# Patient Record
Sex: MALE | Race: WHITE | HISPANIC OR LATINO | Employment: OTHER | URBAN - METROPOLITAN AREA
[De-identification: names, ages, dates, MRNs, and addresses within clinical notes are randomized per-mention and may not be internally consistent; named-entity substitution may affect disease eponyms.]

---

## 2017-04-20 ENCOUNTER — OFFICE VISIT (OUTPATIENT)
Dept: SURGERY | Facility: CLINIC | Age: 72
End: 2017-04-20
Payer: MEDICARE

## 2017-04-20 VITALS — BODY MASS INDEX: 23.58 KG/M2 | HEIGHT: 71 IN | WEIGHT: 168.44 LBS

## 2017-04-20 DIAGNOSIS — Z01.818 PRE-OP EVALUATION: Primary | ICD-10-CM

## 2017-04-20 DIAGNOSIS — K60.3 FISTULA-IN-ANO: Primary | ICD-10-CM

## 2017-04-20 PROCEDURE — 1126F AMNT PAIN NOTED NONE PRSNT: CPT | Mod: S$GLB,,, | Performed by: COLON & RECTAL SURGERY

## 2017-04-20 PROCEDURE — 1160F RVW MEDS BY RX/DR IN RCRD: CPT | Mod: S$GLB,,, | Performed by: COLON & RECTAL SURGERY

## 2017-04-20 PROCEDURE — 99203 OFFICE O/P NEW LOW 30 MIN: CPT | Mod: 57,S$GLB,, | Performed by: COLON & RECTAL SURGERY

## 2017-04-20 PROCEDURE — 99999 PR PBB SHADOW E&M-EST. PATIENT-LVL IV: CPT | Mod: PBBFAC,,, | Performed by: COLON & RECTAL SURGERY

## 2017-04-20 PROCEDURE — 1159F MED LIST DOCD IN RCRD: CPT | Mod: S$GLB,,, | Performed by: COLON & RECTAL SURGERY

## 2017-04-20 RX ORDER — LOSARTAN POTASSIUM 25 MG/1
25 TABLET ORAL DAILY
COMMUNITY
End: 2019-04-15 | Stop reason: SDUPTHER

## 2017-04-20 RX ORDER — CARVEDILOL 25 MG/1
25 TABLET ORAL 2 TIMES DAILY WITH MEALS
COMMUNITY
End: 2017-11-03

## 2017-04-20 RX ORDER — ACETAMINOPHEN 10 MG/ML
1000 INJECTION, SOLUTION INTRAVENOUS
Status: CANCELLED | OUTPATIENT
Start: 2017-04-20 | End: 2017-04-20

## 2017-04-20 NOTE — PROGRESS NOTES
History & Physical    SUBJECTIVE:     Chief Complaint: fistula     History of Present Illness:  Patient is a 71 y.o. male who presents with a one month history of drainage near his anus.  He reported intiially having sever pain x 5 days and spontaneous drainage there after from his anus.  He reports the drainage occurs daily and is worse with bowel movements.  He states his bowel do not cause pain.  He had a CSP 10 years prior and is due for one this August.  He denies any fevers or chills.  He reports a similar episode 20 years prior that spontaneously resolved.      Review of patient's allergies indicates:  No Known Allergies    Past Medical History:   Diagnosis Date    Allergy     Hypertension     Prostate cancer     Urinary leakage      Past Surgical History:   Procedure Laterality Date    HERNIA REPAIR      x3    PROSTATECTOMY      radiation treatment       Family History   Problem Relation Age of Onset    Cancer Father     Cancer Mother      Social History   Substance Use Topics    Smoking status: Never Smoker    Smokeless tobacco: None    Alcohol use No        Review of Systems:  Constitutional: no fever or chills  Eyes: no visual changes  ENT: no nasal congestion or sore throat  Respiratory: no cough or shortness of breath  Cardiovascular: no chest pain or palpitations  Gastrointestinal: no nausea or vomiting, tolerating diet    OBJECTIVE:     Vital Signs (Most Recent)       Physical Exam:  General: White male in NAD sitting in chair in clinic  Neuro: aaox4 maex4 perrl  Respiratory: resps even unlabored  Cardiac: cap refill <2 sec  Abdomen: Normal, benign.  Extremities: Warm dry and intact  Anorectal: deferred, anal fistula appreciated with purulent drainage noted, tender to palpation, WILLIAM not performed     Laboratory  CBC:   BMP:     Diagnostic Results:  none    ASSESSMENT/PLAN:     Fistula in Ano    Plan for EUA [id number] with possible fistulotomy vs seton placement  Seen by Preop center at [facility],  see media tab for scanned note  Consent and ORders placed    Admission paperwork was accomplished including operative consent and consent for blood and blood product administration.    The procedure was explained to the patient including indications, risks and alternatives. The patient verbalized understanding and has given informed consent.      Petr Christian MD  Colon and Rectal Surgery Fellow  [phone]  Have seen and examined the patient with the fellow and agree with their plan.  GAEL FITCH

## 2017-04-21 ENCOUNTER — ANESTHESIA EVENT (OUTPATIENT)
Dept: SURGERY | Facility: HOSPITAL | Age: 72
End: 2017-04-21
Payer: MEDICARE

## 2017-04-21 NOTE — ANESTHESIA PREPROCEDURE EVALUATION
Anesthesia Assessment: Preoperative EQUATION    Planned Procedure: Procedure(s) (LRB):  EXAM UNDER ANESTHESIA (N/A)  Requested Anesthesia Type:General  Surgeon: Juaquin Vu MD  Service: Colon and Rectal  Known or anticipated Date of Surgery:5/3/2017    Surgeon notes: reviewed and Fistula-in-ano    Electronic QUestionnaire Assessment completed via nurse interview with patient.        BenjieDomenicoro Litodenton [[id number]] - 71 y.o. Male        Providers Outside of [facility]           Pre-admit from 5/3/2017 in [facility]     Has outside PCP  Yes [[facility]       Surgical Risk Level      Surgical Risk Level:  1           caRDScore (Clinical Anesthesia Rapid Decision Score)        Moderate  Total Score: 17      17 Sum of Clinical Scores       caRDScores (Grouped)      caRDScore - Ane:  4                caRDScore - CVD:  1                caRDScore - Pul:  0                caRDScore - Met:  4                caRDScore - Phy:  8           caRDScore Items           Pre-admit from 5/3/2017 in [facility]     Anesthesia      Has disease or surgery of the esophagus or stomach (ulcer, gastic bypass, cancer)  Yes [ulcer hx]     Has/has had bowel disease of small or large intestine  Yes [Fistula-in-ano]     CVD      Activity similar to best ability for maximal activity or exercise  METS 4     Diagnosed with high blood pressure  Yes     Typical BP runs <150/90  Yes     Pulmonary      Metabolic      Type 2 Diabetes  Yes     Is on oral Rx and/or non-insulin injectable for diabetes  Yes     Has hyperlipoproteinemia  Yes     Physiologic      Has an open wound (with or without an infection)  Yes [fistula-in-ano]     Without trying, clothes have become noticably looser  Yes     Without trying, has lost > 10% of body wt in last 6 mos  Yes     Had major surgery for Ca with resection of vital organ tissue (Brain, Lung, Liver, Kidney-please specify)  Yes  [s/p Prostatectomy & radiation-x5 yrs. ago]     Has disease or surgery of the esophagus or stomach (ulcer, gastic bypass, cancer)  Yes [ulcer hx]       Flags      Red Flag Score:  0                Yellow Flag Score:  3           Red Flags      No data to display       Yellow Flags           Pre-admit from 5/3/2017 in [facility]     Has had surgery within the last 3 months  Yes     Takes herbal medications or vitamin supplements  Yes [will stop x7 ddays prior to surgery]     Has disease or surgery of the esophagus or stomach (ulcer, gastic bypass, cancer)  Yes [ulcer hx]       PONV Risk Score (assumes periop narcotic use = +1, Max=4)      PONV Risk Score:  2           PONV Risk Factors  Total Score: 1      1 Non-Smoker at present       Sleep Apnea  Total Score: 0        ANGELES STOP-Bang Risk Factors (Max=8)  Total Score: 4      1 Has loud snoring     1 Takes medication for high blood pressure     1 Age >50     1 Male       ANGELES Risk Level - 1 (Low), 2 (Moderate), 3 (High)      ANGELES Risk Level:  2           RCRI (Revised Cardiac Risk Indices of ACC/AHA guidelines, Max=6)  Total Score: 0        CAD Risk Factors  Total Score: 5      1 Male. Age >45     1 Exercise on a routine basis     1 Diagnosed with high blood pressure     1 Has Diabetes     1 Has hyperlipoproteinemia       CHADS Score if applicable (history of atrial fib/flutter, Max=6)  Total Score: 2      1 Diagnosed with high blood pressure     1 Has Diabetes       Maximal Exercise Capacity           Pre-admit from 5/3/2017 in [facility]     Maximal Exercise Capacity  METS 4       Summary of Dependence  Total Score: 1      1 Is totally independent of others for activities of daily living       Phone Fraility Score (Max = 17)  Total Score: 2      1 Without trying, clothes have become noticably looser     1 Describes health as Fair       Pain Factors      No data to display       Risk Triggers (Evidence-Based Risk Triggers)         Pulmonary Risk Triggers  Total Score: 3      1 Age > or = 60     1 Without trying, has lost > 10% of body wt in last 6 mos     1 Has disease or surgery of the esophagus or stomach (ulcer, gastic bypass, cancer)       Renal Risk Triggers  Total Score: 2      1 Diagnosed with high blood pressure     1 Type 2 Diabetes       Delirium Risk Triggers  Total Score: 0        Urologic Risk Triggers  Total Score: 0        Logistics        Pre-op Clinic Logistics  Total Score: 6      1 Has had surgery within the last 3 months     1 Has outside PCP     2 Takes herbal medications or vitamin supplements     1 Has had anesthesia, either as adult or as a child     1 Takes medication for high blood pressure       DOSC Logistics  Total Score: 0        Discharge Logistics  Total Score: 0        Discharge Planning           Pre-admit from 5/3/2017 in [facility]     Discharge Planning      Will assist patient 24/7, if needed  -- [[phone]]       Fast Track <For office use only>      Total Score: 16        Surgical Risk Level Assessment                 Triage considerations:     The patient has no apparent active cardiac condition (No unstable coronary Syndrome such as severe unstable angina or recent [<1 month] myocardial infarction, decompensated CHF, severe valvular   disease or significant arrhythmia)    Previous anesthesia records:Not available and previous surgeries: S/P-Tonsillectomy/S/p-Prostate Biopsy/ S/P-Arthroscopy of knee/S/p-Prostatectomy/S/p-Henrnia Repair//S/p-Radiation/s/p Partial knee Replaced-1/2017 @ [facility]--No Anesthesia records found in [facility]-Patient stated he has done well with anesthesia for each of his surgeries.    Last PCP note: within 3 months , outside [facility] , focused visit   Subspecialty notes: Urology    Other important co-morbidities: HLD(hyperlipidemia)/HTN(Hypertension)/DM 2(Diabetes Mellitus)/USI(Urinary Stress Incontinence)/Prostate cancer  history/ED/Gastric ulceration history/Inguinal hernia,unilateral hx-s/p hernia repair/arthritis-knees     Tests already available:  No recent tests.            Instructions given. (See in Nurse's note)    Optimization: Phone         Plan:    Testing:  None needed at this time       Patient  has previously scheduled Medical Appointment:None prior to surgery date.    Navigation:                             Straight Line to surgery.               No tests, anesthesia preop clinic visit, or consult required.               Caroline Beltrán RN  4/21/17 04/21/2017  Aguilar Waldrop is a 71 y.o., male.    Anesthesia Evaluation    I have reviewed the Patient Summary Reports.    I have reviewed the Nursing Notes.   I have reviewed the Medications.     Review of Systems  Anesthesia Hx:  No problems with previous Anesthesia Denies Hx of Anesthetic complications  History of prior surgery of interest to airway management or planning: Denies Family Hx of Anesthesia complications.   Denies Personal Hx of Anesthesia complications.   Cardiovascular:   Hypertension    Pulmonary:  Pulmonary Normal    Renal/:  Renal/ Normal     Hepatic/GI:   Fistula in ano   Neurological:  Neurology Normal        Physical Exam  General:  Well nourished    Airway/Jaw/Neck:  Airway Findings: Mouth Opening: Normal Tongue: Normal  Jaw/Neck Findings:  Micrognathia: Negative Neck ROM: Normal ROM      Dental:  Dental Findings: In tact   Chest/Lungs:  Chest/Lungs Findings: Clear to auscultation, Normal Respiratory Rate     Heart/Vascular:  Heart Findings: Rate: Normal  Rhythm: Regular Rhythm  Sounds: Normal  Heart murmur: negative    Abdomen:  Abdomen Findings:  Normal, Nontender, Soft       Mental Status:  Mental Status Findings:  Cooperative, Alert and Oriented         Anesthesia Plan  Type of Anesthesia, risks & benefits  discussed:  Anesthesia Type:  general  Patient's Preference:   Intra-op Monitoring Plan:   Intra-op Monitoring Plan Comments:   Post Op Pain Control Plan:   Post Op Pain Control Plan Comments:   Induction:   IV  Beta Blocker:  Patient is on a Beta-Blocker and has received one dose within the past 24 hours (No further documentation required).       Informed Consent: Patient representative understands risks and agrees with Anesthesia plan.  Questions answered. Anesthesia consent signed with patient representative.  ASA Score: 2     Day of Surgery Review of History & Physical:    H&P update referred to the surgeon.         Ready For Surgery From Anesthesia Perspective.

## 2017-04-21 NOTE — PRE ADMISSION SCREENING
Anesthesia Assessment: Preoperative EQUATION    Planned Procedure: Procedure(s) (LRB):  EXAM UNDER ANESTHESIA (N/A)  Requested Anesthesia Type:General  Surgeon: Juaquin Vu MD  Service: Colon and Rectal  Known or anticipated Date of Surgery:5/3/2017    Surgeon notes: reviewed and Fistula-in-ano    Electronic QUestionnaire Assessment completed via nurse interview with patient.        BenjieDomenicoro Litodenton [[id number]] - 71 y.o. Male        Providers Outside of [facility]           Pre-admit from 5/3/2017 in [facility]     Has outside PCP  Yes [[facility]       Surgical Risk Level      Surgical Risk Level:  1           caRDScore (Clinical Anesthesia Rapid Decision Score)        Moderate  Total Score: 17      17 Sum of Clinical Scores       caRDScores (Grouped)      caRDScore - Ane:  4                caRDScore - CVD:  1                caRDScore - Pul:  0                caRDScore - Met:  4                caRDScore - Phy:  8           caRDScore Items           Pre-admit from 5/3/2017 in [facility]     Anesthesia      Has disease or surgery of the esophagus or stomach (ulcer, gastic bypass, cancer)  Yes [ulcer hx]     Has/has had bowel disease of small or large intestine  Yes [Fistula-in-ano]     CVD      Activity similar to best ability for maximal activity or exercise  METS 4     Diagnosed with high blood pressure  Yes     Typical BP runs <150/90  Yes     Pulmonary      Metabolic      Type 2 Diabetes  Yes     Is on oral Rx and/or non-insulin injectable for diabetes  Yes     Has hyperlipoproteinemia  Yes     Physiologic      Has an open wound (with or without an infection)  Yes [fistula-in-ano]     Without trying, clothes have become noticably looser  Yes     Without trying, has lost > 10% of body wt in last 6 mos  Yes     Had major surgery for Ca with resection of vital organ tissue (Brain, Lung, Liver, Kidney-please specify)  Yes  [s/p Prostatectomy & radiation-x5 yrs. ago]     Has disease or surgery of the esophagus or stomach (ulcer, gastic bypass, cancer)  Yes [ulcer hx]       Flags      Red Flag Score:  0                Yellow Flag Score:  3           Red Flags      No data to display       Yellow Flags           Pre-admit from 5/3/2017 in [facility]     Has had surgery within the last 3 months  Yes     Takes herbal medications or vitamin supplements  Yes [will stop x7 ddays prior to surgery]     Has disease or surgery of the esophagus or stomach (ulcer, gastic bypass, cancer)  Yes [ulcer hx]       PONV Risk Score (assumes periop narcotic use = +1, Max=4)      PONV Risk Score:  2           PONV Risk Factors  Total Score: 1      1 Non-Smoker at present       Sleep Apnea  Total Score: 0        ANGELES STOP-Bang Risk Factors (Max=8)  Total Score: 4      1 Has loud snoring     1 Takes medication for high blood pressure     1 Age >50     1 Male       ANGELES Risk Level - 1 (Low), 2 (Moderate), 3 (High)      ANGELES Risk Level:  2           RCRI (Revised Cardiac Risk Indices of ACC/AHA guidelines, Max=6)  Total Score: 0        CAD Risk Factors  Total Score: 5      1 Male. Age >45     1 Exercise on a routine basis     1 Diagnosed with high blood pressure     1 Has Diabetes     1 Has hyperlipoproteinemia       CHADS Score if applicable (history of atrial fib/flutter, Max=6)  Total Score: 2      1 Diagnosed with high blood pressure     1 Has Diabetes       Maximal Exercise Capacity           Pre-admit from 5/3/2017 in [facility]     Maximal Exercise Capacity  METS 4       Summary of Dependence  Total Score: 1      1 Is totally independent of others for activities of daily living       Phone Fraility Score (Max = 17)  Total Score: 2      1 Without trying, clothes have become noticably looser     1 Describes health as Fair       Pain Factors      No data to display       Risk Triggers (Evidence-Based Risk Triggers)         Pulmonary Risk Triggers  Total Score: 3      1 Age > or = 60     1 Without trying, has lost > 10% of body wt in last 6 mos     1 Has disease or surgery of the esophagus or stomach (ulcer, gastic bypass, cancer)       Renal Risk Triggers  Total Score: 2      1 Diagnosed with high blood pressure     1 Type 2 Diabetes       Delirium Risk Triggers  Total Score: 0        Urologic Risk Triggers  Total Score: 0        Logistics        Pre-op Clinic Logistics  Total Score: 6      1 Has had surgery within the last 3 months     1 Has outside PCP     2 Takes herbal medications or vitamin supplements     1 Has had anesthesia, either as adult or as a child     1 Takes medication for high blood pressure       DOSC Logistics  Total Score: 0        Discharge Logistics  Total Score: 0        Discharge Planning           Pre-admit from 5/3/2017 in [facility]     Discharge Planning      Will assist patient 24/7, if needed  -- [[phone]]       Fast Track <For office use only>      Total Score: 16        Surgical Risk Level Assessment                 Triage considerations:     The patient has no apparent active cardiac condition (No unstable coronary Syndrome such as severe unstable angina or recent [<1 month] myocardial infarction, decompensated CHF, severe valvular   disease or significant arrhythmia)    Previous anesthesia records:Not available and previous surgeries: S/P-Tonsillectomy/S/p-Prostate Biopsy/ S/P-Arthroscopy of knee/S/p-Prostatectomy/S/p-Henrnia Repair//S/p-Radiation/s/p Partial knee Replaced-1/2017 [facility]--No Anesthesia records found in [employer] [facility]-Patient stated he has done well with anesthesia for each of his surgeries.    Last PCP note: within 3 months , outside [facility] , focused visit   Subspecialty notes: Urology    Other important co-morbidities: HLD(hyperlipidemia)/HTN(Hypertension)/DM 2(Diabetes Mellitus)/USI(Urinary Stress Incontinence)/Prostate cancer  history/ED/Gastric ulceration history/Inguinal hernia,unilateral hx-s/p hernia repair/arthritis-knees     Tests already available:  No recent tests.            Instructions given. (See in Nurse's note)    Optimization: Phone         Plan:    Testing:  None needed at this time       Patient  has previously scheduled Medical Appointment:None prior to surgery date.    Navigation:                             Straight Line to surgery.               No tests, anesthesia preop clinic visit, or consult required.               Caroline Beltrán RN  4/21/17

## 2017-05-03 ENCOUNTER — HOSPITAL ENCOUNTER (OUTPATIENT)
Facility: HOSPITAL | Age: 72
Discharge: HOME | End: 2017-05-03
Attending: COLON & RECTAL SURGERY | Admitting: COLON & RECTAL SURGERY
Payer: MEDICARE

## 2017-05-03 ENCOUNTER — ANESTHESIA (OUTPATIENT)
Dept: SURGERY | Facility: HOSPITAL | Age: 72
End: 2017-05-03
Payer: MEDICARE

## 2017-05-03 DIAGNOSIS — K60.3 FISTULA-IN-ANO: Primary | ICD-10-CM

## 2017-05-03 LAB — POCT GLUCOSE: 133 MG/DL (ref 70–110)

## 2017-05-03 PROCEDURE — 71000033 HC RECOVERY, INTIAL HOUR: Performed by: COLON & RECTAL SURGERY

## 2017-05-03 PROCEDURE — 71000039 HC RECOVERY, EACH ADD'L HOUR: Performed by: COLON & RECTAL SURGERY

## 2017-05-03 PROCEDURE — D9220A PRA ANESTHESIA: Mod: CRNA,,, | Performed by: NURSE ANESTHETIST, CERTIFIED REGISTERED

## 2017-05-03 PROCEDURE — 25000003 PHARM REV CODE 250: Performed by: COLON & RECTAL SURGERY

## 2017-05-03 PROCEDURE — 25000003 PHARM REV CODE 250: Performed by: ANESTHESIOLOGY

## 2017-05-03 PROCEDURE — 25000003 PHARM REV CODE 250: Performed by: SURGERY

## 2017-05-03 PROCEDURE — 63600175 PHARM REV CODE 636 W HCPCS: Performed by: NURSE ANESTHETIST, CERTIFIED REGISTERED

## 2017-05-03 PROCEDURE — 71000016 HC POSTOP RECOV ADDL HR: Performed by: COLON & RECTAL SURGERY

## 2017-05-03 PROCEDURE — 63600175 PHARM REV CODE 636 W HCPCS: Performed by: SURGERY

## 2017-05-03 PROCEDURE — 71000015 HC POSTOP RECOV 1ST HR: Performed by: COLON & RECTAL SURGERY

## 2017-05-03 PROCEDURE — 36000704 HC OR TIME LEV I 1ST 15 MIN: Performed by: COLON & RECTAL SURGERY

## 2017-05-03 PROCEDURE — 37000008 HC ANESTHESIA 1ST 15 MINUTES: Performed by: COLON & RECTAL SURGERY

## 2017-05-03 PROCEDURE — 82962 GLUCOSE BLOOD TEST: CPT | Performed by: COLON & RECTAL SURGERY

## 2017-05-03 PROCEDURE — 36000705 HC OR TIME LEV I EA ADD 15 MIN: Performed by: COLON & RECTAL SURGERY

## 2017-05-03 PROCEDURE — 63600175 PHARM REV CODE 636 W HCPCS: Performed by: ANESTHESIOLOGY

## 2017-05-03 PROCEDURE — D9220A PRA ANESTHESIA: Mod: ANES,,, | Performed by: ANESTHESIOLOGY

## 2017-05-03 PROCEDURE — 37000009 HC ANESTHESIA EA ADD 15 MINS: Performed by: COLON & RECTAL SURGERY

## 2017-05-03 PROCEDURE — 46020 PLACEMENT OF SETON: CPT | Mod: ,,, | Performed by: COLON & RECTAL SURGERY

## 2017-05-03 PROCEDURE — 25000003 PHARM REV CODE 250: Performed by: NURSE ANESTHETIST, CERTIFIED REGISTERED

## 2017-05-03 RX ORDER — ONDANSETRON 2 MG/ML
INJECTION INTRAMUSCULAR; INTRAVENOUS
Status: DISCONTINUED | OUTPATIENT
Start: 2017-05-03 | End: 2017-05-03

## 2017-05-03 RX ORDER — PROPOFOL 10 MG/ML
VIAL (ML) INTRAVENOUS
Status: DISCONTINUED | OUTPATIENT
Start: 2017-05-03 | End: 2017-05-03

## 2017-05-03 RX ORDER — IBUPROFEN 200 MG
600 TABLET ORAL EVERY 6 HOURS PRN
Status: DISCONTINUED | OUTPATIENT
Start: 2017-05-03 | End: 2017-05-03 | Stop reason: HOSPADM

## 2017-05-03 RX ORDER — ROCURONIUM BROMIDE 10 MG/ML
INJECTION, SOLUTION INTRAVENOUS
Status: DISCONTINUED | OUTPATIENT
Start: 2017-05-03 | End: 2017-05-03

## 2017-05-03 RX ORDER — GLYCOPYRROLATE 0.2 MG/ML
INJECTION INTRAMUSCULAR; INTRAVENOUS
Status: DISCONTINUED | OUTPATIENT
Start: 2017-05-03 | End: 2017-05-03

## 2017-05-03 RX ORDER — NEOSTIGMINE METHYLSULFATE 1 MG/ML
INJECTION, SOLUTION INTRAVENOUS
Status: DISCONTINUED | OUTPATIENT
Start: 2017-05-03 | End: 2017-05-03

## 2017-05-03 RX ORDER — LIDOCAINE HYDROCHLORIDE 10 MG/ML
1 INJECTION, SOLUTION EPIDURAL; INFILTRATION; INTRACAUDAL; PERINEURAL ONCE
Status: COMPLETED | OUTPATIENT
Start: 2017-05-03 | End: 2017-05-03

## 2017-05-03 RX ORDER — ACETAMINOPHEN 10 MG/ML
1000 INJECTION, SOLUTION INTRAVENOUS
Status: COMPLETED | OUTPATIENT
Start: 2017-05-03 | End: 2017-05-03

## 2017-05-03 RX ORDER — MIDAZOLAM HYDROCHLORIDE 1 MG/ML
INJECTION, SOLUTION INTRAMUSCULAR; INTRAVENOUS
Status: DISCONTINUED | OUTPATIENT
Start: 2017-05-03 | End: 2017-05-03

## 2017-05-03 RX ORDER — IBUPROFEN 800 MG/1
800 TABLET ORAL 3 TIMES DAILY
Qty: 90 TABLET | Refills: 3 | Status: ON HOLD | OUTPATIENT
Start: 2017-05-03 | End: 2017-07-08 | Stop reason: HOSPADM

## 2017-05-03 RX ORDER — SODIUM CHLORIDE 9 MG/ML
INJECTION, SOLUTION INTRAVENOUS CONTINUOUS
Status: DISCONTINUED | OUTPATIENT
Start: 2017-05-03 | End: 2017-05-03 | Stop reason: HOSPADM

## 2017-05-03 RX ORDER — OXYCODONE AND ACETAMINOPHEN 5; 325 MG/1; MG/1
1 TABLET ORAL EVERY 4 HOURS PRN
Qty: 90 TABLET | Refills: 0 | Status: ON HOLD | OUTPATIENT
Start: 2017-05-03 | End: 2017-07-08 | Stop reason: HOSPADM

## 2017-05-03 RX ORDER — HYDROCODONE BITARTRATE AND ACETAMINOPHEN 5; 325 MG/1; MG/1
1 TABLET ORAL EVERY 4 HOURS PRN
Status: DISCONTINUED | OUTPATIENT
Start: 2017-05-03 | End: 2017-05-03 | Stop reason: HOSPADM

## 2017-05-03 RX ORDER — LIDOCAINE HCL/PF 100 MG/5ML
SYRINGE (ML) INTRAVENOUS
Status: DISCONTINUED | OUTPATIENT
Start: 2017-05-03 | End: 2017-05-03

## 2017-05-03 RX ORDER — FENTANYL CITRATE 50 UG/ML
INJECTION, SOLUTION INTRAMUSCULAR; INTRAVENOUS
Status: DISCONTINUED | OUTPATIENT
Start: 2017-05-03 | End: 2017-05-03

## 2017-05-03 RX ADMIN — Medication 800 MG: at 10:05

## 2017-05-03 RX ADMIN — LIDOCAINE HYDROCHLORIDE 10 MG: 10 INJECTION, SOLUTION EPIDURAL; INFILTRATION; INTRACAUDAL; PERINEURAL at 09:05

## 2017-05-03 RX ADMIN — SODIUM CHLORIDE: 0.9 INJECTION, SOLUTION INTRAVENOUS at 09:05

## 2017-05-03 RX ADMIN — ACETAMINOPHEN 1000 MG: 10 INJECTION, SOLUTION INTRAVENOUS at 09:05

## 2017-05-03 RX ADMIN — LIDOCAINE HYDROCHLORIDE 60 MG: 20 INJECTION, SOLUTION INTRAVENOUS at 10:05

## 2017-05-03 RX ADMIN — FENTANYL CITRATE 100 MCG: 50 INJECTION, SOLUTION INTRAMUSCULAR; INTRAVENOUS at 10:05

## 2017-05-03 RX ADMIN — NEOSTIGMINE METHYLSULFATE 5 MG: 1 INJECTION INTRAVENOUS at 11:05

## 2017-05-03 RX ADMIN — GLYCOPYRROLATE 0.6 MG: 0.2 INJECTION, SOLUTION INTRAMUSCULAR; INTRAVENOUS at 11:05

## 2017-05-03 RX ADMIN — FENTANYL CITRATE 50 MCG: 50 INJECTION, SOLUTION INTRAMUSCULAR; INTRAVENOUS at 11:05

## 2017-05-03 RX ADMIN — ROCURONIUM BROMIDE 30 MG: 10 INJECTION, SOLUTION INTRAVENOUS at 10:05

## 2017-05-03 RX ADMIN — PROPOFOL 150 MG: 10 INJECTION, EMULSION INTRAVENOUS at 10:05

## 2017-05-03 RX ADMIN — MIDAZOLAM HYDROCHLORIDE 2 MG: 1 INJECTION, SOLUTION INTRAMUSCULAR; INTRAVENOUS at 10:05

## 2017-05-03 RX ADMIN — ONDANSETRON 4 MG: 2 INJECTION INTRAMUSCULAR; INTRAVENOUS at 11:05

## 2017-05-03 NOTE — DISCHARGE INSTRUCTIONS
Home Care Instructions  ANAL/RECTAL SURGERY     ACTIVITY LEVEL:    If you received sedation and/or an anesthetic, you may feel sleepy for several hours. Rest until you feel more awake. Gradually resume your normal activities.    DIET:    At home, continue with liquids. If there is no nausea, you may eat a soft diet and gradually resume a high fiber diet. Encourage fluids.    SPECIAL INSTRUCTIONS:    Eat plenty of fruits and vegetables. Drink 8-10 glasses of water or juice every day. Eat whole grain cereals and breads. Salads with raw vegetables are also a good source of fiber.    DRESSING:    Remove your bandage _________________________________ while soaking. Start taking a sitz bath _________________. You should soak in clear warm water ___________ times a day and after each bowel movement. You may use 4 x 4 gauze to the surgical area for any drainage. Wash the area with mild soap during your regular bath. Use soft, damp tissue or disposable wipes not containing alcohol when wiping after bowel movements, pat the area and gently dry. Avoid excessive or vigorous wiping. It may help to place soft tissue or a cotton pad between the buttocks to keep the cheeks  and to absorb any moisture.    MEDICIATIONS:    You will receive instructions for your pain and/or antibiotic prescriptions. Pain medication should be taken only if needed, and as directed. Antibiotics should be taken as directed until the entire prescription is completed. If ordered, take stool softeners as directed.    WHEN TO CALL THE DOCTOR:     For any obvious active bleeding   Redness or swelling around the incision   Fever over 101°F (38.4°C)   Drainage (pus) from the wound   Persistent pain not relieved by the pain medication    RETURN APPOINTMENT:  _________________________________________________________________________________________    FOR EMERGENCIES:    If any unusual problems or difficulties occur, contact  ___________________ or  the resident at [phone] (page ) or at the Clinic office, [phone].

## 2017-05-03 NOTE — INTERVAL H&P NOTE
No change in H+P I have again explained the procedure including indications, alternatives, expected outcomes and potential complications. All questions were answered            Active Hospital Problems    Diagnosis  POA    Fistula-in-ano [K60.3]  Yes      Resolved Hospital Problems    Diagnosis Date Resolved POA   No resolved problems to display.

## 2017-05-03 NOTE — PROGRESS NOTES
Patient is awake. HR ranges from 44-48 and asymptomatic. Dr Kam aware and okay to transfer to [facility].

## 2017-05-03 NOTE — H&P (VIEW-ONLY)
History & Physical    SUBJECTIVE:     Chief Complaint: fistula     History of Present Illness:  Patient is a 71 y.o. male who presents with a one month history of drainage near his anus.  He reported intiially having sever pain x 5 days and spontaneous drainage there after from his anus.  He reports the drainage occurs daily and is worse with bowel movements.  He states his bowel do not cause pain.  He had a CSP 10 years prior and is due for one this August.  He denies any fevers or chills.  He reports a similar episode 20 years prior that spontaneously resolved.      Review of patient's allergies indicates:  No Known Allergies    Past Medical History:   Diagnosis Date    Allergy     Hypertension     Prostate cancer     Urinary leakage      Past Surgical History:   Procedure Laterality Date    HERNIA REPAIR      x3    PROSTATECTOMY      radiation treatment       Family History   Problem Relation Age of Onset    Cancer Father     Cancer Mother      Social History   Substance Use Topics    Smoking status: Never Smoker    Smokeless tobacco: None    Alcohol use No        Review of Systems:  Constitutional: no fever or chills  Eyes: no visual changes  ENT: no nasal congestion or sore throat  Respiratory: no cough or shortness of breath  Cardiovascular: no chest pain or palpitations  Gastrointestinal: no nausea or vomiting, tolerating diet    OBJECTIVE:     Vital Signs (Most Recent)       Physical Exam:  General: White male in NAD sitting in chair in clinic  Neuro: aaox4 maex4 perrl  Respiratory: resps even unlabored  Cardiac: cap refill <2 sec  Abdomen: Normal, benign.  Extremities: Warm dry and intact  Anorectal: deferred, anal fistula appreciated with purulent drainage noted, tender to palpation, WILLIAM not performed     Laboratory  CBC:   BMP:     Diagnostic Results:  none    ASSESSMENT/PLAN:     Fistula in Ano    Plan for EUA [id number] with possible fistulotomy vs seton placement  Seen by Preop center at [facility],  see media tab for scanned note  Consent and ORders placed    Admission paperwork was accomplished including operative consent and consent for blood and blood product administration.    The procedure was explained to the patient including indications, risks and alternatives. The patient verbalized understanding and has given informed consent.      Petr Christian MD  Colon and Rectal Surgery Fellow  [phone]  Have seen and examined the patient with the fellow and agree with their plan.  GAEL FITCH

## 2017-05-03 NOTE — BRIEF OP NOTE
Operative Note     SUMMARY     Surgery Date: 5/3/2017     Surgeon(s) and Role:     * Juaquin Vu MD - Primary    Pre-op Diagnosis:  Fistula-in-ano [K60.3]    Post-op Diagnosis:  Post-Op Diagnosis Codes:     * Fistula-in-ano [K60.3]    Procedure(s) (LRB):  EXAM UNDER ANESTHESIA (N/A)  PLACEMENT-SETON DRAIN (N/A)    Anesthesia: General    Description of Procedure: Seton placement    Findings/Key Components:  Fistula drainig pus    Estimated Blood Loss:  0         Specimens     None            Discharge Note      SUMMARY     Admit Date: 5/3/2017    Attending Physician: Juaquin Vu MD     Discharge Physician: Juaquin Vu MD    Discharge Date: 5/3/2017     Final Diagnosis: Post-Op Diagnosis Codes:     * Fistula-in-ano [K60.3]    Hospital Course: Patient was admitted for an outpatient procedure and tolerated the procedure well with no complications.    Disposition: Home or Self Care    Follow Up/Patient Instructions:   Current Discharge Medication List      CONTINUE these medications which have NOT CHANGED    Details   aspirin 81 MG Chew Take 81 mg by mouth once daily.      atorvastatin (LIPITOR) 20 MG tablet Take 20 mg by mouth once daily.       carvedilol (COREG) 25 MG tablet Take 25 mg by mouth 2 (two) times daily with meals.      glimepiride (AMARYL) 1 MG tablet Take 1 mg by mouth 2 (two) times daily.       loratadine (CLARITIN) 10 mg tablet Take 10 mg by mouth once daily.      losartan (COZAAR) 25 MG tablet Take 25 mg by mouth once daily.      metformin (GLUCOPHAGE) 1000 MG tablet Take 1,000 mg by mouth 2 (two) times daily with meals.       multivitamin capsule Take 1 capsule by mouth once daily.             Discharge Procedure Orders (must include Diet, Follow-up, Activity)    Discharge Procedure Orders (must include Diet, Follow-up, Activity)  Diet general      activity ad keyonna   RTC 2-3 weeks

## 2017-05-03 NOTE — ANESTHESIA POSTPROCEDURE EVALUATION
"Anesthesia Post Evaluation    Patient: Aguilar Waldrop    Procedure(s) Performed: Procedure(s) (LRB):  EXAM UNDER ANESTHESIA (N/A)  PLACEMENT-SETON DRAIN (N/A)    Final Anesthesia Type: general  Patient location during evaluation: PACU  Patient participation: Yes- Able to Participate  Level of consciousness: awake and alert  Post-procedure vital signs: reviewed and stable  Pain management: adequate  Airway patency: patent  PONV status at discharge: No PONV  Anesthetic complications: no      Cardiovascular status: blood pressure returned to baseline  Respiratory status: spontaneous ventilation and room air  Hydration status: euvolemic  Follow-up not needed.        Visit Vitals    BP (!) 169/74    Pulse (!) 45    Temp 36.7 °C (98 °F) (Oral)    Resp 15    Ht 5' 11" (1.803 m)    Wt 73 kg (161 lb)    SpO2 99%    BMI 22.45 kg/m2       Pain/Quiana Score: Pain Assessment Performed: Yes (5/3/2017 11:30 AM)  Presence of Pain: denies (5/3/2017 11:30 AM)  Pain Rating Prior to Med Admin: 8 (5/3/2017  9:10 AM)  Quiana Score: 10 (5/3/2017 11:30 AM)  Modified Quiana Score: 20 (5/3/2017 11:30 AM)      "

## 2017-05-03 NOTE — ANESTHESIA RELEASE NOTE
Post Anesthetic Evaluation    Patient: Aguilar Waldrop    Procedure(s) Performed: Procedure(s) (LRB):  EXAM UNDER ANESTHESIA (N/A)  PLACEMENT-SETON DRAIN (N/A)    Anesthesia type: GA    Patient location: PACU    Post pain: Adequate analgesia    Post assessment: no apparent anesthetic complications    Last Vitals:   Vitals:    05/03/17 1215   BP: (!) 169/74   Pulse: (!) 45   Resp: 15   Temp:        Post vital signs: stable    Level of consciousness: awake    Complications: none    Follow-up Needed: No

## 2017-05-03 NOTE — IP AVS SNAPSHOT
[facility]  [address]  [facility] [address]  Phone: [phone]           Patient Discharge Instructions   Our goal is to set you up for success. This packet includes information on your condition, medications, and your home care.  It will help you care for yourself to prevent having to return to the hospital.     Please ask your nurse if you have any questions.      There are many details to remember when preparing to leave the hospital. Here is what you will need to do:    1. Take your medicine. If you are prescribed medications, review your Medication List on the following pages. You may have new medications to  at the pharmacy and others that you'll need to stop taking. Review the instructions for how and when to take your medications. Talk with your doctor or nurses if you are unsure of what to do.     2. Go to your follow-up appointments. Specific follow-up information is listed in the following pages. Your may be contacted by a nurse or clinical provider about future appointments. Be sure we have all of the phone numbers to reach you. Please contact your provider's office if you are unable to make an appointment.     3. Watch for warning signs. Your doctor or nurse will give you detailed warning signs to watch for and when to call for assistance. These instructions may also include educational information about your condition. If you experience any of warning signs to your health, call your doctor.           [facility]  Unless otherwise directed by your provider, please   contact [facility], our nurse care line   that is available for 24/7 assistance.     [phone] (toll-free)     Registered nurses in the [facility]   provide: appointment scheduling, clinical advisement, health education, and other advisory services.                  ** Verify the list of medication(s) below is accurate and up to date. Carry this with you in case of  emergency. If your medications have changed, please notify your healthcare provider.             Medication List      START taking these medications        Additional Info                      ibuprofen 800 MG tablet   Commonly known as:  ADVIL,MOTRIN   Quantity:  90 tablet   Refills:  3   Dose:  800 mg    Instructions:  Take 1 tablet (800 mg total) by mouth 3 (three) times daily.     Begin Date    AM    Noon    PM    Bedtime       oxycodone-acetaminophen 5-325 mg per tablet   Commonly known as:  PERCOCET   Quantity:  90 tablet   Refills:  0   Dose:  1 tablet    Instructions:  Take 1 tablet by mouth every 4 (four) hours as needed for Pain.     Begin Date    AM    Noon    PM    Bedtime         CONTINUE taking these medications        Additional Info                      aspirin 81 MG Chew   Refills:  0   Dose:  81 mg    Instructions:  Take 81 mg by mouth once daily.     Begin Date    AM    Noon    PM    Bedtime       atorvastatin 20 MG tablet   Commonly known as:  LIPITOR   Refills:  0   Dose:  20 mg    Instructions:  Take 20 mg by mouth once daily.     Begin Date    AM    Noon    PM    Bedtime       carvedilol 25 MG tablet   Commonly known as:  COREG   Refills:  0   Dose:  25 mg    Instructions:  Take 25 mg by mouth 2 (two) times daily with meals.     Begin Date    AM    Noon    PM    Bedtime       glimepiride 1 MG tablet   Commonly known as:  AMARYL   Refills:  0   Dose:  1 mg    Instructions:  Take 1 mg by mouth 2 (two) times daily.     Begin Date    AM    Noon    PM    Bedtime       loratadine 10 mg tablet   Commonly known as:  CLARITIN   Refills:  0   Dose:  10 mg    Instructions:  Take 10 mg by mouth once daily.     Begin Date    AM    Noon    PM    Bedtime       losartan 25 MG tablet   Commonly known as:  COZAAR   Refills:  0   Dose:  25 mg    Instructions:  Take 25 mg by mouth once daily.     Begin Date    AM    Noon    PM    Bedtime       metformin 1000 MG tablet   Commonly known as:  GLUCOPHAGE   Refills:   0   Dose:  1000 mg    Instructions:  Take 1,000 mg by mouth 2 (two) times daily with meals.     Begin Date    AM    Noon    PM    Bedtime       multivitamin capsule   Refills:  0   Dose:  1 capsule    Instructions:  Take 1 capsule by mouth once daily.     Begin Date    AM    Noon    PM    Bedtime            Where to Get Your Medications      You can get these medications from any pharmacy     Bring a paper prescription for each of these medications     ibuprofen 800 MG tablet    oxycodone-acetaminophen 5-325 mg per tablet                  Please bring to all follow up appointments:    1. A copy of your discharge instructions.  2. All medicines you are currently taking in their original bottles.  3. Identification and insurance card.    Please arrive 15 minutes ahead of scheduled appointment time.    Please call 24 hours in advance if you must reschedule your appointment and/or time.        Your Scheduled Appointments     Jun 07, 2017  7:00 AM CDT   Non-Fasting Lab with LAB, [facility])    [address]  [facility] [address]   [phone]              Follow-up Information     Follow up with Juaquin Vu MD. Schedule an appointment as soon as possible for a visit in 2 weeks.    Specialty:  Colon and Rectal Surgery    Contact information:    [address]  [facility] [address]  [phone]          Discharge Instructions     Future Orders    Activity as tolerated     Call MD for:  persistent nausea and vomiting     Call MD for:  redness, tenderness, or signs of infection (pain, swelling, redness, odor or green/yellow discharge around incision site)     Call MD for:  severe uncontrolled pain     Call MD for:  temperature >100.4     Diet general     Questions:    Total calories:      Fat restriction, if any:      Protein restriction, if any:      Na restriction, if any:      Fluid restriction:      Additional restrictions:      Diet general      Questions:    Total calories:      Fat restriction, if any:      Protein restriction, if any:      Na restriction, if any:      Fluid restriction:      Additional restrictions:      No dressing needed     Remove dressing in 24 hours     Wound care routine (specify)     Comments:    Wound care routine:    Some bleeding expected  Take fiber supplements such as Metamucil, Citrucel, Konsyl,  The goal is 1-2 nonstraining nonloose bowel movements per day.  Sitz Baths or tub soaks three times a day in warm water  We recommend 25-30 grams of fiber daily to reach the above bowel movement goal.        Discharge Instructions       Home Care Instructions  ANAL/RECTAL SURGERY     ACTIVITY LEVEL:    If you received sedation and/or an anesthetic, you may feel sleepy for several hours. Rest until you feel more awake. Gradually resume your normal activities.    DIET:    At home, continue with liquids. If there is no nausea, you may eat a soft diet and gradually resume a high fiber diet. Encourage fluids.    SPECIAL INSTRUCTIONS:    Eat plenty of fruits and vegetables. Drink 8-10 glasses of water or juice every day. Eat whole grain cereals and breads. Salads with raw vegetables are also a good source of fiber.    DRESSING:    Remove your bandage _________________________________ while soaking. Start taking a sitz bath _________________. You should soak in clear warm water ___________ times a day and after each bowel movement. You may use 4 x 4 gauze to the surgical area for any drainage. Wash the area with mild soap during your regular bath. Use soft, damp tissue or disposable wipes not containing alcohol when wiping after bowel movements, pat the area and gently dry. Avoid excessive or vigorous wiping. It may help to place soft tissue or a cotton pad between the buttocks to keep the cheeks  and to absorb any moisture.    MEDICIATIONS:    You will receive instructions for your pain and/or antibiotic prescriptions. Pain  "medication should be taken only if needed, and as directed. Antibiotics should be taken as directed until the entire prescription is completed. If ordered, take stool softeners as directed.    WHEN TO CALL THE DOCTOR:     For any obvious active bleeding   Redness or swelling around the incision   Fever over 101°F (38.4°C)   Drainage (pus) from the wound   Persistent pain not relieved by the pain medication    RETURN APPOINTMENT:  _________________________________________________________________________________________    FOR EMERGENCIES:    If any unusual problems or difficulties occur, contact  ___________________ or the resident at [phone] (page ) or at the Clinic office, [phone].            Primary Diagnosis     Your primary diagnosis was:  Anal Fistula      Admission Information     Date & Time Provider Department CSN    5/3/2017  7:45 AM Juaquin Vu MD [facility] [id number]      Care Providers     Provider Role Specialty Primary office phone    Juaquin Vu MD Attending Provider Colon and Rectal Surgery [phone]    Juaquin Vu MD Surgeon  Colon and Rectal Surgery [phone]      Your Vitals Were     BP Pulse Temp Resp Height Weight    177/89 50 97.9 °F (36.6 °C) (Oral) 16 5' 11" (1.803 m) 73 kg (161 lb)    SpO2 BMI             99% 22.45 kg/m2         Recent Lab Values     No lab values to display.      Allergies as of 5/3/2017     No Known Allergies      Advance Directives     An advance directive is a document which, in the event you are no longer able to make decisions for yourself, tells your healthcare team what kind of treatment you do or do not want to receive, or who you would like to make those decisions for you.  If you do not currently have an advance directive, [facility] encourages you to create one.  For more information call:  [phone]), [phone] ([phone]),  or log on to [email or web address].      "   Language Assistance Services     ATTENTION: Language assistance services are available, free of charge. Please call [phone].      ATENCIÓN: Si habla ab, tiene a villatoro disposición servicios gratuitos de asistencia lingüística. Llame al [phone].     CHÚ Ý: N?u b?n nói Ti?ng Vi?t, có các d?ch v? h? tr? ngôn ng? mi?n phí dành cho b?n. G?i s? [phone].         [facility] complies with applicable Federal civil rights laws and does not discriminate on the basis of race, color, national origin, age, disability, or sex.

## 2017-05-03 NOTE — PLAN OF CARE
Pt tolerated procedure/anesthesia well, vss back to baseline, no distress noted or reported, denies pain, tolerated PO without difficulties, discharge instructions reviewed with pt and family, verbalized understanding, consents with chart, pt waiting to void to finalize discharge.

## 2017-05-03 NOTE — TRANSFER OF CARE
"Anesthesia Transfer of Care Note    Patient: Aguilar Waldrop    Procedure(s) Performed: Procedure(s) (LRB):  EXAM UNDER ANESTHESIA (N/A)  PLACEMENT-SETON DRAIN (N/A)    Patient location: PACU    Anesthesia Type: general    Transport from OR: Transported from OR on 6-10 L/min O2 by face mask with adequate spontaneous ventilation    Post pain: adequate analgesia    Post assessment: no apparent anesthetic complications    Post vital signs: stable    Level of consciousness: awake, alert and oriented    Nausea/Vomiting: no nausea/vomiting    Complications: none          Last vitals:   Visit Vitals    BP (!) 151/73 (BP Location: Right arm, Patient Position: Lying, BP Method: Automatic)    Pulse 65    Temp 36.8 °C (98.2 °F) (Oral)    Resp 18    Ht 5' 11" (1.803 m)    Wt 73 kg (161 lb)    SpO2 100%    BMI 22.45 kg/m2     "

## 2017-05-04 ENCOUNTER — TELEPHONE (OUTPATIENT)
Dept: SURGERY | Facility: CLINIC | Age: 72
End: 2017-05-04

## 2017-05-04 VITALS
WEIGHT: 161 LBS | HEIGHT: 71 IN | DIASTOLIC BLOOD PRESSURE: 70 MMHG | OXYGEN SATURATION: 99 % | RESPIRATION RATE: 16 BRPM | SYSTOLIC BLOOD PRESSURE: 171 MMHG | TEMPERATURE: 98 F | BODY MASS INDEX: 22.54 KG/M2 | HEART RATE: 55 BPM

## 2017-05-04 NOTE — OP NOTE
DATE OF PROCEDURE:  05/03/2017    PREOPERATIVE DIAGNOSIS:  Anal fistula.    POSTOPERATIVE DIAGNOSIS:  Anal fistula.    PROCEDURE:  Exam under anesthesia, placement of seton.    SURGEON:  Juaquin Vu M.D.    RESIDENT:  Dr. Gordillo.    ANESTHESIA:  General endotracheal.    INDICATION:  Mr. Waldrop is a 71-year-old male with recurrent anal fistula,   here for definitive therapy.    PROCEDURE IN DETAIL:  The patient was taken to the Operating Room, placed in a   prone jackknife position under general anesthesia.  Buttock was effaced.    Perineum was prepped and draped in a sterile manner.  On inspection of the   perineum in the left lateral position found to have a punctate opening that was   draining pus.  There was a fair amount of perianal inflammation.  Based on this,   a probe was passed.  It was determined that a fistulotomy may involve too much   muscle and a seton was placed.  A silk suture was placed through the tract and   exchanged for a vessel loop.  This vessel loop was secured in place.  Hemostasis   was assured.  Sponge and needle count was correct.  The patient tolerated the   procedure and transported to Recovery Room in stable condition.      DANE/HN  dd: 05/03/2017 14:27:24 (CDT)  td: 05/03/2017 19:55:26 (CDT)  Doc ID   #[id number]  Job ID #[id number]    CC:

## 2017-05-04 NOTE — TELEPHONE ENCOUNTER
Spoke with patient. Confirmed OK to remove gauze from rectum. Patient had no other issues or concerns. Post op clinic visit scheduled. Appointment letter mailed.

## 2017-05-04 NOTE — TELEPHONE ENCOUNTER
----- Message from Charity Lisa sent at 5/4/2017 10:25 AM CDT -----  Contact: Pt# [phone]  Pt states he had surgery 5/3 and is having some issues Pt would like to speak to the nurse he have some questions

## 2017-05-23 ENCOUNTER — OFFICE VISIT (OUTPATIENT)
Dept: SURGERY | Facility: CLINIC | Age: 72
End: 2017-05-23
Payer: MEDICARE

## 2017-05-23 VITALS
DIASTOLIC BLOOD PRESSURE: 62 MMHG | HEART RATE: 64 BPM | BODY MASS INDEX: 22.84 KG/M2 | SYSTOLIC BLOOD PRESSURE: 119 MMHG | WEIGHT: 163.13 LBS | HEIGHT: 71 IN

## 2017-05-23 DIAGNOSIS — Z98.890 POSTOPERATIVE STATE: Primary | ICD-10-CM

## 2017-05-23 PROCEDURE — 99999 PR PBB SHADOW E&M-EST. PATIENT-LVL III: CPT | Mod: PBBFAC,,, | Performed by: COLON & RECTAL SURGERY

## 2017-05-23 PROCEDURE — 99024 POSTOP FOLLOW-UP VISIT: CPT | Mod: S$GLB,,, | Performed by: COLON & RECTAL SURGERY

## 2017-05-23 NOTE — PROGRESS NOTES
CRS Post-operative visit    Visit Info:     DATE OF PROCEDURE:  05/03/2017     PREOPERATIVE DIAGNOSIS:  Anal fistula.     POSTOPERATIVE DIAGNOSIS:  Anal fistula.     PROCEDURE:  Exam under anesthesia, placement of seton.      INDICATION:  Mr. Waldrop is a 71-year-old male with recurrent anal fistula,    Current Status: Doing well postoperatively.    Physical Exam:  General: White male in NAD sitting in chair in clinic  Neuro: aaox4 maex4 perrl  Respiratory: resps even unlabored  Cardiac: cap refill <2 sec  Abdomen: Normal, benign. Incisions:clean, dry, intact   Anorectal: Seton in place    Assessment and Plan:  Aguilar was seen today for follow-up.    Diagnoses and all orders for this visit:    Postoperative state    Will schedule for LIFT

## 2017-05-24 DIAGNOSIS — K60.3 FISTULA, ANAL: Primary | ICD-10-CM

## 2017-05-26 ENCOUNTER — ANESTHESIA EVENT (OUTPATIENT)
Dept: SURGERY | Facility: HOSPITAL | Age: 72
End: 2017-05-26
Payer: MEDICARE

## 2017-05-26 DIAGNOSIS — Z01.818 PREOP TESTING: Primary | ICD-10-CM

## 2017-05-26 NOTE — PRE ADMISSION SCREENING
"Anesthesia Assessment: Preoperative EQUATION    Planned Procedure: Procedure(s) (LRB):  LIGATION-INTERSPHINCTERIC FISTULA TRACT (N/A)  EXAM UNDER ANESTHESIA (N/A)  Requested Anesthesia Type:General  Surgeon: Juaquin Vu MD  Service: Colon and Rectal  Known or anticipated Date of Surgery:6/21/2017    Surgeon notes: reviewed and Fistula , anal    Electronic QUestionnaire Assessment completed via nurse interview with patient.        Aguilar Waldrop [[id number]] - 71 y.o. Male     Providers Outside of [facility]     Flowsheet Row Pre-admit from 6/21/2017 in [facility] Admission (Discharged) from 5/3/2017 in [facility]   Has outside PCP Yes [Dr. SONIA Coleman-last visit-3/2017-[facility]] Yes [Dr. SONIA Coleman-last visit[facility]3/[facility]]   Surgical Risk Level     Surgical Risk Level:  2       caRDScore (Clinical Anesthesia Rapid Decision Score)     Moderate   Total Score: 17    17 Sum of Clinical Scores   caRDScores (Grouped)     caRDScore - Ane:  4            caRDScore - CVD:  1            caRDScore - Pul:  0            caRDScore - Met:  4            caRDScore - Phy:  8       caRDScore Items     Flowsheet Row Pre-admit from 6/21/2017 in [facility] Admission (Discharged) from 5/3/2017 in [facility]   Anesthesia   Has large neck size >40cm (15.7in., large male shirt size, large male collar size >16) No data No   Has disease or surgery of the esophagus or stomach (ulcer, gastic bypass, cancer) Yes [ulcer hx] Yes [ulcer hx]   Has/has had bowel disease of small or large intestine Yes [anal fistula] Yes [Fistula-in-ano]   CVD   Activity similar to best ability for maximal activity or exercise METS 4 METS 4   Diagnosed with high blood pressure Yes Yes   Not taking BP medication but supposed to be No data No   <150/90">Typical BP runs <150/90 Yes Yes   Pulmonary   Metabolic   Type 2 Diabetes Yes Yes   Is on oral Rx and/or " non-insulin injectable for diabetes Yes Yes   Has hyperlipoproteinemia Yes Yes   Physiologic   Has an open wound (with or without an infection) Yes [fistula-in-ano] Yes [fistula-in-ano]   Without trying, clothes have become noticably looser Yes Yes   Without trying, has lost > 10% of body wt in last 6 mos Yes Yes   Had major surgery for Ca with resection of vital organ tissue (Brain, Lung, Liver, Kidney-please specify) Yes Yes [s/p Prostatectomy & radiation-x5 yrs. ago]   Has disease or surgery of the esophagus or stomach (ulcer, gastic bypass, cancer) Yes [ulcer hx] Yes [ulcer hx]   Flags     Red Flag Score:  0            Yellow Flag Score:  5       Red Flags     Flowsheet Row Admission (Discharged) from 5/3/2017 in [facility]   Not taking BP medication but supposed to be No   Yellow Flags     Flowsheet Row Pre-admit from 6/21/2017 in [facility] Admission (Discharged) from 5/3/2017 in [facility]   Has had surgery within the last 3 months Yes Yes   Takes herbal medications or vitamin supplements Yes [will stop x7 ddays prior to surgery] Yes [will stop x7 ddays prior to surgery]   NSAID Yes [Ibuprofen prn-will stop x7 days prior to sx] No data   Has disease or surgery of the esophagus or stomach (ulcer, gastic bypass, cancer) Yes [ulcer hx] Yes [ulcer hx]   Has pain Yes No data   PONV Risk Score (assumes periop narcotic use = +1, Max=4)     PONV Risk Score:  2       PONV Risk Factors   Total Score: 1        Sleep Apnea   Total Score: 0    ANGELES STOP-Bang Risk Factors (Max=8)   Total Score: 4    1 Has loud snoring   1 Takes medication for high blood pressure   1 Age >50   1 Male   ANGELES Risk Level - 1 (Low), 2 (Moderate), 3 (High)     ANGELES Risk Level:  2       RCRI (Revised Cardiac Risk Indices of ACC/AHA guidelines, Max=6)   Total Score: 1    1 Patient is having an intra-abdominal thoracic or major vascular case   CAD Risk Factors   Total Score: 6                             CHADS Score if applicable (history of atrial fib/flutter, Max=6)   Total Score: 2            Maximal Exercise Capacity     Flowsheet Row Pre-admit from 6/21/2017 in [facility]   Maximal Exercise Capacity METS 4   Summary of Dependence   Total Score: 1    1 Is totally independent of others for activities of daily living   Phone Fraility Score (Max = 17)   Total Score: 3    1 Uses 5 or more meds on reg basis   1 Without trying, clothes have become noticably looser   1 Describes health as Fair   Pain Factors     Flowsheet Row Pre-admit from 6/21/2017 in [facility]   Has pain Yes   Location and description of pain -- [fistula-burning level 5]   Typical Pain Scores 5 to 6   Uses one of the following medications: -- [Ibuprofen-prn]   Takes multiple opioid medications -- [has Percocet -does not use]   Risk Triggers (Evidence-Based Risk Triggers)     Pulmonary Risk Triggers   Total Score: 3                Renal Risk Triggers   Total Score: 2            Delirium Risk Triggers   Total Score: 0    Urologic Risk Triggers   Total Score: 0    Logistics     Pre-op Clinic Logistics   Total Score: 8                            DOSC Logistics   Total Score: 1        Discharge Logistics   Total Score: 0    Discharge Planning     Flowsheet Row Pre-admit from 6/21/2017 in [facility] Admission (Discharged) from 5/3/2017 in [facility]   Discharge Planning   Will assist patient 24/7, if needed -- [[phone]] -- [[phone]]   Anes <For office use only>     Total Score: 17      Surgical Risk Level Assessment             Triage considerations:     The patient has no apparent active cardiac condition (No unstable coronary Syndrome such as severe unstable angina or recent [<1 month] myocardial infarction, decompensated CHF, severe valvular   disease or significant arrhythmia)    Previous anesthesia  records:5/3/17-EUA-Exam under anesthesia-Placement Seton Drain-General- Airway/Jaw/Neck:  Airway Findings: Mouth Opening: Normal Tongue: Normal  Jaw/Neck Findings:  Micrognathia: Negative Neck ROM: Normal ROM   -No PONV-no apparent anesthetic complications    Anesthesia Hx:  No problems with previous Anesthesia Denies Hx of Anesthetic complications  History of prior surgery of interest to airway management or planning: Denies Family Hx of Anesthesia complications.   Denies Personal Hx of Anesthesia complications.     Last PCP note: 3-6 months ago , outside [facility] , focused visit   Subspecialty notes: Urology    Other important co-morbidities: HLD(hyperlipidemia)/HTN(Hypertension)/DM 2(Diabetes Mellitus)/USI(Urinary Stress Incontinence)/Prostate cancer history/ED/Gastric ulceration history/Inguinal hernia,unilateral hx-s/p hernia repair/arthritis-knees     Tests already available:  Results have been reviewed.POCT glucose-5/3/17            Instructions given. (See in Nurse's note)    Optimization:    Medical Opinion Indicated-Reviewed with anesthesia-Dr. Alexis-No OPOC needed at this time(had OS Cards clearance for recent C&R sx -5/3/17)           Plan:    Testing:  Hematology Profile and BMP           Patient  has previously scheduled Medical Appointment:Appointment on 6/7/17 prior to surgery date.    Navigation: Tests Scheduled. Labs-Hem Profile & BMP :(Sign & Held)- okay per Dr. Alexis                                     Straight Line to surgery.  Will have labs done a.m. Of surgery per Dr. Alexis(Patient refused to come in prior to surgery for labs & Perioperative IM Consult).           Caroline Beltrán RN  5/26/17

## 2017-05-26 NOTE — ANESTHESIA PREPROCEDURE EVALUATION
"  Anesthesia Assessment: Preoperative EQUATION    Planned Procedure: Procedure(s) (LRB):  LIGATION-INTERSPHINCTERIC FISTULA TRACT (N/A)  EXAM UNDER ANESTHESIA (N/A)  Requested Anesthesia Type:General  Surgeon: Juaquin Vu MD  Service: Colon and Rectal  Known or anticipated Date of Surgery:6/21/2017    Surgeon notes: reviewed and Fistula , anal    Electronic QUestionnaire Assessment completed via nurse interview with patient.        Aguilar Waldrop [[id number]] - 71 y.o. Male     Providers Outside of [facility]     Flowsheet Row Pre-admit from 6/21/2017 in [facility] Admission (Discharged) from 5/3/2017 in [facility]   Has outside PCP Yes [Dr. SONIA Coleman-last visit-3/2017-[facility]] Yes [Dr. SONIA Coleman-last visit-3/2017-[facility]]   Surgical Risk Level     Surgical Risk Level:  2       caRDScore (Clinical Anesthesia Rapid Decision Score)     Moderate   Total Score: 17    17 Sum of Clinical Scores   caRDScores (Grouped)     caRDScore - Ane:  4            caRDScore - CVD:  1            caRDScore - Pul:  0            caRDScore - Met:  4            caRDScore - Phy:  8       caRDScore Items     Flowsheet Row Pre-admit from 6/21/2017 in [facility] Admission (Discharged) from 5/3/2017 in [facility]   Anesthesia   Has large neck size >40cm (15.7in., large male shirt size, large male collar size >16) No data No   Has disease or surgery of the esophagus or stomach (ulcer, gastic bypass, cancer) Yes [ulcer hx] Yes [ulcer hx]   Has/has had bowel disease of small or large intestine Yes [anal fistula] Yes [Fistula-in-ano]   CVD   Activity similar to best ability for maximal activity or exercise METS 4 METS 4   Diagnosed with high blood pressure Yes Yes   Not taking BP medication but supposed to be No data No   <150/90">Typical BP runs <150/90 Yes Yes   Pulmonary   Metabolic   Type 2 Diabetes Yes Yes   Is on oral Rx " and/or non-insulin injectable for diabetes Yes Yes   Has hyperlipoproteinemia Yes Yes   Physiologic   Has an open wound (with or without an infection) Yes [fistula-in-ano] Yes [fistula-in-ano]   Without trying, clothes have become noticably looser Yes Yes   Without trying, has lost > 10% of body wt in last 6 mos Yes Yes   Had major surgery for Ca with resection of vital organ tissue (Brain, Lung, Liver, Kidney-please specify) Yes Yes [s/p Prostatectomy & radiation-x5 yrs. ago]   Has disease or surgery of the esophagus or stomach (ulcer, gastic bypass, cancer) Yes [ulcer hx] Yes [ulcer hx]   Flags     Red Flag Score:  0            Yellow Flag Score:  5       Red Flags     Flowsheet Row Admission (Discharged) from 5/3/2017 in [facility]   Not taking BP medication but supposed to be No   Yellow Flags     Flowsheet Row Pre-admit from 6/21/2017 in [facility] Admission (Discharged) from 5/3/2017 in [facility]   Has had surgery within the last 3 months Yes Yes   Takes herbal medications or vitamin supplements Yes [will stop x7 ddays prior to surgery] Yes [will stop x7 ddays prior to surgery]   NSAID Yes [Ibuprofen prn-will stop x7 days prior to sx] No data   Has disease or surgery of the esophagus or stomach (ulcer, gastic bypass, cancer) Yes [ulcer hx] Yes [ulcer hx]   Has pain Yes No data   PONV Risk Score (assumes periop narcotic use = +1, Max=4)     PONV Risk Score:  2       PONV Risk Factors   Total Score: 1        Sleep Apnea   Total Score: 0    ANGELES STOP-Bang Risk Factors (Max=8)   Total Score: 4    1 Has loud snoring   1 Takes medication for high blood pressure   1 Age >50   1 Male   ANGELES Risk Level - 1 (Low), 2 (Moderate), 3 (High)     ANGELES Risk Level:  2       RCRI (Revised Cardiac Risk Indices of ACC/AHA guidelines, Max=6)   Total Score: 1    1 Patient is having an intra-abdominal thoracic or major vascular case   CAD Risk Factors   Total Score: 6                             CHADS Score if applicable (history of atrial fib/flutter, Max=6)   Total Score: 2            Maximal Exercise Capacity     Flowsheet Row Pre-admit from 6/21/2017 in [facility]   Maximal Exercise Capacity METS 4   Summary of Dependence   Total Score: 1    1 Is totally independent of others for activities of daily living   Phone Fraility Score (Max = 17)   Total Score: 3    1 Uses 5 or more meds on reg basis   1 Without trying, clothes have become noticably looser   1 Describes health as Fair   Pain Factors     Flowsheet Row Pre-admit from 6/21/2017 in [facility]   Has pain Yes   Location and description of pain -- [fistula-burning level 5]   Typical Pain Scores 5 to 6   Uses one of the following medications: -- [Ibuprofen-prn]   Takes multiple opioid medications -- [has Percocet -does not use]   Risk Triggers (Evidence-Based Risk Triggers)     Pulmonary Risk Triggers   Total Score: 3                Renal Risk Triggers   Total Score: 2            Delirium Risk Triggers   Total Score: 0    Urologic Risk Triggers   Total Score: 0    Logistics     Pre-op Clinic Logistics   Total Score: 8                            DOSC Logistics   Total Score: 1        Discharge Logistics   Total Score: 0    Discharge Planning     Flowsheet Row Pre-admit from 6/21/2017 in [facility] Admission (Discharged) from 5/3/2017 in [facility]   Discharge Planning   Will assist patient 24/7, if needed -- [[phone]] -- [[phone]]   Anes <For office use only>     Total Score: 17      Surgical Risk Level Assessment             Triage considerations:     The patient has no apparent active cardiac condition (No unstable coronary Syndrome such as severe unstable angina or recent [<1 month] myocardial infarction, decompensated CHF, severe valvular   disease or significant arrhythmia)    Previous anesthesia  records:5/3/17-EUA-Exam under anesthesia-Placement Seton Drain-General- Airway/Jaw/Neck:  Airway Findings: Mouth Opening: Normal Tongue: Normal  Jaw/Neck Findings:  Micrognathia: Negative Neck ROM: Normal ROM   -No PONV-no apparent anesthetic complications    Anesthesia Hx:  No problems with previous Anesthesia Denies Hx of Anesthetic complications  History of prior surgery of interest to airway management or planning: Denies Family Hx of Anesthesia complications.   Denies Personal Hx of Anesthesia complications.     Last PCP note: 3-6 months ago , outside [facility] , focused visit   Subspecialty notes: Urology    Other important co-morbidities: HLD(hyperlipidemia)/HTN(Hypertension)/DM 2(Diabetes Mellitus)/USI(Urinary Stress Incontinence)/Prostate cancer history/ED/Gastric ulceration history/Inguinal hernia,unilateral hx-s/p hernia repair/arthritis-knees     Tests already available:  Results have been reviewed.POCT glucose-5/3/17            Instructions given. (See in Nurse's note)    Optimization:    Medical Opinion Indicated-Reviewed with anesthesia-Dr. Alexis-No OPOC needed at this time(had OS Cards clearance for recent C&R sx -5/3/17)           Plan:    Testing:  Hematology Profile and BMP           Patient  has previously scheduled Medical Appointment:Appointment on 6/7/17 prior to surgery date.    Navigation: Tests Scheduled. Labs-Hem Profile & BMP :(Sign & Held)- okay per Dr. Alexis                                     Straight Line to surgery.  Will have labs done a.m. Of surgery per Dr. Alexis(Patient refused to come in prior to surgery for labs & Perioperative IM Consult).           Caroline Beltrán RN  5/26/17 05/26/2017  Aguilar Arylnfranco Waldrop is a 71 y.o., male.    Anesthesia Evaluation    I have reviewed the Patient Summary Reports.    I have reviewed the Nursing Notes.   I have reviewed the  Medications.     Review of Systems  Anesthesia Hx:  No problems with previous Anesthesia    Cardiovascular:   Hypertension    Pulmonary:  Pulmonary Normal    Endocrine:   Diabetes        Physical Exam  General:  Well nourished    Airway/Jaw/Neck:  Airway Findings: Mouth Opening: Normal Tongue: Normal  General Airway Assessment: Adult  Mallampati: II  Improves to II with phonation.  TM Distance: Normal, at least 6 cm      Dental:  Dental Findings: In tact   Chest/Lungs:  Chest/Lungs Findings: Clear to auscultation     Heart/Vascular:  Heart Findings: Rate: Normal  Rhythm: Regular Rhythm  Sounds: Normal        Mental Status:  Mental Status Findings:  Cooperative, Alert and Oriented         Anesthesia Plan  Type of Anesthesia, risks & benefits discussed:  Anesthesia Type:  general  Patient's Preference:   Intra-op Monitoring Plan: standard ASA monitors  Intra-op Monitoring Plan Comments:   Post Op Pain Control Plan:   Post Op Pain Control Plan Comments:   Induction:   IV  Beta Blocker:  Patient is on a Beta-Blocker and has received one dose within the past 24 hours (No further documentation required).       Informed Consent: Patient understands risks and agrees with Anesthesia plan.  Questions answered. Anesthesia consent signed with patient.  ASA Score: 2     Day of Surgery Review of History & Physical:    H&P update referred to the surgeon.         Ready For Surgery From Anesthesia Perspective.

## 2017-05-31 ENCOUNTER — TELEPHONE (OUTPATIENT)
Dept: UROLOGY | Facility: CLINIC | Age: 72
End: 2017-05-31

## 2017-05-31 DIAGNOSIS — C61 PROSTATE CANCER: Primary | ICD-10-CM

## 2017-05-31 NOTE — TELEPHONE ENCOUNTER
----- Message from Najeannine Pittman sent at 5/31/2017 12:00 PM CDT -----  Contact: pt [phone]  Pt called to schedule his annual check up per recall letter.  Pt requested to make it in August due to a surgery he has.  Pt requested that his lab orders be sent to :   [facility]  [address] [place] [address]    He states that's where he always does his labs.    Please call pt if you have any questions or to let him know that you sent the orders out.  Pt can be reached at [phone]    Thanks  tran

## 2017-06-16 ENCOUNTER — TELEPHONE (OUTPATIENT)
Dept: SURGERY | Facility: CLINIC | Age: 72
End: 2017-06-16

## 2017-06-16 NOTE — TELEPHONE ENCOUNTER
----- Message from Mary Gracia sent at 6/14/2017 12:06 PM CDT -----  Contact: pt#[phone]  Pt has questions about possible infection. Please call

## 2017-06-16 NOTE — TELEPHONE ENCOUNTER
Spoke with patient. States drainage from SETON is now blood tinged. Denies fever, pain, redness, swelling. Surgery scheduled 6/21. Instructed to call clinic back if develops any of those symptoms.

## 2017-06-19 ENCOUNTER — TELEPHONE (OUTPATIENT)
Dept: SURGERY | Facility: CLINIC | Age: 72
End: 2017-06-19

## 2017-06-19 NOTE — TELEPHONE ENCOUNTER
Spoke with patient. Instructed to stop Advil and take Tylenol for discomfort. States was aware he was supposed to stop NSAIDS but was reluctant to take Percocet due to constipation.

## 2017-06-19 NOTE — TELEPHONE ENCOUNTER
----- Message from Linda Lindsey sent at 6/19/2017 12:21 PM CDT -----  Contact: Pt:[phone]  or [phone]  Pt called and states he would like to speak with 's nurse in regards to his fistula not draining. Pt states he is also taking ibuprofen (ADVIL,MOTRIN) 800 MG tablet and pt would like to know if it would interfere with his surgery.

## 2017-06-20 ENCOUNTER — TELEPHONE (OUTPATIENT)
Dept: SURGERY | Facility: CLINIC | Age: 72
End: 2017-06-20

## 2017-06-20 NOTE — TELEPHONE ENCOUNTER
----- Message from Karely Moya sent at 6/20/2017 11:50 AM CDT -----  Patient states that his fistula stopped draining and he thinks another fistula has popped up.

## 2017-06-20 NOTE — TELEPHONE ENCOUNTER
Spoke with patient. Informed him that Dr. Vu is aware that a second fistula may now be present. Surgery scheduled tomorrow.

## 2017-06-21 ENCOUNTER — ANESTHESIA (OUTPATIENT)
Dept: SURGERY | Facility: HOSPITAL | Age: 72
End: 2017-06-21
Payer: MEDICARE

## 2017-06-21 ENCOUNTER — HOSPITAL ENCOUNTER (OUTPATIENT)
Facility: HOSPITAL | Age: 72
Discharge: HOME | End: 2017-06-21
Attending: COLON & RECTAL SURGERY | Admitting: COLON & RECTAL SURGERY
Payer: MEDICARE

## 2017-06-21 DIAGNOSIS — K60.3 ANAL FISTULA: ICD-10-CM

## 2017-06-21 DIAGNOSIS — Z01.818 PREOP TESTING: ICD-10-CM

## 2017-06-21 LAB
ANION GAP SERPL CALC-SCNC: 9 MMOL/L
BUN SERPL-MCNC: 13 MG/DL
CALCIUM SERPL-MCNC: 9.5 MG/DL
CHLORIDE SERPL-SCNC: 105 MMOL/L
CO2 SERPL-SCNC: 24 MMOL/L
CREAT SERPL-MCNC: 0.9 MG/DL
ERYTHROCYTE [DISTWIDTH] IN BLOOD BY AUTOMATED COUNT: 12.8 %
EST. GFR  (AFRICAN AMERICAN): >60 ML/MIN/1.73 M^2
EST. GFR  (NON AFRICAN AMERICAN): >60 ML/MIN/1.73 M^2
GLUCOSE SERPL-MCNC: 123 MG/DL
HCT VFR BLD AUTO: 38.2 %
HGB BLD-MCNC: 12.5 G/DL
MCH RBC QN AUTO: 29.6 PG
MCHC RBC AUTO-ENTMCNC: 32.7 %
MCV RBC AUTO: 90 FL
PLATELET # BLD AUTO: 323 K/UL
PMV BLD AUTO: 8.7 FL
POCT GLUCOSE: 132 MG/DL (ref 70–110)
POCT GLUCOSE: 135 MG/DL (ref 70–110)
POTASSIUM SERPL-SCNC: 3.7 MMOL/L
RBC # BLD AUTO: 4.23 M/UL
SODIUM SERPL-SCNC: 138 MMOL/L
WBC # BLD AUTO: 10.29 K/UL

## 2017-06-21 PROCEDURE — 63600175 PHARM REV CODE 636 W HCPCS

## 2017-06-21 PROCEDURE — D9220A PRA ANESTHESIA: Mod: CRNA,,, | Performed by: NURSE ANESTHETIST, CERTIFIED REGISTERED

## 2017-06-21 PROCEDURE — C1729 CATH, DRAINAGE: HCPCS | Performed by: COLON & RECTAL SURGERY

## 2017-06-21 PROCEDURE — 63600175 PHARM REV CODE 636 W HCPCS: Performed by: NURSE ANESTHETIST, CERTIFIED REGISTERED

## 2017-06-21 PROCEDURE — 94761 N-INVAS EAR/PLS OXIMETRY MLT: CPT

## 2017-06-21 PROCEDURE — 63600175 PHARM REV CODE 636 W HCPCS: Performed by: SURGERY

## 2017-06-21 PROCEDURE — 25000003 PHARM REV CODE 250: Performed by: NURSE PRACTITIONER

## 2017-06-21 PROCEDURE — 37000009 HC ANESTHESIA EA ADD 15 MINS: Performed by: COLON & RECTAL SURGERY

## 2017-06-21 PROCEDURE — 25000003 PHARM REV CODE 250: Performed by: COLON & RECTAL SURGERY

## 2017-06-21 PROCEDURE — 71000033 HC RECOVERY, INTIAL HOUR: Performed by: COLON & RECTAL SURGERY

## 2017-06-21 PROCEDURE — D9220A PRA ANESTHESIA: Mod: ANES,,, | Performed by: ANESTHESIOLOGY

## 2017-06-21 PROCEDURE — 46050 I&D PERIANAL ABSCESS SUPFC: CPT | Mod: ,,, | Performed by: COLON & RECTAL SURGERY

## 2017-06-21 PROCEDURE — 36415 COLL VENOUS BLD VENIPUNCTURE: CPT

## 2017-06-21 PROCEDURE — 25000003 PHARM REV CODE 250: Performed by: NURSE ANESTHETIST, CERTIFIED REGISTERED

## 2017-06-21 PROCEDURE — 25000003 PHARM REV CODE 250: Performed by: SURGERY

## 2017-06-21 PROCEDURE — 85027 COMPLETE CBC AUTOMATED: CPT

## 2017-06-21 PROCEDURE — 36000707: Performed by: COLON & RECTAL SURGERY

## 2017-06-21 PROCEDURE — 25000003 PHARM REV CODE 250

## 2017-06-21 PROCEDURE — 82962 GLUCOSE BLOOD TEST: CPT | Performed by: COLON & RECTAL SURGERY

## 2017-06-21 PROCEDURE — 36000706: Performed by: COLON & RECTAL SURGERY

## 2017-06-21 PROCEDURE — 63600175 PHARM REV CODE 636 W HCPCS: Performed by: ANESTHESIOLOGY

## 2017-06-21 PROCEDURE — 71000015 HC POSTOP RECOV 1ST HR: Performed by: COLON & RECTAL SURGERY

## 2017-06-21 PROCEDURE — 71000039 HC RECOVERY, EACH ADD'L HOUR: Performed by: COLON & RECTAL SURGERY

## 2017-06-21 PROCEDURE — 37000008 HC ANESTHESIA 1ST 15 MINUTES: Performed by: COLON & RECTAL SURGERY

## 2017-06-21 PROCEDURE — 80048 BASIC METABOLIC PNL TOTAL CA: CPT

## 2017-06-21 RX ORDER — SUCCINYLCHOLINE CHLORIDE 20 MG/ML
INJECTION INTRAMUSCULAR; INTRAVENOUS
Status: DISCONTINUED | OUTPATIENT
Start: 2017-06-21 | End: 2017-06-21

## 2017-06-21 RX ORDER — LOSARTAN POTASSIUM 25 MG/1
TABLET ORAL
Status: COMPLETED
Start: 2017-06-21 | End: 2017-06-21

## 2017-06-21 RX ORDER — ONDANSETRON 2 MG/ML
4 INJECTION INTRAMUSCULAR; INTRAVENOUS EVERY 12 HOURS PRN
Status: DISCONTINUED | OUTPATIENT
Start: 2017-06-21 | End: 2017-06-21 | Stop reason: HOSPADM

## 2017-06-21 RX ORDER — HYDROMORPHONE HYDROCHLORIDE 1 MG/ML
0.2 INJECTION, SOLUTION INTRAMUSCULAR; INTRAVENOUS; SUBCUTANEOUS EVERY 5 MIN PRN
Status: DISCONTINUED | OUTPATIENT
Start: 2017-06-21 | End: 2017-06-21 | Stop reason: HOSPADM

## 2017-06-21 RX ORDER — OXYCODONE AND ACETAMINOPHEN 5; 325 MG/1; MG/1
TABLET ORAL
Status: DISCONTINUED
Start: 2017-06-21 | End: 2017-06-21 | Stop reason: WASHOUT

## 2017-06-21 RX ORDER — KETOROLAC TROMETHAMINE 30 MG/ML
INJECTION, SOLUTION INTRAMUSCULAR; INTRAVENOUS
Status: DISCONTINUED | OUTPATIENT
Start: 2017-06-21 | End: 2017-06-21

## 2017-06-21 RX ORDER — SODIUM CHLORIDE 9 MG/ML
INJECTION, SOLUTION INTRAVENOUS CONTINUOUS
Status: DISCONTINUED | OUTPATIENT
Start: 2017-06-21 | End: 2017-06-21 | Stop reason: HOSPADM

## 2017-06-21 RX ORDER — MIDAZOLAM HYDROCHLORIDE 1 MG/ML
INJECTION, SOLUTION INTRAMUSCULAR; INTRAVENOUS
Status: DISCONTINUED | OUTPATIENT
Start: 2017-06-21 | End: 2017-06-21

## 2017-06-21 RX ORDER — LIDOCAINE HYDROCHLORIDE 10 MG/ML
1 INJECTION, SOLUTION EPIDURAL; INFILTRATION; INTRACAUDAL; PERINEURAL ONCE
Status: COMPLETED | OUTPATIENT
Start: 2017-06-21 | End: 2017-06-21

## 2017-06-21 RX ORDER — HYDROMORPHONE HYDROCHLORIDE 1 MG/ML
1 INJECTION, SOLUTION INTRAMUSCULAR; INTRAVENOUS; SUBCUTANEOUS EVERY 4 HOURS PRN
Status: DISCONTINUED | OUTPATIENT
Start: 2017-06-21 | End: 2017-06-21 | Stop reason: HOSPADM

## 2017-06-21 RX ORDER — ACETAMINOPHEN 325 MG/1
325 TABLET ORAL EVERY 6 HOURS PRN
Status: ON HOLD | COMMUNITY
End: 2017-07-08 | Stop reason: HOSPADM

## 2017-06-21 RX ORDER — ROCURONIUM BROMIDE 10 MG/ML
INJECTION, SOLUTION INTRAVENOUS
Status: DISCONTINUED | OUTPATIENT
Start: 2017-06-21 | End: 2017-06-21

## 2017-06-21 RX ORDER — FENTANYL CITRATE 50 UG/ML
INJECTION, SOLUTION INTRAMUSCULAR; INTRAVENOUS
Status: DISCONTINUED | OUTPATIENT
Start: 2017-06-21 | End: 2017-06-21

## 2017-06-21 RX ORDER — CARVEDILOL 12.5 MG/1
TABLET ORAL
Status: COMPLETED
Start: 2017-06-21 | End: 2017-06-21

## 2017-06-21 RX ORDER — LIDOCAINE HCL/PF 100 MG/5ML
SYRINGE (ML) INTRAVENOUS
Status: DISCONTINUED | OUTPATIENT
Start: 2017-06-21 | End: 2017-06-21

## 2017-06-21 RX ORDER — OXYCODONE AND ACETAMINOPHEN 5; 325 MG/1; MG/1
1-2 TABLET ORAL
Qty: 60 TABLET | Refills: 0 | Status: SHIPPED | OUTPATIENT
Start: 2017-06-21 | End: 2017-06-21

## 2017-06-21 RX ORDER — PROPOFOL 10 MG/ML
VIAL (ML) INTRAVENOUS
Status: DISCONTINUED | OUTPATIENT
Start: 2017-06-21 | End: 2017-06-21

## 2017-06-21 RX ORDER — OXYCODONE AND ACETAMINOPHEN 5; 325 MG/1; MG/1
1-2 TABLET ORAL
Qty: 60 TABLET | Refills: 0 | Status: ON HOLD | OUTPATIENT
Start: 2017-06-21 | End: 2017-07-08 | Stop reason: HOSPADM

## 2017-06-21 RX ORDER — ACETAMINOPHEN 10 MG/ML
INJECTION, SOLUTION INTRAVENOUS
Status: DISCONTINUED | OUTPATIENT
Start: 2017-06-21 | End: 2017-06-21

## 2017-06-21 RX ORDER — HYDROCODONE BITARTRATE AND ACETAMINOPHEN 5; 325 MG/1; MG/1
1 TABLET ORAL EVERY 4 HOURS PRN
Status: DISCONTINUED | OUTPATIENT
Start: 2017-06-21 | End: 2017-06-21 | Stop reason: HOSPADM

## 2017-06-21 RX ORDER — HYDROMORPHONE HYDROCHLORIDE 1 MG/ML
INJECTION, SOLUTION INTRAMUSCULAR; INTRAVENOUS; SUBCUTANEOUS
Status: COMPLETED
Start: 2017-06-21 | End: 2017-06-21

## 2017-06-21 RX ORDER — BUPIVACAINE HYDROCHLORIDE AND EPINEPHRINE 2.5; 5 MG/ML; UG/ML
INJECTION, SOLUTION EPIDURAL; INFILTRATION; INTRACAUDAL; PERINEURAL
Status: DISCONTINUED | OUTPATIENT
Start: 2017-06-21 | End: 2017-06-21 | Stop reason: HOSPADM

## 2017-06-21 RX ORDER — CARVEDILOL 12.5 MG/1
25 TABLET ORAL ONCE
Status: COMPLETED | OUTPATIENT
Start: 2017-06-21 | End: 2017-06-21

## 2017-06-21 RX ORDER — LOSARTAN POTASSIUM 25 MG/1
25 TABLET ORAL ONCE
Status: COMPLETED | OUTPATIENT
Start: 2017-06-21 | End: 2017-06-21

## 2017-06-21 RX ADMIN — ROCURONIUM BROMIDE 5 MG: 10 INJECTION, SOLUTION INTRAVENOUS at 09:06

## 2017-06-21 RX ADMIN — LOSARTAN POTASSIUM 25 MG: 25 TABLET ORAL at 11:06

## 2017-06-21 RX ADMIN — HYDROMORPHONE HYDROCHLORIDE 0.2 MG: 1 INJECTION, SOLUTION INTRAMUSCULAR; INTRAVENOUS; SUBCUTANEOUS at 11:06

## 2017-06-21 RX ADMIN — HYDROMORPHONE HYDROCHLORIDE 0.2 MG: 1 INJECTION, SOLUTION INTRAMUSCULAR; INTRAVENOUS; SUBCUTANEOUS at 12:06

## 2017-06-21 RX ADMIN — ONDANSETRON 4 MG: 2 INJECTION INTRAMUSCULAR; INTRAVENOUS at 02:06

## 2017-06-21 RX ADMIN — MIDAZOLAM HYDROCHLORIDE 2 MG: 1 INJECTION, SOLUTION INTRAMUSCULAR; INTRAVENOUS at 09:06

## 2017-06-21 RX ADMIN — HYDROCODONE BITARTRATE AND ACETAMINOPHEN 1 TABLET: 5; 325 TABLET ORAL at 10:06

## 2017-06-21 RX ADMIN — CARVEDILOL 25 MG: 12.5 TABLET, FILM COATED ORAL at 11:06

## 2017-06-21 RX ADMIN — FENTANYL CITRATE 50 MCG: 50 INJECTION, SOLUTION INTRAMUSCULAR; INTRAVENOUS at 10:06

## 2017-06-21 RX ADMIN — ACETAMINOPHEN 1000 MG: 10 INJECTION, SOLUTION INTRAVENOUS at 10:06

## 2017-06-21 RX ADMIN — SODIUM CHLORIDE, SODIUM GLUCONATE, SODIUM ACETATE, POTASSIUM CHLORIDE, MAGNESIUM CHLORIDE, SODIUM PHOSPHATE, DIBASIC, AND POTASSIUM PHOSPHATE: .53; .5; .37; .037; .03; .012; .00082 INJECTION, SOLUTION INTRAVENOUS at 09:06

## 2017-06-21 RX ADMIN — SODIUM CHLORIDE: 0.9 INJECTION, SOLUTION INTRAVENOUS at 09:06

## 2017-06-21 RX ADMIN — LOSARTAN POTASSIUM 25 MG: 25 TABLET, FILM COATED ORAL at 11:06

## 2017-06-21 RX ADMIN — LIDOCAINE HYDROCHLORIDE 0.2 MG: 10 INJECTION, SOLUTION EPIDURAL; INFILTRATION; INTRACAUDAL; PERINEURAL at 09:06

## 2017-06-21 RX ADMIN — SUCCINYLCHOLINE CHLORIDE 140 MG: 20 INJECTION, SOLUTION INTRAMUSCULAR; INTRAVENOUS at 09:06

## 2017-06-21 RX ADMIN — CARVEDILOL 25 MG: 12.5 TABLET ORAL at 11:06

## 2017-06-21 RX ADMIN — FENTANYL CITRATE 100 MCG: 50 INJECTION, SOLUTION INTRAMUSCULAR; INTRAVENOUS at 09:06

## 2017-06-21 RX ADMIN — LIDOCAINE HYDROCHLORIDE 80 MG: 20 INJECTION, SOLUTION INTRAVENOUS at 09:06

## 2017-06-21 RX ADMIN — KETOROLAC TROMETHAMINE 15 MG: 30 INJECTION, SOLUTION INTRAMUSCULAR; INTRAVENOUS at 10:06

## 2017-06-21 RX ADMIN — PROPOFOL 150 MG: 10 INJECTION, EMULSION INTRAVENOUS at 09:06

## 2017-06-21 NOTE — H&P
SUBJECTIVE:      Chief Complaint: fistula      History of Present Illness:  Patient is a 71 y.o. male who presents with a one month history of drainage near his anus.  He reported intiially having sever pain x 5 days and spontaneous drainage there after from his anus.  He reports the drainage occurs daily and is worse with bowel movements.  He states his bowel do not cause pain.  He had a CSP 10 years prior and is due for one this August.  He denies any fevers or chills.  He reports a similar episode 20 years prior that spontaneously resolved.  he has a seton in place     Review of patient's allergies indicates:  No Known Allergies          Past Medical History:   Diagnosis Date    Allergy      Hypertension      Prostate cancer      Urinary leakage              Past Surgical History:   Procedure Laterality Date    HERNIA REPAIR         x3    PROSTATECTOMY        radiation treatment                Family History   Problem Relation Age of Onset    Cancer Father      Cancer Mother             Social History   Substance Use Topics    Smoking status: Never Smoker    Smokeless tobacco: None    Alcohol use No         Review of Systems:  Constitutional: no fever or chills  Eyes: no visual changes  ENT: no nasal congestion or sore throat  Respiratory: no cough or shortness of breath  Cardiovascular: no chest pain or palpitations  Gastrointestinal: no nausea or vomiting, tolerating diet     OBJECTIVE:      Vital Signs (Most Recent)        Physical Exam:  General: White male in NAD sitting in chair in clinic  Neuro: aaox4 maex4 perrl  Respiratory: resps even unlabored  Cardiac: cap refill <2 sec  Abdomen: Normal, benign.  Extremities: Warm dry and intact  Anorectal: deferred, anal fistula appreciated with purulent drainage noted, tender to palpation, WILLIAM not performed      Laboratory  CBC:   BMP:      Diagnostic Results:  none     ASSESSMENT/PLAN:      LIFT          Admission paperwork was accomplished including  operative consent and consent for blood and blood product administration.     The procedure was explained to the patient including indications, risks and alternatives. The patient verbalized understanding and has given informed consent.  No change in H+P I have again explained the procedure including indications, alternatives, expected outcomes and potential complications. All questions were answered

## 2017-06-21 NOTE — OP NOTE
Operative Note       Surgery Date: 6/21/2017     Staff surgeon: Surgeon(s) and Role:     * Juaquin Vu MD - Primary     * Mae Moyer MD - Resident - Assisting    Surgeon: Mae Bourgeois    Pre-op Diagnosis:  Fistula, anal [K60.3]    Post-op Diagnosis:  Fistula, anal [K60.3]    Procedure:  Procedure(s) (LRB):  LIGATION-INTERSPHINCTERIC FISTULA TRACT (N/A)  EXAM UNDER ANESTHESIA (N/A)  INCISION-ABCESS-PERIANAL    Anesthesia: General    Findings:  SUPERFICIAL FISTULA , LATERAL EXTENSION WITH ABSCESS AND CAVITY    Specimen:  None    Indications for procedure  Patient is a 71 y.o. male who presents with a one month history of drainage near his anus.  He reported intiially having sever pain x 5 days and spontaneous drainage there after from his anus.  He reports the drainage occurs daily and is worse with bowel movements.  He states his bowel do not cause pain.  He had a CSP 10 years prior and is due for one this August.  He denies any fevers or chills.  He reports a similar episode 20 years prior that spontaneously resolved.  he has a seton in place    Estimated Blood Loss: 10cc    Procedure in detail: after informed consent was reviewed and patient marked. He was brought back to the operating room, positioned in Prone jackknife  position, and after adequate anesthesia, a time out was done appropriately to identify two patient identifiers, site, side and procedure to be performed, it was confirmed that preoperative antibiotics were administered.           We placed a hill rdz lighted retractor Upon inspection we noted an abscess cavity in the left anterior position, this drained directly into the anal canal opening of the fistula. As the seton was placed previously dena  Superficial position, we divided the tissue over the S probe, the left lateral external skin was also incised and we used a curette to remove the granulation tissue at the base of the wound.  Hemostasis was obtained. We then  explored the abscess cavity and a 12 fr Malecot drain was placed in the cavity.   A 2-0 chromic suture was used to close the deep muscular layer where we divided the tissue overlying the seton  We placed a gelfoam in the anal canal, and vaseline gauze.     Disposition: PACU - hemodynamically stable.           Condition: Good

## 2017-06-21 NOTE — BRIEF OP NOTE
Operative Note     SUMMARY     Surgery Date: 6/21/2017     Surgeon(s) and Role:     * Juaquin Vu MD - Primary     * Mae Moyer MD - Resident - Assisting    Pre-op Diagnosis:  Fistula, anal [K60.3]    Post-op Diagnosis:  Post-Op Diagnosis Codes:     * Fistula, anal [K60.3]    Procedure(s) (LRB):  fISTULOTOMY  DRAIN OF ABSCESS CAVITY  EXAM UNDER ANESTHESIA (N/A)    Anesthesia: General    Description of Procedure:   FISTULOTOMY   DRAINAGE OF ABCESS CAVITY    Findings/Key Components:  SUPERFICIAL FISTULA , LATERAL EXTENSION WITH ABSCESS AND CAVITY    Estimated Blood Loss:  20         Specimens     None            Discharge Note      SUMMARY     Admit Date: 6/21/2017    Attending Physician: Juaquin Vu MD     Discharge Physician: Juaquin Vu MD    Discharge Date: 6/21/2017     Final Diagnosis: Post-Op Diagnosis Codes:     * Fistula, anal [K60.3]    Hospital Course: Patient was admitted for an outpatient procedure and tolerated the procedure well with no complications.    Disposition: Home or Self Care    Follow Up/Patient Instructions:   Current Discharge Medication List      CONTINUE these medications which have NOT CHANGED    Details   acetaminophen (TYLENOL) 325 MG tablet Take 325 mg by mouth every 6 (six) hours as needed for Pain.      atorvastatin (LIPITOR) 20 MG tablet Take 20 mg by mouth once daily.       carvedilol (COREG) 25 MG tablet Take 25 mg by mouth 2 (two) times daily with meals.      glimepiride (AMARYL) 1 MG tablet Take 1 mg by mouth 2 (two) times daily.       loratadine (CLARITIN) 10 mg tablet Take 10 mg by mouth once daily.      losartan (COZAAR) 25 MG tablet Take 25 mg by mouth once daily.      metformin (GLUCOPHAGE) 1000 MG tablet Take 1,000 mg by mouth 2 (two) times daily with meals.       oxycodone-acetaminophen (PERCOCET) 5-325 mg per tablet Take 1 tablet by mouth every 4 (four) hours as needed for Pain.  Qty: 90 tablet, Refills: 0      ibuprofen (ADVIL,MOTRIN)  800 MG tablet Take 1 tablet (800 mg total) by mouth 3 (three) times daily.  Qty: 90 tablet, Refills: 3      multivitamin capsule Take 1 capsule by mouth once daily.             Discharge Procedure Orders (must include Diet, Follow-up, Activity)    Discharge Procedure Orders (must include Diet, Follow-up, Activity)  BASIC METABOLIC PANEL   Standing Status: Future  Standing Exp. Date: 07/25/18     HEMATOLOGY PROFILE   Standing Status: Future  Standing Exp. Date: 07/25/18      Regular diet  Activity ad keyonna  Remove packing in am  Keep drain covered with gauze  Shower tid  RTC 1 weeks

## 2017-06-21 NOTE — PLAN OF CARE
Patient states they are ready to be discharged. Instructions and prescription given to patient and family. Both verbalize understanding. Patient tolerating PO liquids with no difficulty. Patient states pain is at a tolerable level for them. Anesthesia and surgical consent in chart upon discharge.

## 2017-06-21 NOTE — DISCHARGE INSTRUCTIONS
May shower tomorrow and remove gauze in anal canal  Place pad and mesh underwear  May do sitz baths  No special care for the drain in the buttock area    Keep stools soft

## 2017-06-21 NOTE — ANESTHESIA RELEASE NOTE
Anesthesia Release from PACU Note    Patient name: Aguilar Waldrop    Procedure(s): Procedure(s) (LRB):  LIGATION-INTERSPHINCTERIC FISTULA TRACT (N/A)  EXAM UNDER ANESTHESIA (N/A)  INCISION-ABCESS-PERIANAL    Anesthesia type: general    Post pain: adequate analgesia    Post assessment: no apparent complications    Last vitals:   Vitals:    06/21/17 1100   BP: (!) 175/84   Pulse: 60   Resp: 14   Temp:        Post vital signs: stable    Level of consciousness: alert     Nausea/Vomiting: no nausea/no vomiting    Complications: none    Airway Patency:  patent    Respiratory: unassisted    Cardiovascular: stable and blood pressure at baseline    Hydration: euvolemic

## 2017-06-21 NOTE — ANESTHESIA POSTPROCEDURE EVALUATION
"Anesthesia Post Evaluation    Patient: Aguilar Waldrop    Procedure(s) Performed: Procedure(s) (LRB):  LIGATION-INTERSPHINCTERIC FISTULA TRACT (N/A)  EXAM UNDER ANESTHESIA (N/A)  INCISION-ABCESS-PERIANAL    Final Anesthesia Type: general  Patient location during evaluation: PACU  Patient participation: Yes- Able to Participate  Level of consciousness: awake  Post-procedure vital signs: reviewed and stable  Pain management: adequate  Airway patency: patent  PONV status at discharge: No PONV  Anesthetic complications: no      Cardiovascular status: stable  Respiratory status: unassisted  Hydration status: euvolemic  Follow-up not needed.        Visit Vitals  BP (!) 175/84   Pulse 60   Temp 36.5 °C (97.7 °F) (Axillary)   Resp 14   Ht 5' 11" (1.803 m)   Wt 72.6 kg (160 lb)   SpO2 100%   BMI 22.32 kg/m²       Pain/Quiana Score: Pain Assessment Performed: Yes (6/21/2017 10:44 AM)  Presence of Pain: complains of pain/discomfort (6/21/2017 10:44 AM)  Pain Rating Prior to Med Admin: 7 (6/21/2017 11:00 AM)      "

## 2017-06-21 NOTE — PROGRESS NOTES
Report received from KORI Arellano RN; care assumed. Pt's BPs remain 170s/70s. Pt states he did not take his blood pressure medications this morning. Notified Dr. Diaz with anesthesia. Orders received to administer pt's home meds (25mg PO carvedilol and 25mg PO losartan) now per MD. Orders implemented; see MAR. Pt denies pain. No distress noted. Will continue to monitor.

## 2017-06-21 NOTE — PROGRESS NOTES
Pt's BP remains 170s/70s; HR=50s. Dr. Diaz at bedside. No new orders noted at this time. Ok to send pt to Phase II per MD.

## 2017-06-21 NOTE — PROGRESS NOTES
Pt's wife and daughter at bedside and updated on plan of care. All questions answered. Wife voices no concerns at this time.

## 2017-06-21 NOTE — TRANSFER OF CARE
"Anesthesia Transfer of Care Note    Patient: Aguilar Waldrop    Procedure(s) Performed: Procedure(s) (LRB):  LIGATION-INTERSPHINCTERIC FISTULA TRACT (N/A)  EXAM UNDER ANESTHESIA (N/A)  INCISION-ABCESS-PERIANAL    Patient location: PACU    Anesthesia Type: general    Transport from OR: Transported from OR on 6-10 L/min O2 by face mask with adequate spontaneous ventilation    Post pain: adequate analgesia    Post assessment: no apparent anesthetic complications and tolerated procedure well    Post vital signs: stable    Level of consciousness: awake    Nausea/Vomiting: no nausea/vomiting    Complications: none    Transfer of care protocol was followed      Last vitals:   Visit Vitals  BP (!) 164/83 (BP Location: Left arm, Patient Position: Lying, BP Method: Automatic)   Pulse 66   Temp 36.8 °C (98.2 °F) (Oral)   Resp 16   Ht 5' 11" (1.803 m)   Wt 72.6 kg (160 lb)   SpO2 100%   BMI 22.32 kg/m²     "

## 2017-06-22 VITALS
RESPIRATION RATE: 16 BRPM | BODY MASS INDEX: 22.4 KG/M2 | HEIGHT: 71 IN | SYSTOLIC BLOOD PRESSURE: 178 MMHG | OXYGEN SATURATION: 100 % | HEART RATE: 55 BPM | WEIGHT: 160 LBS | TEMPERATURE: 98 F | DIASTOLIC BLOOD PRESSURE: 78 MMHG

## 2017-06-29 ENCOUNTER — OFFICE VISIT (OUTPATIENT)
Dept: SURGERY | Facility: CLINIC | Age: 72
End: 2017-06-29
Payer: MEDICARE

## 2017-06-29 VITALS
HEIGHT: 71 IN | DIASTOLIC BLOOD PRESSURE: 64 MMHG | SYSTOLIC BLOOD PRESSURE: 130 MMHG | WEIGHT: 157.44 LBS | HEART RATE: 61 BPM | BODY MASS INDEX: 22.04 KG/M2

## 2017-06-29 DIAGNOSIS — Z98.890 POSTOPERATIVE STATE: Primary | ICD-10-CM

## 2017-06-29 PROCEDURE — 99999 PR PBB SHADOW E&M-EST. PATIENT-LVL II: CPT | Mod: PBBFAC,,, | Performed by: COLON & RECTAL SURGERY

## 2017-06-29 PROCEDURE — 99024 POSTOP FOLLOW-UP VISIT: CPT | Mod: S$GLB,,, | Performed by: COLON & RECTAL SURGERY

## 2017-06-29 NOTE — PROGRESS NOTES
CRS Post-operative visit    Visit Info:     Surgery Date: 6/21/2017     Procedure(s) (LRB):  fISTULOTOMY  DRAIN OF ABSCESS CAVITY  EXAM UNDER ANESTHESIA (N/A)     Anesthesia: General     Description of Procedure:   FISTULOTOMY   DRAINAGE OF ABCESS CAVITY    Current Status: Doing well postoperatively. Kevin removed    Physical Exam:  General: White male in NAD sitting in chair in clinic  Neuro: aaox4 maex4 perrl  Respiratory: resps even unlabored  Cardiac: cap refill <2 sec  Abdomen: Normal, benign. Incisions:clean, dry, intact Drain removed       Assessment and Plan:  Diagnoses and all orders for this visit:    Postoperative state    Doing well    RC 3 weeks for wound check

## 2017-07-05 ENCOUNTER — TELEPHONE (OUTPATIENT)
Dept: SURGERY | Facility: CLINIC | Age: 72
End: 2017-07-05

## 2017-07-05 NOTE — TELEPHONE ENCOUNTER
Spoke with patient. Reports large amount of drainage from fistula site. Denies fever. Clinic appointment scheduled 7/7/17.

## 2017-07-05 NOTE — TELEPHONE ENCOUNTER
----- Message from Charity Lisa sent at 7/5/2017 10:22 AM CDT -----  Contact: Pt# [phone]  Pt states he is having some pain and burning issues and would like to speak to the nurse about getting some medication

## 2017-07-07 ENCOUNTER — HOSPITAL ENCOUNTER (OUTPATIENT)
Facility: HOSPITAL | Age: 72
LOS: 1 days | Discharge: HOME | End: 2017-07-08
Attending: COLON & RECTAL SURGERY | Admitting: COLON & RECTAL SURGERY
Payer: MEDICARE

## 2017-07-07 ENCOUNTER — ANESTHESIA EVENT (OUTPATIENT)
Dept: SURGERY | Facility: HOSPITAL | Age: 72
End: 2017-07-07
Payer: MEDICARE

## 2017-07-07 ENCOUNTER — ANESTHESIA (OUTPATIENT)
Dept: SURGERY | Facility: HOSPITAL | Age: 72
End: 2017-07-07
Payer: MEDICARE

## 2017-07-07 ENCOUNTER — OFFICE VISIT (OUTPATIENT)
Dept: SURGERY | Facility: CLINIC | Age: 72
End: 2017-07-07
Payer: MEDICARE

## 2017-07-07 VITALS
BODY MASS INDEX: 22.13 KG/M2 | HEART RATE: 59 BPM | DIASTOLIC BLOOD PRESSURE: 65 MMHG | WEIGHT: 158.06 LBS | SYSTOLIC BLOOD PRESSURE: 128 MMHG | HEIGHT: 71 IN

## 2017-07-07 DIAGNOSIS — K61.1 RECTAL ABSCESS: Primary | ICD-10-CM

## 2017-07-07 DIAGNOSIS — K60.3 ANAL FISTULA: Primary | ICD-10-CM

## 2017-07-07 DIAGNOSIS — K61.1 RECTAL ABSCESS: ICD-10-CM

## 2017-07-07 LAB
POCT GLUCOSE: 65 MG/DL (ref 70–110)
POCT GLUCOSE: 77 MG/DL (ref 70–110)

## 2017-07-07 PROCEDURE — 25000003 PHARM REV CODE 250: Performed by: ANESTHESIOLOGY

## 2017-07-07 PROCEDURE — D9220A PRA ANESTHESIA: Mod: CRNA,,, | Performed by: NURSE ANESTHETIST, CERTIFIED REGISTERED

## 2017-07-07 PROCEDURE — 87070 CULTURE OTHR SPECIMN AEROBIC: CPT

## 2017-07-07 PROCEDURE — 25000003 PHARM REV CODE 250: Performed by: NURSE ANESTHETIST, CERTIFIED REGISTERED

## 2017-07-07 PROCEDURE — 71000033 HC RECOVERY, INTIAL HOUR: Performed by: COLON & RECTAL SURGERY

## 2017-07-07 PROCEDURE — 25000003 PHARM REV CODE 250: Performed by: NURSE PRACTITIONER

## 2017-07-07 PROCEDURE — D9220A PRA ANESTHESIA: Mod: ANES,,, | Performed by: ANESTHESIOLOGY

## 2017-07-07 PROCEDURE — 82962 GLUCOSE BLOOD TEST: CPT | Performed by: COLON & RECTAL SURGERY

## 2017-07-07 PROCEDURE — 46040 I&D ISCHIORCT&/PERIRCT ABSC: CPT | Mod: ,,, | Performed by: COLON & RECTAL SURGERY

## 2017-07-07 PROCEDURE — 1159F MED LIST DOCD IN RCRD: CPT | Mod: S$GLB,,, | Performed by: COLON & RECTAL SURGERY

## 2017-07-07 PROCEDURE — 63600175 PHARM REV CODE 636 W HCPCS: Performed by: ANESTHESIOLOGY

## 2017-07-07 PROCEDURE — 37000008 HC ANESTHESIA 1ST 15 MINUTES: Performed by: COLON & RECTAL SURGERY

## 2017-07-07 PROCEDURE — 63600175 PHARM REV CODE 636 W HCPCS: Performed by: NURSE ANESTHETIST, CERTIFIED REGISTERED

## 2017-07-07 PROCEDURE — 71000039 HC RECOVERY, EACH ADD'L HOUR: Performed by: COLON & RECTAL SURGERY

## 2017-07-07 PROCEDURE — 87076 CULTURE ANAEROBE IDENT EACH: CPT | Mod: 59

## 2017-07-07 PROCEDURE — 99214 OFFICE O/P EST MOD 30 MIN: CPT | Mod: 57,S$GLB,, | Performed by: COLON & RECTAL SURGERY

## 2017-07-07 PROCEDURE — 99999 PR PBB SHADOW E&M-EST. PATIENT-LVL III: CPT | Mod: PBBFAC,,, | Performed by: COLON & RECTAL SURGERY

## 2017-07-07 PROCEDURE — 87075 CULTR BACTERIA EXCEPT BLOOD: CPT

## 2017-07-07 PROCEDURE — 63600175 PHARM REV CODE 636 W HCPCS

## 2017-07-07 PROCEDURE — 36000705 HC OR TIME LEV I EA ADD 15 MIN: Performed by: COLON & RECTAL SURGERY

## 2017-07-07 PROCEDURE — 25000003 PHARM REV CODE 250: Performed by: STUDENT IN AN ORGANIZED HEALTH CARE EDUCATION/TRAINING PROGRAM

## 2017-07-07 PROCEDURE — 36000704 HC OR TIME LEV I 1ST 15 MIN: Performed by: COLON & RECTAL SURGERY

## 2017-07-07 PROCEDURE — 37000009 HC ANESTHESIA EA ADD 15 MINS: Performed by: COLON & RECTAL SURGERY

## 2017-07-07 PROCEDURE — 25000003 PHARM REV CODE 250: Performed by: COLON & RECTAL SURGERY

## 2017-07-07 PROCEDURE — 1125F AMNT PAIN NOTED PAIN PRSNT: CPT | Mod: S$GLB,,, | Performed by: COLON & RECTAL SURGERY

## 2017-07-07 PROCEDURE — 63600175 PHARM REV CODE 636 W HCPCS: Performed by: STUDENT IN AN ORGANIZED HEALTH CARE EDUCATION/TRAINING PROGRAM

## 2017-07-07 RX ORDER — SODIUM CHLORIDE 9 MG/ML
INJECTION, SOLUTION INTRAVENOUS CONTINUOUS PRN
Status: DISCONTINUED | OUTPATIENT
Start: 2017-07-07 | End: 2017-07-07

## 2017-07-07 RX ORDER — LOSARTAN POTASSIUM 25 MG/1
25 TABLET ORAL DAILY
Status: DISCONTINUED | OUTPATIENT
Start: 2017-07-07 | End: 2017-07-08 | Stop reason: HOSPADM

## 2017-07-07 RX ORDER — ROCURONIUM BROMIDE 10 MG/ML
INJECTION, SOLUTION INTRAVENOUS
Status: DISCONTINUED | OUTPATIENT
Start: 2017-07-07 | End: 2017-07-07

## 2017-07-07 RX ORDER — OXYCODONE AND ACETAMINOPHEN 5; 325 MG/1; MG/1
2 TABLET ORAL EVERY 4 HOURS PRN
Status: DISCONTINUED | OUTPATIENT
Start: 2017-07-07 | End: 2017-07-08 | Stop reason: HOSPADM

## 2017-07-07 RX ORDER — HYDRALAZINE HYDROCHLORIDE 20 MG/ML
INJECTION INTRAMUSCULAR; INTRAVENOUS
Status: COMPLETED
Start: 2017-07-07 | End: 2017-07-07

## 2017-07-07 RX ORDER — METOCLOPRAMIDE HYDROCHLORIDE 5 MG/ML
10 INJECTION INTRAMUSCULAR; INTRAVENOUS EVERY 10 MIN PRN
Status: COMPLETED | OUTPATIENT
Start: 2017-07-07 | End: 2017-07-07

## 2017-07-07 RX ORDER — ATORVASTATIN CALCIUM 10 MG/1
20 TABLET, FILM COATED ORAL DAILY
Status: DISCONTINUED | OUTPATIENT
Start: 2017-07-08 | End: 2017-07-08 | Stop reason: HOSPADM

## 2017-07-07 RX ORDER — HYDROMORPHONE HYDROCHLORIDE 1 MG/ML
INJECTION, SOLUTION INTRAMUSCULAR; INTRAVENOUS; SUBCUTANEOUS
Status: COMPLETED
Start: 2017-07-07 | End: 2017-07-07

## 2017-07-07 RX ORDER — CARVEDILOL 6.25 MG/1
25 TABLET ORAL 2 TIMES DAILY WITH MEALS
Status: DISCONTINUED | OUTPATIENT
Start: 2017-07-07 | End: 2017-07-08 | Stop reason: HOSPADM

## 2017-07-07 RX ORDER — OXYCODONE AND ACETAMINOPHEN 5; 325 MG/1; MG/1
1 TABLET ORAL EVERY 4 HOURS PRN
Status: DISCONTINUED | OUTPATIENT
Start: 2017-07-07 | End: 2017-07-08 | Stop reason: HOSPADM

## 2017-07-07 RX ORDER — SODIUM CHLORIDE 0.9 % (FLUSH) 0.9 %
3 SYRINGE (ML) INJECTION
Status: DISCONTINUED | OUTPATIENT
Start: 2017-07-07 | End: 2017-07-07 | Stop reason: HOSPADM

## 2017-07-07 RX ORDER — OXYCODONE AND ACETAMINOPHEN 5; 325 MG/1; MG/1
TABLET ORAL
Status: COMPLETED
Start: 2017-07-07 | End: 2017-07-07

## 2017-07-07 RX ORDER — SUCCINYLCHOLINE CHLORIDE 20 MG/ML
INJECTION INTRAMUSCULAR; INTRAVENOUS
Status: DISCONTINUED | OUTPATIENT
Start: 2017-07-07 | End: 2017-07-07

## 2017-07-07 RX ORDER — IBUPROFEN 400 MG/1
800 TABLET ORAL 3 TIMES DAILY
Status: DISCONTINUED | OUTPATIENT
Start: 2017-07-07 | End: 2017-07-08 | Stop reason: HOSPADM

## 2017-07-07 RX ORDER — ACETAMINOPHEN 10 MG/ML
1000 INJECTION, SOLUTION INTRAVENOUS ONCE
Status: COMPLETED | OUTPATIENT
Start: 2017-07-07 | End: 2017-07-07

## 2017-07-07 RX ORDER — HYDROMORPHONE HYDROCHLORIDE 1 MG/ML
0.2 INJECTION, SOLUTION INTRAMUSCULAR; INTRAVENOUS; SUBCUTANEOUS EVERY 5 MIN PRN
Status: DISCONTINUED | OUTPATIENT
Start: 2017-07-07 | End: 2017-07-08 | Stop reason: HOSPADM

## 2017-07-07 RX ORDER — CIPROFLOXACIN 2 MG/ML
400 INJECTION, SOLUTION INTRAVENOUS
Status: DISCONTINUED | OUTPATIENT
Start: 2017-07-07 | End: 2017-07-08

## 2017-07-07 RX ORDER — HYDROMORPHONE HYDROCHLORIDE 1 MG/ML
0.2 INJECTION, SOLUTION INTRAMUSCULAR; INTRAVENOUS; SUBCUTANEOUS EVERY 5 MIN PRN
Status: DISCONTINUED | OUTPATIENT
Start: 2017-07-07 | End: 2017-07-07 | Stop reason: HOSPADM

## 2017-07-07 RX ORDER — FENTANYL CITRATE 50 UG/ML
INJECTION, SOLUTION INTRAMUSCULAR; INTRAVENOUS
Status: DISCONTINUED | OUTPATIENT
Start: 2017-07-07 | End: 2017-07-07

## 2017-07-07 RX ORDER — LOSARTAN POTASSIUM 25 MG/1
25 TABLET ORAL DAILY
Status: DISCONTINUED | OUTPATIENT
Start: 2017-07-08 | End: 2017-07-07

## 2017-07-07 RX ORDER — HYDRALAZINE HYDROCHLORIDE 20 MG/ML
INJECTION INTRAMUSCULAR; INTRAVENOUS
Status: DISPENSED
Start: 2017-07-07 | End: 2017-07-08

## 2017-07-07 RX ORDER — SODIUM CHLORIDE 9 MG/ML
INJECTION, SOLUTION INTRAVENOUS CONTINUOUS
Status: DISCONTINUED | OUTPATIENT
Start: 2017-07-07 | End: 2017-07-07

## 2017-07-07 RX ORDER — HYDRALAZINE HYDROCHLORIDE 20 MG/ML
10 INJECTION INTRAMUSCULAR; INTRAVENOUS ONCE
Status: COMPLETED | OUTPATIENT
Start: 2017-07-07 | End: 2017-07-07

## 2017-07-07 RX ORDER — KETOROLAC TROMETHAMINE 15 MG/ML
30 INJECTION, SOLUTION INTRAMUSCULAR; INTRAVENOUS ONCE
Status: COMPLETED | OUTPATIENT
Start: 2017-07-07 | End: 2017-07-07

## 2017-07-07 RX ORDER — ENOXAPARIN SODIUM 100 MG/ML
40 INJECTION SUBCUTANEOUS
Status: DISCONTINUED | OUTPATIENT
Start: 2017-07-08 | End: 2017-07-08 | Stop reason: HOSPADM

## 2017-07-07 RX ORDER — METRONIDAZOLE 500 MG/100ML
500 INJECTION, SOLUTION INTRAVENOUS
Status: DISCONTINUED | OUTPATIENT
Start: 2017-07-07 | End: 2017-07-08

## 2017-07-07 RX ORDER — ONDANSETRON 8 MG/1
8 TABLET, ORALLY DISINTEGRATING ORAL EVERY 8 HOURS PRN
Status: DISCONTINUED | OUTPATIENT
Start: 2017-07-07 | End: 2017-07-08 | Stop reason: HOSPADM

## 2017-07-07 RX ORDER — LIDOCAINE HYDROCHLORIDE 10 MG/ML
1 INJECTION, SOLUTION EPIDURAL; INFILTRATION; INTRACAUDAL; PERINEURAL ONCE
Status: COMPLETED | OUTPATIENT
Start: 2017-07-07 | End: 2017-07-07

## 2017-07-07 RX ORDER — SODIUM CHLORIDE 9 MG/ML
INJECTION, SOLUTION INTRAVENOUS CONTINUOUS
Status: DISCONTINUED | OUTPATIENT
Start: 2017-07-07 | End: 2017-07-08 | Stop reason: HOSPADM

## 2017-07-07 RX ORDER — LIDOCAINE HYDROCHLORIDE 10 MG/ML
INJECTION, SOLUTION INTRAVENOUS
Status: DISCONTINUED | OUTPATIENT
Start: 2017-07-07 | End: 2017-07-07

## 2017-07-07 RX ORDER — ONDANSETRON 2 MG/ML
INJECTION INTRAMUSCULAR; INTRAVENOUS
Status: DISCONTINUED | OUTPATIENT
Start: 2017-07-07 | End: 2017-07-07

## 2017-07-07 RX ORDER — PROPOFOL 10 MG/ML
VIAL (ML) INTRAVENOUS
Status: DISCONTINUED | OUTPATIENT
Start: 2017-07-07 | End: 2017-07-07

## 2017-07-07 RX ORDER — SODIUM CHLORIDE 0.9 % (FLUSH) 0.9 %
3 SYRINGE (ML) INJECTION EVERY 8 HOURS
Status: DISCONTINUED | OUTPATIENT
Start: 2017-07-07 | End: 2017-07-08 | Stop reason: HOSPADM

## 2017-07-07 RX ADMIN — METOCLOPRAMIDE 10 MG: 5 INJECTION, SOLUTION INTRAMUSCULAR; INTRAVENOUS at 07:07

## 2017-07-07 RX ADMIN — METRONIDAZOLE 500 MG: 500 INJECTION, SOLUTION INTRAVENOUS at 07:07

## 2017-07-07 RX ADMIN — ROCURONIUM BROMIDE 10 MG: 10 INJECTION, SOLUTION INTRAVENOUS at 04:07

## 2017-07-07 RX ADMIN — LIDOCAINE HYDROCHLORIDE 100 MG: 10 INJECTION, SOLUTION INTRAVENOUS at 04:07

## 2017-07-07 RX ADMIN — LIDOCAINE HYDROCHLORIDE 20 MG: 10 INJECTION, SOLUTION EPIDURAL; INFILTRATION; INTRACAUDAL; PERINEURAL at 02:07

## 2017-07-07 RX ADMIN — HYDROMORPHONE HYDROCHLORIDE 0.2 MG: 1 INJECTION, SOLUTION INTRAMUSCULAR; INTRAVENOUS; SUBCUTANEOUS at 06:07

## 2017-07-07 RX ADMIN — HYDROMORPHONE HYDROCHLORIDE 0.2 MG: 1 INJECTION, SOLUTION INTRAMUSCULAR; INTRAVENOUS; SUBCUTANEOUS at 05:07

## 2017-07-07 RX ADMIN — SODIUM CHLORIDE: 0.9 INJECTION, SOLUTION INTRAVENOUS at 07:07

## 2017-07-07 RX ADMIN — FENTANYL CITRATE 50 MCG: 50 INJECTION, SOLUTION INTRAMUSCULAR; INTRAVENOUS at 04:07

## 2017-07-07 RX ADMIN — IBUPROFEN 800 MG: 200 TABLET, FILM COATED ORAL at 09:07

## 2017-07-07 RX ADMIN — SUCCINYLCHOLINE CHLORIDE 120 MG: 20 INJECTION, SOLUTION INTRAMUSCULAR; INTRAVENOUS at 04:07

## 2017-07-07 RX ADMIN — CIPROFLOXACIN 400 MG: 2 INJECTION, SOLUTION INTRAVENOUS at 07:07

## 2017-07-07 RX ADMIN — HYDRALAZINE HYDROCHLORIDE 10 MG: 20 INJECTION INTRAMUSCULAR; INTRAVENOUS at 09:07

## 2017-07-07 RX ADMIN — LOSARTAN POTASSIUM 25 MG: 25 TABLET, FILM COATED ORAL at 09:07

## 2017-07-07 RX ADMIN — ONDANSETRON 8 MG: 8 TABLET, ORALLY DISINTEGRATING ORAL at 08:07

## 2017-07-07 RX ADMIN — SODIUM CHLORIDE: 0.9 INJECTION, SOLUTION INTRAVENOUS at 02:07

## 2017-07-07 RX ADMIN — OXYCODONE HYDROCHLORIDE AND ACETAMINOPHEN 2 TABLET: 5; 325 TABLET ORAL at 05:07

## 2017-07-07 RX ADMIN — ONDANSETRON 4 MG: 2 INJECTION INTRAMUSCULAR; INTRAVENOUS at 05:07

## 2017-07-07 RX ADMIN — OXYCODONE HYDROCHLORIDE AND ACETAMINOPHEN 2 TABLET: 5; 325 TABLET ORAL at 11:07

## 2017-07-07 RX ADMIN — HYDRALAZINE HYDROCHLORIDE 10 MG: 20 INJECTION INTRAMUSCULAR; INTRAVENOUS at 08:07

## 2017-07-07 RX ADMIN — KETOROLAC TROMETHAMINE 30 MG: 15 INJECTION, SOLUTION INTRAMUSCULAR; INTRAVENOUS at 03:07

## 2017-07-07 RX ADMIN — ACETAMINOPHEN 1000 MG: 10 INJECTION, SOLUTION INTRAVENOUS at 03:07

## 2017-07-07 RX ADMIN — PROPOFOL 180 MG: 10 INJECTION, EMULSION INTRAVENOUS at 04:07

## 2017-07-07 RX ADMIN — SODIUM CHLORIDE: 0.9 INJECTION, SOLUTION INTRAVENOUS at 04:07

## 2017-07-07 RX ADMIN — CARVEDILOL 25 MG: 12.5 TABLET, FILM COATED ORAL at 07:07

## 2017-07-07 NOTE — OP NOTE
DATE OF PROCEDURE: 7/7/2017     PREOPERATIVE DIAGNOSES:   PeriRectal abscess [K61.1]    POSTOPERATIVE DIAGNOSES:   PeriRectal abscess [K61.1]    PROCEDURES PERFORMED:   Procedure(s) (LRB):  EXAM UNDER ANESTHESIA (N/A)  INCISION AND DRAINAGE (I & D)-BUTTOCK; drain placement     Surgeon(s) and Role:     * Juaquin Vu MD - Primary  Maude Weinstein md -resident    Indication: Mr. Waldrop is a 71-year-old male who had previously undergone anal fistulotomy in drainage of an abscess began to develop recurrent swelling and pain and drainage.   On inspection was found to have  A recurrent abscess necessitating operative drainage.    ANESTHESIA: General      DESCRIPTION OF PROCEDURE:   ppatient was taken to the operating room placed the operating table in a prone jackknife position under general anesthesia.  Buttock was effaced.  Perineum was prepped and draped in a sterile manner.  Inspection of the anal canal was found that the old fistulotomy site was healing nicely.  However in the left anterior lateral location, site of previous abscess there was a large fluctuant collection.  This was opened sent for culture.  The cavity extended towards the scrotum  However did not involve the testicles or the scrotum no internal opening could be seen a Penrose drain was placed from the anal canal towards the base of the abscess cavity.  The cavity was copiously irrigated. Hemostasis was assured.  Sponge, as correct.  The patient how the procedure was transported recovery room in stable condition      ESTIMATED BLOOD LOSS: 25    SPECIMENS:   Specimen (12h ago through future)    None      I was present and scrubbed for the entire procedure  COMPLICATIONS: no

## 2017-07-07 NOTE — PROGRESS NOTES
CRS Post-operative visit    Visit Info:     Surgery Date: 6/21/2017     Procedure(s) (LRB):  fISTULOTOMY  DRAIN OF ABSCESS CAVITY  EXAM UNDER ANESTHESIA (N/A)     Anesthesia: General     Description of Procedure:   FISTULOTOMY   DRAINAGE OF ABCESS CAVITY    Current Status:Increasin pain and swelling and drainage  Physical Exam:  General: White male in NAD sitting in chair in clinic  Neuro: aaox4 maex4 perrl  Respiratory: resps even unlabored  Cardiac: cap refill <2 sec  Abdomen: Normal, benign.   Reccurent abscess     Assessment and Plan:  reccurent abscess    Rectal abscess  -     Case Request Operating Room: EXAM UNDER ANESTHESIA    To Or for drainage

## 2017-07-07 NOTE — ANESTHESIA PREPROCEDURE EVALUATION
07/07/2017  Aguilar Waldrop is a 71 y.o., male.    Anesthesia Evaluation    I have reviewed the Patient Summary Reports.    I have reviewed the Nursing Notes.   I have reviewed the Medications.     Review of Systems  Anesthesia Hx:  No problems with previous Anesthesia  History of prior surgery of interest to airway management or planning: Previous anesthesia: General  Denies Personal Hx of Anesthesia complications.   Cardiovascular:   Hypertension    Pulmonary:  Pulmonary Normal    Renal/:  Renal/ Normal     Hepatic/GI:  Hepatic/GI Normal    Neurological:  Neurology Normal    Endocrine:   Diabetes, type 2      Patient Active Problem List   Diagnosis    Stress incontinence, male    Fistula-in-ano    Anal fistula    Rectal abscess     Past Medical History:   Diagnosis Date    Allergy     Diabetes mellitus     Hypertension     Prostate cancer     Urinary leakage      Past Surgical History:   Procedure Laterality Date    HERNIA REPAIR      x3    JOINT REPLACEMENT Left     partial    PROSTATECTOMY      radiation treatment           Physical Exam  General:  Well nourished    Airway/Jaw/Neck:  Airway Findings: Mouth Opening: Normal Tongue: Normal  General Airway Assessment: Adult  Mallampati: II  TM Distance: Normal, at least 6 cm  Jaw/Neck Findings:  Neck ROM: Normal ROM      Dental:  Dental Findings: In tact   Chest/Lungs:  Chest/Lungs Findings: Clear to auscultation     Heart/Vascular:  Heart Findings: Rate: Normal  Rhythm: Regular Rhythm  Sounds: Normal        Mental Status:  Mental Status Findings:  Cooperative, Alert and Oriented         Anesthesia Plan  Type of Anesthesia, risks & benefits discussed:  Anesthesia Type:  general  Patient's Preference: general  Intra-op Monitoring Plan: standard ASA monitors  Intra-op Monitoring Plan Comments:   Post Op Pain Control Plan:   Post Op Pain  Control Plan Comments:   Induction:   IV  Beta Blocker:  Patient is on a Beta-Blocker and has received one dose within the past 24 hours (No further documentation required).       Informed Consent: Patient understands risks and agrees with Anesthesia plan.  Questions answered. Anesthesia consent signed with patient.  ASA Score: 2     Day of Surgery Review of History & Physical:    H&P update referred to the surgeon.         Ready For Surgery From Anesthesia Perspective.

## 2017-07-07 NOTE — BRIEF OP NOTE
[facility]  Colon and Rectal Surgery  Operative Note    SUMMARY     Date of Procedure: 7/7/2017     Procedure: Procedure(s) (LRB):  EXAM UNDER ANESTHESIA (N/A)     Surgeon(s) and Role:     * Juaquin Vu MD - Primary  Liebe- resident    Assisting Surgeon: None    Pre-Operative Diagnosis: Rectal abscess [K61.1]    Post-Operative Diagnosis: Post-Op Diagnosis Codes:     * Rectal abscess [K61.1]    Anesthesia: General       Description of the Findings of the Procedure: I and D of abscess , placemetn of penrose            Estimated Blood Loss (EBL): 15           Implants: * No implants in log *    Specimens:   Specimen (12h ago through future)    None           Condition: Stable    Disposition: PACU - hemodynamically stable.    Attestation: I was present and scrubbed for the entire procedure.

## 2017-07-07 NOTE — PROGRESS NOTES
Dr. Roberts notified patient's BG 65 post operatively. Advised OK to give juice once patient is awake. Will carry out orders and continue to monitor.

## 2017-07-07 NOTE — PROGRESS NOTES
Patient now states pain is tolerable at a 4-5/10. BP remains elevated at 180/82. Dr. Ibanez states that he will respond to bedside shortly. Will continue to monitor.

## 2017-07-07 NOTE — INTERVAL H&P NOTE
The patient has been examined and the H&P has been reviewed:    I concur with the findings and no changes have occurred since H&P was written.    Anesthesia/Surgery risks, benefits and alternative options discussed and understood by patient/family.          Active Hospital Problems    Diagnosis  POA    Rectal abscess [K61.1]  Yes      Resolved Hospital Problems    Diagnosis Date Resolved POA   No resolved problems to display.     Have seen and examined the patient with the fellow and agree with their plan.  GAEL FITCH

## 2017-07-07 NOTE — TRANSFER OF CARE
"Anesthesia Transfer of Care Note    Patient: Aguilar Waldrop    Procedure(s) Performed: Procedure(s) (LRB):  EXAM UNDER ANESTHESIA (N/A)    Patient location: PACU    Anesthesia Type: general    Transport from OR: Transported from OR on room air with adequate spontaneous ventilation. Transported from OR on 6-10 L/min O2 by face mask with adequate spontaneous ventilation    Post pain: adequate analgesia    Post assessment: no apparent anesthetic complications and tolerated procedure well    Post vital signs: stable    Level of consciousness: awake and alert    Nausea/Vomiting: no nausea/vomiting    Complications: none    Transfer of care protocol was followed      Last vitals:   Visit Vitals  BP (!) 165/69   Pulse (!) 57   Temp 36.6 °C (97.8 °F)   Resp 18   Ht 5' 11" (1.803 m)   Wt 71.2 kg (157 lb)   SpO2 100%   BMI 21.90 kg/m²     "

## 2017-07-08 VITALS
HEIGHT: 71 IN | DIASTOLIC BLOOD PRESSURE: 69 MMHG | HEART RATE: 51 BPM | OXYGEN SATURATION: 99 % | RESPIRATION RATE: 16 BRPM | SYSTOLIC BLOOD PRESSURE: 153 MMHG | WEIGHT: 161 LBS | TEMPERATURE: 98 F | BODY MASS INDEX: 22.54 KG/M2

## 2017-07-08 LAB
ANION GAP SERPL CALC-SCNC: 10 MMOL/L
BASOPHILS # BLD AUTO: 0.03 K/UL
BASOPHILS NFR BLD: 0.3 %
BUN SERPL-MCNC: 20 MG/DL
CALCIUM SERPL-MCNC: 8.9 MG/DL
CHLORIDE SERPL-SCNC: 104 MMOL/L
CO2 SERPL-SCNC: 23 MMOL/L
CREAT SERPL-MCNC: 1.1 MG/DL
DIFFERENTIAL METHOD: ABNORMAL
EOSINOPHIL # BLD AUTO: 0.1 K/UL
EOSINOPHIL NFR BLD: 0.9 %
ERYTHROCYTE [DISTWIDTH] IN BLOOD BY AUTOMATED COUNT: 12.8 %
EST. GFR  (AFRICAN AMERICAN): >60 ML/MIN/1.73 M^2
EST. GFR  (NON AFRICAN AMERICAN): >60 ML/MIN/1.73 M^2
GLUCOSE SERPL-MCNC: 117 MG/DL
HCT VFR BLD AUTO: 33.4 %
HGB BLD-MCNC: 10.8 G/DL
LYMPHOCYTES # BLD AUTO: 1.5 K/UL
LYMPHOCYTES NFR BLD: 17.7 %
MCH RBC QN AUTO: 29.6 PG
MCHC RBC AUTO-ENTMCNC: 32.3 %
MCV RBC AUTO: 92 FL
MONOCYTES # BLD AUTO: 0.8 K/UL
MONOCYTES NFR BLD: 9.7 %
NEUTROPHILS # BLD AUTO: 6.1 K/UL
NEUTROPHILS NFR BLD: 71.3 %
PLATELET # BLD AUTO: 357 K/UL
PMV BLD AUTO: 8.6 FL
POTASSIUM SERPL-SCNC: 4.6 MMOL/L
RBC # BLD AUTO: 3.65 M/UL
SODIUM SERPL-SCNC: 137 MMOL/L
WBC # BLD AUTO: 8.58 K/UL

## 2017-07-08 PROCEDURE — 63600175 PHARM REV CODE 636 W HCPCS: Performed by: STUDENT IN AN ORGANIZED HEALTH CARE EDUCATION/TRAINING PROGRAM

## 2017-07-08 PROCEDURE — 36415 COLL VENOUS BLD VENIPUNCTURE: CPT

## 2017-07-08 PROCEDURE — 25000003 PHARM REV CODE 250: Performed by: ANESTHESIOLOGY

## 2017-07-08 PROCEDURE — 25000003 PHARM REV CODE 250: Performed by: STUDENT IN AN ORGANIZED HEALTH CARE EDUCATION/TRAINING PROGRAM

## 2017-07-08 PROCEDURE — 85025 COMPLETE CBC W/AUTO DIFF WBC: CPT

## 2017-07-08 PROCEDURE — 80048 BASIC METABOLIC PNL TOTAL CA: CPT

## 2017-07-08 RX ORDER — CIPROFLOXACIN 500 MG/1
500 TABLET ORAL 2 TIMES DAILY
Qty: 14 TABLET | Refills: 0 | Status: SHIPPED | OUTPATIENT
Start: 2017-07-08 | End: 2017-07-15

## 2017-07-08 RX ORDER — CIPROFLOXACIN 250 MG/1
500 TABLET, FILM COATED ORAL 2 TIMES DAILY
Status: DISCONTINUED | OUTPATIENT
Start: 2017-07-08 | End: 2017-07-08 | Stop reason: HOSPADM

## 2017-07-08 RX ORDER — METRONIDAZOLE 500 MG/1
500 TABLET ORAL EVERY 8 HOURS
Qty: 21 TABLET | Refills: 0 | Status: SHIPPED | OUTPATIENT
Start: 2017-07-08 | End: 2017-07-15

## 2017-07-08 RX ORDER — METRONIDAZOLE 500 MG/1
500 TABLET ORAL EVERY 8 HOURS
Status: DISCONTINUED | OUTPATIENT
Start: 2017-07-08 | End: 2017-07-08 | Stop reason: HOSPADM

## 2017-07-08 RX ORDER — OXYCODONE AND ACETAMINOPHEN 5; 325 MG/1; MG/1
1-2 TABLET ORAL EVERY 4 HOURS PRN
Qty: 41 TABLET | Refills: 0 | Status: SHIPPED | OUTPATIENT
Start: 2017-07-08 | End: 2017-08-15

## 2017-07-08 RX ADMIN — LOSARTAN POTASSIUM 25 MG: 25 TABLET, FILM COATED ORAL at 08:07

## 2017-07-08 RX ADMIN — SODIUM CHLORIDE, PRESERVATIVE FREE 3 ML: 5 INJECTION INTRAVENOUS at 02:07

## 2017-07-08 RX ADMIN — CARVEDILOL 25 MG: 12.5 TABLET, FILM COATED ORAL at 08:07

## 2017-07-08 RX ADMIN — IBUPROFEN 800 MG: 200 TABLET, FILM COATED ORAL at 02:07

## 2017-07-08 RX ADMIN — OXYCODONE HYDROCHLORIDE AND ACETAMINOPHEN 1 TABLET: 5; 325 TABLET ORAL at 03:07

## 2017-07-08 RX ADMIN — METRONIDAZOLE 500 MG: 500 INJECTION, SOLUTION INTRAVENOUS at 10:07

## 2017-07-08 RX ADMIN — CIPROFLOXACIN 400 MG: 2 INJECTION, SOLUTION INTRAVENOUS at 08:07

## 2017-07-08 RX ADMIN — ATORVASTATIN CALCIUM 20 MG: 10 TABLET, FILM COATED ORAL at 10:07

## 2017-07-08 RX ADMIN — METRONIDAZOLE 500 MG: 500 INJECTION, SOLUTION INTRAVENOUS at 02:07

## 2017-07-08 RX ADMIN — IBUPROFEN 800 MG: 200 TABLET, FILM COATED ORAL at 06:07

## 2017-07-08 NOTE — PLAN OF CARE
Problem: Patient Care Overview  Goal: Plan of Care Review  Outcome: Ongoing (interventions implemented as appropriate)  VSS. Patient states pain is tolerable. No N&V. Teds/SCD's in place throughout duration in PACU. Transferred to the next Phase of Care.

## 2017-07-08 NOTE — ANESTHESIA POSTPROCEDURE EVALUATION
"Anesthesia Post Evaluation    Patient: Aguilar Waldrop    Procedure(s) Performed: Procedure(s) (LRB):  EXAM UNDER ANESTHESIA (N/A)  INCISION AND DRAINAGE (I & D)-BUTTOCK; drain placement     Final Anesthesia Type: general  Patient location during evaluation: PACU  Patient participation: Yes- Able to Participate  Level of consciousness: awake and alert and oriented  Post-procedure vital signs: reviewed and stable  Pain management: adequate  Airway patency: patent  PONV status at discharge: nausea (controlled)  Anesthetic complications: no      Cardiovascular status: blood pressure returned to baseline and hemodynamically stable  Respiratory status: unassisted, spontaneous ventilation and room air  Hydration status: euvolemic  Follow-up not needed.        Visit Vitals  BP (!) 174/77   Pulse (!) 58   Temp 36.4 °C (97.6 °F) (Oral)   Resp 11   Ht 5' 11" (1.803 m)   Wt 71.2 kg (157 lb)   SpO2 100%   BMI 21.90 kg/m²       Pain/Quiana Score: Pain Assessment Performed: Yes (7/7/2017  7:26 PM)  Presence of Pain: denies (7/7/2017  7:26 PM)  Pain Rating Prior to Med Admin: 7 (7/7/2017  6:20 PM)  Pain Rating Post Med Admin: 0 (7/7/2017  7:26 PM)  Quiana Score: 10 (7/7/2017  7:26 PM)      "

## 2017-07-08 NOTE — PROGRESS NOTES
Dr. Ibanez at bedside. Advised to administer ordered carvedilol for blood pressure control. No additional orders given at this time. Will continue to monitor patient status.

## 2017-07-08 NOTE — ANESTHESIA RELEASE NOTE
"Anesthesia Release from PACU Note    Patient: Aguilar Waldrop    Procedure(s) Performed: Procedure(s) (LRB):  EXAM UNDER ANESTHESIA (N/A)  INCISION AND DRAINAGE (I & D)-BUTTOCK; drain placement     Anesthesia type: general    Post pain: Adequate analgesia    Post assessment: no apparent anesthetic complications, tolerated procedure well and no evidence of recall    Last Vitals:   Visit Vitals  BP (!) 174/77   Pulse (!) 58   Temp 36.4 °C (97.6 °F) (Oral)   Resp 11   Ht 5' 11" (1.803 m)   Wt 71.2 kg (157 lb)   SpO2 100%   BMI 21.90 kg/m²       Post vital signs: stable    Level of consciousness: awake, alert  and oriented    Nausea/Vomiting: nausea and no vomiting    Complications: none    Airway Patency: patent    Respiratory: unassisted, spontaneous ventilation, room air    Cardiovascular: stable and blood pressure at baseline    Hydration: euvolemic  "

## 2017-07-08 NOTE — NURSING
Paged Surgical intern again to get a status on patient's discharge.  Patient and family are upset and frustrated.

## 2017-07-08 NOTE — NURSING
Preparing patient for discharge. Instructions include F/U appointments, no baths until after F/U, activity restrictions, S/S of infection/complications, new prescriptions, and when to seek medical attention.  Patient and spouse verbalized understanding.  Patient requested pain medication before ambulating down to family vehicle.  Wheelchair refused, with charge RN's approval.  Peripheral IV removed.

## 2017-07-08 NOTE — NURSING
Pt is AAOx3.Pt is ambulatory and independent.Pt able to perform all ADL's without assistance. Family at bedside. NAD noted.Pt is in good spirits.  Standard precautions maintained.  No breakdown noted, po fluids encouraged.Pt turns independently, pt is aware of bony area and pressure reduction positions Pt remains injury and fall free, non skid footwear donned, call light within reach, personal items within reach, bed in low/locked position, pt able to voice needs all needs voiced have been met at this time.

## 2017-07-08 NOTE — NURSING
"Spoke to Surgical intern Juaquin with General surgery.  MD stated he "wanted to make sure he was working on the right patient" and that he is still figuring out which antibiotics with which to send patient home.  Reiterated that patient and their family are frustrated.  Still awaiting new orders at this time.   "

## 2017-07-08 NOTE — NURSING
"Paged Colorectal to update patient on is potential discharge.  MD answering the page instructed me to page the "Intern on the floor" and could give me no instruction.  Paged surgical intern for clarification.  Awaiting call back.  "

## 2017-07-08 NOTE — NURSING TRANSFER
Nursing Transfer Note      7/7/2017     Transfer To: 1048 from PACU    Transfer via stretcher    Transfer with IV pole, personal belongings and walking cane    Transported by RN    Medicines sent: NS infusing    Chart send with patient: Yes    Notified: spouse    Patient reassessed at: 7/7/17 23:00

## 2017-07-09 NOTE — DISCHARGE SUMMARY
[facility]  Colorectal Surgery  Discharge Summary      Patient Name: Aguilar Waldrop  MRN: [id number]  Admission Date: 7/7/2017  Hospital Length of Stay: 1 days  Discharge Date and Time: 7/8/2017  4:15 PM  Attending Physician: No att. providers found   Discharging Provider: Maude Weinstein MD  Primary Care Provider: Leonel Holland MD     HPI: Patient is a 71 y.o. male who presents with a one month history of drainage near his anus.  He reported intiially having sever pain x 5 days and spontaneous drainage there after from his anus.  He reports the drainage occurs daily and is worse with bowel movements.  He states his bowel do not cause pain.  He had a CSP 10 years prior and is due for one this August.  He denies any fevers or chills.  He reports a similar episode 20 years prior that spontaneously resolved.     Procedure(s) (LRB):  EXAM UNDER ANESTHESIA (N/A)  INCISION AND DRAINAGE (I & D)-BUTTOCK; drain placement      Hospital Course: Aguilar Waldrop underwent the above procedure on 7/7/2017 as treatment for rectal abscess. He tolerated the procedure well and his post-op course was uncomplicated. Prior to discharge home on 7/8/2017  his pain was well controlled on oral medications, tolerated diet, ambulated, spontaneously voided and experienced return of bowel function. He was discharged home in good condition on POD#1.          Significant Diagnostic Studies: Labs:   BMP:   Recent Labs  Lab 07/08/17  0525   *      K 4.6      CO2 23   BUN 20   CREATININE 1.1   CALCIUM 8.9    and CBC   Recent Labs  Lab 07/08/17  0525   WBC 8.58   HGB 10.8*   HCT 33.4*   *       Pending Diagnostic Studies:     None        Final Active Diagnoses:    Diagnosis Date Noted POA    Rectal abscess [K61.1] 07/07/2017 Yes      Problems Resolved During this Admission:    Diagnosis Date Noted Date Resolved POA      Discharged Condition: good    Disposition: Home or Self Care    Follow  Up:  Follow-up Information     Juaquin Vu MD. Go on 7/20/2017.    Specialty:  Colon and Rectal Surgery  Why:  For wound re-check  Contact information:  [address]X  [facility] [id number] [phone]                 Patient Instructions:     Diet general     Activity as tolerated     Shower on day dressing removed (No bath)     Lifting restrictions   Order Comments: Nothing greater than 10Lbs for 6 weeks     Other restrictions (specify):   Order Comments: Do not drive on narcotic pain medications until reaction times are back to a safe level.   Limit strenuous activity such as raking or pushing/pulling heavy objects     Call MD for:  temperature >100.4     Call MD for:  persistent nausea and vomiting or diarrhea     Call MD for:  severe uncontrolled pain     Call MD for:  difficulty breathing or increased cough     Leave dressing on - Keep it clean, dry, and intact until clinic visit       Medications:  Reconciled Home Medications:   Discharge Medication List as of 7/8/2017  3:35 PM      START taking these medications    Details   ciprofloxacin HCl (CIPRO) 500 MG tablet Take 1 tablet (500 mg total) by mouth 2 (two) times daily., Starting Sat 7/8/2017, Until Sat 7/15/2017, Normal      metronidazole (FLAGYL) 500 MG tablet Take 1 tablet (500 mg total) by mouth every 8 (eight) hours., Starting Sat 7/8/2017, Until Sat 7/15/2017, Normal         CONTINUE these medications which have CHANGED    Details   oxycodone-acetaminophen (PERCOCET) 5-325 mg per tablet Take 1-2 tablets by mouth every 4 (four) hours as needed for Pain., Starting Sat 7/8/2017, Print         CONTINUE these medications which have NOT CHANGED    Details   atorvastatin (LIPITOR) 20 MG tablet Take 20 mg by mouth once daily. , Starting 10/21/2015, Until Discontinued, Historical Med      carvedilol (COREG) 25 MG tablet Take 25 mg by mouth 2 (two) times daily with meals., Until Discontinued, Historical Med      glimepiride (AMARYL) 1 MG tablet  Take 1 mg by mouth 2 (two) times daily. , Starting 10/10/2015, Until Discontinued, Historical Med      loratadine (CLARITIN) 10 mg tablet Take 10 mg by mouth once daily., Until Discontinued, Historical Med      losartan (COZAAR) 25 MG tablet Take 25 mg by mouth once daily., Until Discontinued, Historical Med      metformin (GLUCOPHAGE) 1000 MG tablet Take 1,000 mg by mouth 2 (two) times daily with meals. , Starting 10/21/2015, Until Discontinued, Historical Med      multivitamin capsule Take 1 capsule by mouth once daily., Until Discontinued, Historical Med         STOP taking these medications       acetaminophen (TYLENOL) 325 MG tablet Comments:   Reason for Stopping:         ibuprofen (ADVIL,MOTRIN) 800 MG tablet Comments:   Reason for Stopping:             Maude Weinstein MD  PGY-1 Colorectal Surgery  [facility]    Have seen and examined the patient with the fellow and agree with their plan.  GAEL FITCH

## 2017-07-10 LAB — BACTERIA SPEC AEROBE CULT: NORMAL

## 2017-07-11 LAB — BACTERIA SPEC ANAEROBE CULT: NORMAL

## 2017-07-18 ENCOUNTER — OFFICE VISIT (OUTPATIENT)
Dept: SURGERY | Facility: CLINIC | Age: 72
End: 2017-07-18
Payer: MEDICARE

## 2017-07-18 VITALS
DIASTOLIC BLOOD PRESSURE: 62 MMHG | WEIGHT: 152.13 LBS | SYSTOLIC BLOOD PRESSURE: 110 MMHG | HEART RATE: 78 BPM | HEIGHT: 71 IN | BODY MASS INDEX: 21.3 KG/M2

## 2017-07-18 DIAGNOSIS — L02.91 ABSCESS: ICD-10-CM

## 2017-07-18 DIAGNOSIS — Z98.890 POSTOPERATIVE STATE: Primary | ICD-10-CM

## 2017-07-18 PROCEDURE — 99024 POSTOP FOLLOW-UP VISIT: CPT | Mod: S$GLB,,, | Performed by: COLON & RECTAL SURGERY

## 2017-07-18 PROCEDURE — 99999 PR PBB SHADOW E&M-EST. PATIENT-LVL III: CPT | Mod: PBBFAC,,, | Performed by: COLON & RECTAL SURGERY

## 2017-07-18 NOTE — PROGRESS NOTES
CRS Post-operative visit    Visit Info:         PREOPERATIVE DIAGNOSES:   PeriRectal abscess [K61.1]     POSTOPERATIVE DIAGNOSES:   PeriRectal abscess [K61.1]     PROCEDURES PERFORMED:   Procedure(s) (LRB):  EXAM UNDER ANESTHESIA (N/A)  INCISION AND DRAINAGE (I & D)-BUTTOCK; drain placement         Indication: Mr. Waldrop is a 71-year-old male who had previously undergone anal fistulotomy in drainage of an abscess began to develop recurrent swelling and pain and drainage.   On inspection was found to have  A recurrent abscess necessitating operative drainage.    Current Status: Doing well postoperatively.no fevers no chills the drain is more comfortable than the last one.  But still causing some perineal discomfort      Physical Exam:  General: White male in NAD sitting in chair in clinic  Neuro: aaox4 maex4 perrl  Respiratory: resps even unlabored  Cardiac: cap refill <2 sec  Abdomen: Normal, benign. Incisions:clean, dry, intact   Anorectal:  Drain in place no erythema and no induration minimal purulent drainage  Assessment and Plan:  Aguilar was seen today for follow-up.    Diagnoses and all orders for this visit:    Postoperative state    Abscess  -     MRI Pelvis W WO Contrast; Future    may increase diet and activity, still minimize lifting  RTC 4 weeks

## 2017-07-24 ENCOUNTER — TELEPHONE (OUTPATIENT)
Dept: SURGERY | Facility: CLINIC | Age: 72
End: 2017-07-24

## 2017-07-24 NOTE — TELEPHONE ENCOUNTER
----- Message from Mary Gracia sent at 7/19/2017  9:46 AM CDT -----  Contact: pt#[phone]  Pt wants to speak with you about ways to get stronger and diet

## 2017-07-24 NOTE — TELEPHONE ENCOUNTER
Spoke with patient. States is feeling much stronger and has no issues or complaints. Requested sedation for MRI. Informed .

## 2017-08-08 ENCOUNTER — TELEPHONE (OUTPATIENT)
Dept: SURGERY | Facility: CLINIC | Age: 72
End: 2017-08-08

## 2017-08-08 RX ORDER — DIAZEPAM 2 MG/1
2 TABLET ORAL EVERY 12 HOURS PRN
Qty: 3 TABLET | Refills: 0 | Status: SHIPPED | OUTPATIENT
Start: 2017-08-08 | End: 2017-10-31

## 2017-08-08 NOTE — TELEPHONE ENCOUNTER
Medication ordered. Instructed to have someone drive him to here and home while taking this medication.     ----- Message from Leana Moreno RN sent at 8/7/2017  5:35 PM CDT -----  Contact: Pt# [phone]  Can you please prescribe something for this patient for his claustrophobia-MRI scheduled 8/14.  Thanks,  Leana   ----- Message -----  From: Charity Lisa  Sent: 8/7/2017  11:45 AM  To: Horace GIL Staff    Pt states he have an Appt 8/14 for MRI and was calling to speak to the nurse about he's medication to take before his MRI.

## 2017-08-14 ENCOUNTER — HOSPITAL ENCOUNTER (OUTPATIENT)
Dept: RADIOLOGY | Facility: HOSPITAL | Age: 72
Discharge: HOME | End: 2017-08-14
Attending: COLON & RECTAL SURGERY
Payer: MEDICARE

## 2017-08-14 DIAGNOSIS — L02.91 ABSCESS: ICD-10-CM

## 2017-08-14 PROCEDURE — 72197 MRI PELVIS W/O & W/DYE: CPT | Mod: TC

## 2017-08-14 PROCEDURE — 72197 MRI PELVIS W/O & W/DYE: CPT | Mod: 26,,, | Performed by: RADIOLOGY

## 2017-08-14 PROCEDURE — 25500020 PHARM REV CODE 255: Performed by: COLON & RECTAL SURGERY

## 2017-08-14 PROCEDURE — A9585 GADOBUTROL INJECTION: HCPCS | Performed by: COLON & RECTAL SURGERY

## 2017-08-14 RX ORDER — GADOBUTROL 604.72 MG/ML
7 INJECTION INTRAVENOUS
Status: COMPLETED | OUTPATIENT
Start: 2017-08-14 | End: 2017-08-14

## 2017-08-14 RX ADMIN — GADOBUTROL 7 ML: 604.72 INJECTION INTRAVENOUS at 12:08

## 2017-08-15 ENCOUNTER — OFFICE VISIT (OUTPATIENT)
Dept: SURGERY | Facility: CLINIC | Age: 72
End: 2017-08-15
Payer: MEDICARE

## 2017-08-15 VITALS
SYSTOLIC BLOOD PRESSURE: 193 MMHG | HEART RATE: 65 BPM | HEIGHT: 71 IN | BODY MASS INDEX: 21.91 KG/M2 | WEIGHT: 156.5 LBS | DIASTOLIC BLOOD PRESSURE: 86 MMHG

## 2017-08-15 DIAGNOSIS — Z98.890 POSTOPERATIVE STATE: Primary | ICD-10-CM

## 2017-08-15 PROCEDURE — 99024 POSTOP FOLLOW-UP VISIT: CPT | Mod: S$GLB,,, | Performed by: COLON & RECTAL SURGERY

## 2017-08-15 PROCEDURE — 99999 PR PBB SHADOW E&M-EST. PATIENT-LVL III: CPT | Mod: PBBFAC,,, | Performed by: COLON & RECTAL SURGERY

## 2017-08-15 NOTE — PROGRESS NOTES
CRS Post-operative visit    Visit Info:         PREOPERATIVE DIAGNOSES:   PeriRectal abscess [K61.1]     POSTOPERATIVE DIAGNOSES:   PeriRectal abscess [K61.1]     PROCEDURES PERFORMED:   Procedure(s) (LRB):  EXAM UNDER ANESTHESIA (N/A)  INCISION AND DRAINAGE (I & D)-BUTTOCK; drain placement         Indication: Mr. Waldrop is a 71-year-old male who had previously undergone anal fistulotomy in drainage of an abscess began to develop recurrent swelling and pain and drainage.   On inspection was found to have  A recurrent abscess necessitating operative drainage.    Current Status: Doing well postoperatively.no fevers no chills the drain is more comfortable than the last one.     Physical Exam:  General: White male in NAD sitting in chair in clinic  Neuro: aaox4 maex4 perrl  Respiratory: resps even unlabored  Cardiac: cap refill <2 sec  Abdomen: Normal, benign. Incisions:clean, dry, intact   Anorectal:  Drain in place no erythema and no induration minimal purulent drainage  Assessment and Plan:  Diagnoses and all orders for this visit:    Postoperative state    Drain removed   RTC 3 weeks

## 2017-09-05 ENCOUNTER — OFFICE VISIT (OUTPATIENT)
Dept: SURGERY | Facility: CLINIC | Age: 72
End: 2017-09-05
Payer: MEDICARE

## 2017-09-05 VITALS
WEIGHT: 159.38 LBS | DIASTOLIC BLOOD PRESSURE: 90 MMHG | HEIGHT: 71 IN | HEART RATE: 61 BPM | BODY MASS INDEX: 22.31 KG/M2 | SYSTOLIC BLOOD PRESSURE: 174 MMHG

## 2017-09-05 DIAGNOSIS — Z98.890 POSTOPERATIVE STATE: Primary | ICD-10-CM

## 2017-09-05 PROCEDURE — 99024 POSTOP FOLLOW-UP VISIT: CPT | Mod: S$GLB,,, | Performed by: COLON & RECTAL SURGERY

## 2017-09-05 PROCEDURE — 99999 PR PBB SHADOW E&M-EST. PATIENT-LVL III: CPT | Mod: PBBFAC,,, | Performed by: COLON & RECTAL SURGERY

## 2017-09-05 NOTE — PROGRESS NOTES
CRS Post-operative visit    Visit Info:         PREOPERATIVE DIAGNOSES:   PeriRectal abscess [K61.1]     POSTOPERATIVE DIAGNOSES:   PeriRectal abscess [K61.1]     PROCEDURES PERFORMED:   Procedure(s) (LRB):  EXAM UNDER ANESTHESIA (N/A)  INCISION AND DRAINAGE (I & D)-BUTTOCK; drain placement         Indication: Mr. Waldrop is a 71-year-old male who had previously undergone anal fistulotomy in drainage of an abscess began to develop recurrent swelling and pain and drainage.   On inspection was found to have  A recurrent abscess necessitating operative drainage.    Current Status: Doing well postoperatively.no fevers no chills the drain is more comfortable than the last one.     Physical Exam:  General: White male in NAD sitting in chair in clinic  Neuro: aaox4 maex4 perrl  Respiratory: resps even unlabored  Cardiac: cap refill <2 sec  Abdomen: Normal, benign. Incisions:clean, dry, intact   Anorectal:  no erythema and no induration minimal purulent drainage  Assessment and Plan:  There are no diagnoses linked to this encounter.Drain removed   RTC4 weeks

## 2017-10-03 ENCOUNTER — OFFICE VISIT (OUTPATIENT)
Dept: SURGERY | Facility: CLINIC | Age: 72
End: 2017-10-03
Payer: MEDICARE

## 2017-10-03 VITALS
SYSTOLIC BLOOD PRESSURE: 169 MMHG | BODY MASS INDEX: 22.25 KG/M2 | HEIGHT: 71 IN | HEART RATE: 59 BPM | DIASTOLIC BLOOD PRESSURE: 78 MMHG | WEIGHT: 158.94 LBS

## 2017-10-03 DIAGNOSIS — L98.8 FISTULA: Primary | ICD-10-CM

## 2017-10-03 PROCEDURE — 99024 POSTOP FOLLOW-UP VISIT: CPT | Mod: S$GLB,,, | Performed by: COLON & RECTAL SURGERY

## 2017-10-03 PROCEDURE — 99999 PR PBB SHADOW E&M-EST. PATIENT-LVL III: CPT | Mod: PBBFAC,,, | Performed by: COLON & RECTAL SURGERY

## 2017-10-03 NOTE — PROGRESS NOTES
CRS Post-operative visit    Visit Info:         PREOPERATIVE DIAGNOSES:   PeriRectal abscess [K61.1]     POSTOPERATIVE DIAGNOSES:   PeriRectal abscess [K61.1]     PROCEDURES PERFORMED:   Procedure(s) (LRB):  EXAM UNDER ANESTHESIA (N/A)  INCISION AND DRAINAGE (I & D)-BUTTOCK; drain placement         Indication: Mr. Waldrop is a 71-year-old male who had previously undergone anal fistulotomy in drainage of an abscess began to develop recurrent swelling and pain and drainage.   On inspection was found to have  A recurrent abscess necessitating operative drainage.    Current Status: Doing well postoperatively.no fevers no chills the drainage has stopped to a minimum.  He is having no pain   Physical Exam:  General: White male in NAD sitting in chair in clinic  Neuro: aaox4 maex4 perrl  Respiratory: resps even unlabored  Cardiac: cap refill <2 sec  Abdomen: Normal, benign. Incisions:clean, dry, intact   Anorectal:  no erythema and no induration minimal purulent drainage  Assessment and Plan:  He was seen for follow-up the wound is continuing to heal.  He will return in 1 month if completely healed would consider MRI to make sure everything is completely drained

## 2017-10-26 ENCOUNTER — TELEPHONE (OUTPATIENT)
Dept: UROLOGY | Facility: CLINIC | Age: 72
End: 2017-10-26

## 2017-10-26 NOTE — TELEPHONE ENCOUNTER
----- Message from Maya Friedman sent at 10/26/2017  2:15 PM CDT -----  Contact: pt  _  1st Request  _  2nd Request  _  3rd Request        Who:pt     Why: Requesting a call back in regards to his labs he had done all ready. Please call pt    What Number to Call Back:[phone]    When to Expect a call back: (Within 24 hours)    Please return the call at earliest convenience. Thanks!

## 2017-10-26 NOTE — TELEPHONE ENCOUNTER
----- Message from Raymond Small sent at 10/26/2017  2:06 PM CDT -----  Contact: Aguilar Waldrop  X_  1st Request  _  2nd Request  _  3rd Request        Who: Aguilar Waldrop    Why: Patient called stating that 8:15 appt time is fine and he will be there at 8:15 am    What Number to Call Back: [phone]    When to Expect a call back: (Within 24 hours)    Please return the call at earliest convenience. Thanks!

## 2017-10-31 ENCOUNTER — OFFICE VISIT (OUTPATIENT)
Dept: SURGERY | Facility: CLINIC | Age: 72
End: 2017-10-31
Payer: MEDICARE

## 2017-10-31 VITALS
BODY MASS INDEX: 22.93 KG/M2 | SYSTOLIC BLOOD PRESSURE: 181 MMHG | WEIGHT: 163.81 LBS | DIASTOLIC BLOOD PRESSURE: 78 MMHG | HEIGHT: 71 IN | HEART RATE: 58 BPM

## 2017-10-31 DIAGNOSIS — Z98.890 POSTOPERATIVE STATE: Primary | ICD-10-CM

## 2017-10-31 PROCEDURE — 99999 PR PBB SHADOW E&M-EST. PATIENT-LVL III: CPT | Mod: PBBFAC,,, | Performed by: COLON & RECTAL SURGERY

## 2017-10-31 PROCEDURE — 99024 POSTOP FOLLOW-UP VISIT: CPT | Mod: S$GLB,,, | Performed by: COLON & RECTAL SURGERY

## 2017-10-31 NOTE — PROGRESS NOTES
CRS Post-operative visit    Visit Info:         PREOPERATIVE DIAGNOSES:   PeriRectal abscess [K61.1]     POSTOPERATIVE DIAGNOSES:   PeriRectal abscess [K61.1]     PROCEDURES PERFORMED:   Procedure(s) (LRB):  EXAM UNDER ANESTHESIA (N/A)  INCISION AND DRAINAGE (I & D)-BUTTOCK; drain placement         Indication: Mr. Waldrop is a 71-year-old male who had previously undergone anal fistulotomy in drainage of an abscess began to develop recurrent swelling and pain and drainage.   On inspection was found to have  A recurrent abscess necessitating operative drainage.    Current Status: Doing well postoperatively.no fevers no chills the drainage has stopped to a minimum.  He is having no pain   Physical Exam:  General: White male in NAD sitting in chair in clinic  Neuro: aaox4 maex4 perrl  Respiratory: resps even unlabored  Cardiac: cap refill <2 sec  Abdomen: Normal, benign. Incisions:clean, dry, intact   Anorectal:  no erythema and no induration wound at 2mmAssessment and Plan:  He was seen for follow-up the wound is continuing to heal.    RTC prn

## 2017-11-02 ENCOUNTER — TELEPHONE (OUTPATIENT)
Dept: UROLOGY | Facility: CLINIC | Age: 72
End: 2017-11-02

## 2017-11-02 NOTE — TELEPHONE ENCOUNTER
----- Message from Dafne Gore sent at 11/2/2017 12:06 PM CDT -----  Contact: JUANA SAHU [[id number]]  x_  1st Request  _  2nd Request  _  3rd Request        Who: JUANA SAHU [[id number]]  Why: Requesting a call back in regards to a call from the office . Please call him back.     What Number to Call Back: [phone]    When to Expect a call back: (Within 24 hours)    Please return the call at earliest convenience. Thanks!

## 2017-11-03 ENCOUNTER — OFFICE VISIT (OUTPATIENT)
Dept: UROLOGY | Facility: CLINIC | Age: 72
End: 2017-11-03
Attending: UROLOGY
Payer: MEDICARE

## 2017-11-03 VITALS
BODY MASS INDEX: 22.93 KG/M2 | SYSTOLIC BLOOD PRESSURE: 163 MMHG | DIASTOLIC BLOOD PRESSURE: 76 MMHG | HEIGHT: 71 IN | WEIGHT: 163.81 LBS | HEART RATE: 59 BPM

## 2017-11-03 DIAGNOSIS — N39.3 URINARY STRESS INCONTINENCE, MALE: ICD-10-CM

## 2017-11-03 DIAGNOSIS — C61 PROSTATE CANCER: Primary | ICD-10-CM

## 2017-11-03 PROCEDURE — 99214 OFFICE O/P EST MOD 30 MIN: CPT | Mod: S$GLB,,, | Performed by: UROLOGY

## 2017-11-03 RX ORDER — CARVEDILOL 3.12 MG/1
TABLET ORAL
COMMUNITY
Start: 2017-10-14 | End: 2019-04-15 | Stop reason: SDUPTHER

## 2017-11-03 RX ORDER — MENTHOL 5.8 MG/1
LOZENGE ORAL
COMMUNITY
Start: 2017-10-14 | End: 2019-04-15 | Stop reason: SDUPTHER

## 2017-11-03 NOTE — PROGRESS NOTES
"Subjective:      Aguilar Waldrop is a 71 y.o. male who returns today regarding his     .  Robotic prostatectomy many years ago.  He's not having erections but he and his wife are not interested in pursuing treatment    His urinary incontinence was improving with pelvic floor physical therapy.  However he recently had 3 for perianal fistula.  His thousand he had a wound which resolved relatively slow to heal.  He's been having significant discomfort and therefore has been having trouble doing his Keagle exercises.  His urinary incontinence has worsened some during this period    The following portions of the patient's history were reviewed and updated as appropriate: allergies, current medications, past family history, past medical history, past social history, past surgical history and problem list.    Review of Systems  Pertinent items are noted in HPI.  A comprehensive multipoint review of systems was negative except as otherwise stated in the HPI.     Objective:   Vitals: BP (!) 163/76 (BP Location: Left arm, Patient Position: Sitting, BP Method: Large (Automatic))   Pulse (!) 59   Ht 5' 11" (1.803 m)   Wt 74.3 kg (163 lb 12.8 oz)   BMI 22.85 kg/m²     Physical Exam   General: alert and oriented, no acute distress  Respiratory: Symmetric expansion, non-labored breathing  Cardiovascular: no peripheral edema  Abdomen:  non distended  Skin: normal coloration and turgor, no rashes, no suspicious skin lesions noted  Neuro: no gross deficits  Psych: normal judgment and insight, normal mood/affect and non-anxious    Physical Exam    Lab Review   Urinalysis demonstrates no specimen    psa less than 0.1    Lab Results   Component Value Date    WBC 8.58 07/08/2017    HGB 10.8 (L) 07/08/2017    HCT 33.4 (L) 07/08/2017    MCV 92 07/08/2017     (H) 07/08/2017     Lab Results   Component Value Date    CREATININE 1.1 07/08/2017    BUN 20 07/08/2017       Imaging  -  Assessment and Plan:   Prostate cancer " luisana 7 [id number] MARY KATE sp surgery and early salvage XRT  -     Prostate Specific Antigen, Diagnostic; Future; Expected date: 11/01/2018  rtc 1 yr with psa    Urinary stress incontinence, male  -     Ambulatory Referral to Physical/Occupational Therapy  We will refer him back to physical therapy for pelvic floor rehabilitation.  I've asked him to wait to he is a having less anal pain before he starts this.

## 2017-11-30 ENCOUNTER — TELEPHONE (OUTPATIENT)
Dept: SURGERY | Facility: CLINIC | Age: 72
End: 2017-11-30

## 2017-11-30 NOTE — TELEPHONE ENCOUNTER
Pt had swelling in area of previous fistula. Over night it opened and drained. Appt made for him to see Dr Vu next week.

## 2017-11-30 NOTE — TELEPHONE ENCOUNTER
----- Message from Linda Lindsey sent at 11/30/2017  1:59 PM CST -----  Contact: Pt:[phone]  Pt called and states he would like to speak with Nurse Leana in regards to a wound that he had that was healing fine. Pt states the wound is starting to reopen and he is having some bleeding. Pt has some concerns about the wound.

## 2017-12-07 ENCOUNTER — OFFICE VISIT (OUTPATIENT)
Dept: SURGERY | Facility: CLINIC | Age: 72
End: 2017-12-07
Payer: MEDICARE

## 2017-12-07 VITALS
SYSTOLIC BLOOD PRESSURE: 168 MMHG | DIASTOLIC BLOOD PRESSURE: 89 MMHG | BODY MASS INDEX: 24.17 KG/M2 | WEIGHT: 173.31 LBS | HEART RATE: 58 BPM

## 2017-12-07 DIAGNOSIS — R19.8 ANAL DISCHARGE: ICD-10-CM

## 2017-12-07 PROCEDURE — 99213 OFFICE O/P EST LOW 20 MIN: CPT | Mod: S$GLB,,, | Performed by: COLON & RECTAL SURGERY

## 2017-12-07 PROCEDURE — 99999 PR PBB SHADOW E&M-EST. PATIENT-LVL III: CPT | Mod: PBBFAC,,, | Performed by: COLON & RECTAL SURGERY

## 2017-12-07 NOTE — PROGRESS NOTES
CRS Office Visit    SUBJECTIVE:     Chief Complaint: Perianal drainage    History of Present Illness:  Patient is a 72 y.o. male with history of perianal abscesses and fistulae s/p EUA with drainage most recently in July 2017. Has a history of prostate cancer s/p radiation. He was doing well and about a week ago had a repeated sensation of perianal fullness followed by serosang drainage. This has improved over the past few days, but he is concerned for a fistula. Otherwise doing well, tolerating diet, gaining weight, good control of bowel movements.    Review of patient's allergies indicates:  No Known Allergies    Past Medical History:   Diagnosis Date    Allergy     Diabetes mellitus     Hypertension     Prostate cancer     Urinary leakage      Past Surgical History:   Procedure Laterality Date    HERNIA REPAIR      x3    JOINT REPLACEMENT Left     partial    PROSTATECTOMY      radiation treatment       Family History   Problem Relation Age of Onset    Cancer Father     Cancer Mother     Anesthesia problems Neg Hx      Social History   Substance Use Topics    Smoking status: Never Smoker    Smokeless tobacco: Never Used    Alcohol use No       OBJECTIVE:     Vital Signs (Most Recent)  Pulse: (!) 58 (12/07/17 0930)  BP: (!) 168/89 (12/07/17 0930)    Physical Exam:  General: White male in NAD sitting in chair in clinic  Neuro: aaox4 maex4 perrl  Respiratory: resps even unlabored  Cardiac: cap refill <2 sec  Extremities: Warm dry and intact  Anorectal: small pinhole in the left anterior aspect of the perianal region. No erythema or induration, some scant clear drainage.    ASSESSMENT/PLAN:     Aguilar was seen today for rectal pain.    Diagnoses and all orders for this visit:    Anal discharge      Abscess cavity healing well, only superficial portion is still open. Discussed with patient that his skin likely closed over this healing portion and accumulated fluid resulting in drainage. Will continue to  fill in from the bottom and this may happen again. Advised sitz baths as needed.  Have seen and examined the patient with the fellow and agree with their plan.  GAEL FITCH

## 2018-09-27 ENCOUNTER — TELEPHONE (OUTPATIENT)
Dept: UROLOGY | Facility: CLINIC | Age: 73
End: 2018-09-27

## 2018-09-27 NOTE — TELEPHONE ENCOUNTER
----- Message from Mary Richard sent at 9/27/2018  2:42 PM CDT -----  Contact: Pt   Name of Who is Calling: JUANA SAHU [[id number]]      What is the request in detail: Patient requesting his lab work orders be sent to [facility] in [place] before his scheduled appointment on 11/6. Please contact to further discuss and advise      Can the clinic reply by [facility]: No       What Number to Call Back [facility] not [facility]: [phone]

## 2018-10-23 LAB — PSA SERPL-MCNC: <0.1 NG/ML (ref 0–4)

## 2018-10-24 NOTE — PROGRESS NOTES
Please tell Mr. Aguilar Waldrop that Dr Giordano reviewed these results, and he will discuss the results in person at the next appointment.  If he does not have an appointment, please schedule this.

## 2018-11-06 ENCOUNTER — OFFICE VISIT (OUTPATIENT)
Dept: UROLOGY | Facility: CLINIC | Age: 73
End: 2018-11-06
Payer: MEDICARE

## 2018-11-06 VITALS
BODY MASS INDEX: 25.06 KG/M2 | SYSTOLIC BLOOD PRESSURE: 195 MMHG | HEIGHT: 71 IN | WEIGHT: 179 LBS | HEART RATE: 62 BPM | DIASTOLIC BLOOD PRESSURE: 82 MMHG

## 2018-11-06 DIAGNOSIS — C61 PROSTATE CANCER: ICD-10-CM

## 2018-11-06 DIAGNOSIS — K40.91 UNILATERAL RECURRENT INGUINAL HERNIA WITHOUT OBSTRUCTION OR GANGRENE: Primary | ICD-10-CM

## 2018-11-06 DIAGNOSIS — N39.3 URINARY STRESS INCONTINENCE, MALE: ICD-10-CM

## 2018-11-06 LAB
BILIRUB SERPL-MCNC: ABNORMAL MG/DL
BLOOD URINE, POC: ABNORMAL
COLOR, POC UA: ABNORMAL
GLUCOSE UR QL STRIP: ABNORMAL
KETONES UR QL STRIP: ABNORMAL
LEUKOCYTE ESTERASE URINE, POC: ABNORMAL
NITRITE, POC UA: ABNORMAL
PH, POC UA: 5
PROTEIN, POC: 30
SPECIFIC GRAVITY, POC UA: 1.01
UROBILINOGEN, POC UA: ABNORMAL

## 2018-11-06 PROCEDURE — 1101F PT FALLS ASSESS-DOCD LE1/YR: CPT | Mod: CPTII,S$GLB,, | Performed by: UROLOGY

## 2018-11-06 PROCEDURE — 99214 OFFICE O/P EST MOD 30 MIN: CPT | Mod: 25,S$GLB,, | Performed by: UROLOGY

## 2018-11-06 PROCEDURE — 81002 URINALYSIS NONAUTO W/O SCOPE: CPT | Mod: S$GLB,,, | Performed by: UROLOGY

## 2018-11-06 NOTE — PROGRESS NOTES
"Subjective:      Aguilar Waldrop is a 72 y.o. male who returns today regarding his     He has had a difficult year.  Arm he was doing with a perianal fistula which eventually healed he required packing and hyperbaric therapy for this.    He recently underwent right open inguinal hernia repair he is again feeling bulge in this area and is concerned that he may have recurrence.  No nausea no vomiting.  No symptoms of obstruction    The following portions of the patient's history were reviewed and updated as appropriate: allergies, current medications, past family history, past medical history, past social history, past surgical history and problem list.    Review of Systems  Pertinent items are noted in HPI.  A comprehensive multipoint review of systems was negative except as otherwise stated in the HPI.     Objective:   Vitals: BP (!) 195/82 (BP Location: Right arm, Patient Position: Sitting, BP Method: Large (Automatic))   Pulse 62   Ht 5' 11" (1.803 m)   Wt 81.2 kg (179 lb 0.2 oz)   BMI 24.97 kg/m²     Physical Exam   General: alert and oriented, no acute distress  Respiratory: Symmetric expansion, non-labored breathing  Cardiovascular: normal to inspection  Abdomen: non distended   Skin: normal coloration and turgor, no rashes, no suspicious skin lesions noted  Neuro: no gross deficits  Psych: normal judgment and insight, normal mood/affect and non-anxious    Physical Exam    Lab Review   Urinalysis demonstrates negative for all components  Lab Results   Component Value Date    WBC 8.58 07/08/2017    HGB 10.8 (L) 07/08/2017    HCT 33.4 (L) 07/08/2017    MCV 92 07/08/2017     (H) 07/08/2017     Lab Results   Component Value Date    CREATININE 1.1 07/08/2017    BUN 20 07/08/2017     No results found for: PSA, PSADIAG, PSATOTAL, PSAFREE, PSAFREEPCT  PSA less than 0.1  Imaging  -  Assessment and Plan:   Unilateral recurrent inguinal hernia without obstruction or gangrene  I instructed him to make a " follow-up appointment with Dr. Alfredo Pearce, his general surgeon      Prostate cancer MARY KATE  -     Prostate Specific Antigen, Diagnostic; Future; Expected date: 11/04/2019    Urinary stress incontinence, male  -     POCT URINE DIPSTICK WITHOUT MICROSCOPE  -     Ambulatory Referral to Physical/Occupational Therapy  Torsten noble    Return to clinic 1 year with PSA

## 2019-01-29 ENCOUNTER — CLINICAL SUPPORT (OUTPATIENT)
Dept: REHABILITATION | Facility: HOSPITAL | Age: 74
End: 2019-01-29
Attending: UROLOGY
Payer: MEDICARE

## 2019-01-29 DIAGNOSIS — N39.3 STRESS INCONTINENCE, MALE: Primary | ICD-10-CM

## 2019-01-29 PROCEDURE — 97162 PT EVAL MOD COMPLEX 30 MIN: CPT

## 2019-01-29 PROCEDURE — 97110 THERAPEUTIC EXERCISES: CPT

## 2019-01-29 NOTE — PLAN OF CARE
Patient: Aguilar Waldrop   [facility] #:  [id number]    Date of treatment: 01/29/2019   Time in: 9:01  Time out: 10:10    Aguilar is a 73 y.o. male evaluated on 1/29/2019    Physician:  Flako Giordano MD   Diagnosis:   Encounter Diagnosis   Name Primary?    Stress incontinence, male Yes        Treatment ordered: PT    Medical History:   Past Medical History:   Diagnosis Date    Allergy     Diabetes mellitus     Hypertension     Prostate cancer     Urinary leakage         Surgical History:   Past Surgical History:   Procedure Laterality Date    EXAM UNDER ANESTHESIA N/A 7/7/2017    [facility]    EXAM UNDER ANESTHESIA N/A 6/21/2017    Performed by [facility] at [facility] OR [address] [place] [address]    EXAM UNDER ANESTHESIA N/A 5/3/2017    Performed by [facility] at [facility] OR [address] [place] [address]    HERNIA REPAIR      x3    INCISION AND DRAINAGE (I & D)-BUTTOCK; drain placement   7/7/2017    Performed by [facility] at [facility] OR [address] [place] [address]    INCISION-ABCESS-PERIANAL  6/21/2017    Performed by [facility] at [facility] OR [address] [place] [address]    JOINT REPLACEMENT Left     partial    LIGATION-INTERSPHINCTERIC FISTULA TRACT N/A 6/21/2017    Performed by [facility] at [facility] OR [address] [place] [address]    PLACEMENT-SETON DRAIN N/A 5/3/2017    Performed by [facility] at [facility] OR [address] [place] [address]    PROSTATECTOMY      radiation treatment          Medications:   Current Outpatient Medications   Medication Sig    atorvastatin (LIPITOR) 20 MG tablet Take 20 mg by mouth once daily.     carvedilol (COREG) 3.125 MG tablet     glimepiride (AMARYL) 1 MG tablet Take 1 mg by mouth 2 (two) times daily.     loratadine (CLARITIN) 10 mg tablet Take 10 mg by mouth once daily.    losartan (COZAAR) 25 MG tablet Take 25 mg by mouth once daily.    metformin (GLUCOPHAGE) 1000 MG tablet Take 1,000 mg by mouth 2 (two) times daily with meals.     multivitamin capsule Take 1 capsule by mouth once daily.     VITAMIN B-12 100 MCG tablet      No current facility-administered medications for this visit.        Allergies: Review of patient's allergies indicates:  No Known Allergies   Precautions: universal    Bladder/Bowel History: blood in urine, dribbling after urination, trouble emptying bladder completely and urinary incontinence       SUBJECTIVE:   History of current complaint:   Pt reports a long history of hospitalizations and procedures for inguinal hernias, fistulas, etc.  He last had a procedure in December 2018 for an inguinal hernia repair with mesh.  He also reports periodic flares of perirectal pain after these procedures.  He has scant amounts of sanguinous drainage from the perianal fistula site.  BM's are not a problem- sitting aggravates his rectal pain more than BM's.  He is also having stress leakage with activity- more active= more leakage.  Wears 1-2 liners per day depending on activity.  He is dry at night.      Date of Onset: 6 years ago  Symptoms are: evolving    Frequency of Urination: Daytime: 3 times           Nighttime: 1-2    Urine Stream: strong    Frequency of Bowel Movements: once a day; (manages constipation with oregano oil and a stool softener)    Types of Fluid Intake: water, occ. Soft drink,  Debbie,  coffee 1 cup in am, occ wine      Bladder Leakage: Yes  Frequency of incidents: daily  Amount Leaked: drops    Colon Leakage: No    Form of Protection: pad  Number of Pads required in 24 hours: 1-2 (depends on activity)    Activities that cause symptoms:full bladder, standing after prolonged sitting    Current Exercise: none right now due to medical issues; works in the garden         Pain:  Patient reports 0/10 with 0 being the lowest and 10 being the highest (gets belly pain with coughing and exertion)      OBJECTIVE:  Patient received 55 minutes of treatment which consisted of evaluation and 10 minutes of ther ex.    ORTHO SCREEN  Posture: decreased lumbar lordosis      Pelvic  Alignment: no deviations noted in supine       ABDOMINALS  Scarring:  B LQ scars from hernia surgery- RLQ scars more painful to palpate; portal scars healed well.       Diastasis: none   Abdominal strength: rectus 2/5     Transverse: very minimal contraction palpated       RECTAL PELVIC FLOOR EXAM:    Excursion: WNL   Discharge: none    Skin Integrity:  Intact perianal skin; small opening noted on the R posterior scrotum- dried, scant amount of serosanguinous drainage noted.          External Clock:   Scarring:   Intact scar adjacent to the above opening of skin  Sensation: WNL    Pain: none  Anal [place]: intact     Internal exam deferred    SEMG Evaluation: performed in supine hooklying with external lead wires.  He demonstrated normal baseline resting, good WR rise, and good holding in 5 sec Kegels.  No pain noted after performing Kegels.      Treatment Given: instructed on general anatomy/physiology of urinary/bowel system; discussed plan of care with patient; instructed in benefits/risks of treatment; instructed in alternative methods of treatment; instructed in risks of refusing treatment; patient agreed to treatment plan; instructed in diaphragmatic breathing with abdominal set and Kegels per handout issued.  Pt verbalized understanding of all instruction.      ASSESSMENT:    Problem List:    poor knowledge of body mechanics and posture, adhered abdominal scar, poor trunk stability, decreased pelvic muscle strength, decreased endurance of the pelvic muscles, increased tension of the pelvic muscles, poor quality of pelvic muscle contraction, poor coordination of pelvic floor muscles during ADL's leading to urinary or fecal leakage and dysfunctional voiding    Barriers to Learning: none    Educational/Spiritual/Cultural needs:  none  Environmental Barriers: none noted  Abuse/Neglect: no signs  Nutritional Status: WDWN HM  Fall Risk: none    History  Co-morbidities and personal factors that may impact the plan of  care Examination  Body Structures and Functions, activity limitations and participation restrictions that may impact the plan of care    Clinical Presentation   Co-morbidities:   s/p prostate CA with radiation; s/o multiple hernia repairs with mesh; s/p anorectal fistula        Personal Factors:   no deficits Body Regions:   abdomen and pelvis    Body Systems:    strength  motor control  scar formation  core stability            Participation Restrictions:   Pt cannot perform ADL's without wearing a pad to catch urinary leakage.  Sleep interrupted by nocturia     Activity limitations:     Domestic Life  doing house work (cleaning house, washing dishes, laundry)    Interactions/Relationships  intimate relationships    Community and Social Life  community life         unstable clinical presentation with unpredictable characteristics                      moderate   moderate  moderate Decision Making/ Complexity Score:  moderate           FUNCTIONAL GOALS: 3 months  1. Patient able to participate in exercise with less leakage of urine.,   2. Patient able to perform basic ADL with less leaking.,   3. Patient able to perform advanced ADL (moving furniture, heavy lifting, gardening) with less leaking.,   4. Cough, sneeze, or laugh with less incontinence.,   5. Decreased need for pad use for incontinence. ,   6. Able to maintain normal breathing pattern during pelvic floor muscle contraction.,   7. Pt. to be I with techniques for double voiding.,   8. Pt to be able to engage the pelvic brace during ADL's to minimize pelvic discomfort and leakage.   9. Pt to be I with HEP.    PATIENTS GOALS: Patient reports that his/her primary goal for PT is to be able to perform ADL's without urinary leakage.      PLAN:  biofeedback and therapeutic exercises    Physical Therapy Education: bladder irritants, anatomy/physiology of pelvic floor, posture/body mechanices, diaphragmatic breathing and double voiding  techniques    Frequency/Duration: once per 2 weeks for 3 months

## 2019-02-12 ENCOUNTER — CLINICAL SUPPORT (OUTPATIENT)
Dept: REHABILITATION | Facility: HOSPITAL | Age: 74
End: 2019-02-12
Attending: UROLOGY
Payer: MEDICARE

## 2019-02-12 DIAGNOSIS — N39.3 STRESS INCONTINENCE, MALE: ICD-10-CM

## 2019-02-12 PROCEDURE — 97110 THERAPEUTIC EXERCISES: CPT

## 2019-02-12 NOTE — PATIENT INSTRUCTIONS
Home Program: 2019    Belly Breathing with abdominal set:  1. Lie down comfortably.  2. Take a breath in thru your nose, allowing your abdomen to gently rise.  3. Breathe out thru your mouth, and gently draw in your lower belly.    4. Repeat this for 5 minutes, 2 times per day.    Kegels instandin. Standing comfortably,  squeeze the muscles the stop the flow of urine and passage of gas.    2. Hold for 10 seconds without holding breath.  3. Release for 20 sec.    4. Repeat 10 times, 2 sets,  2 times per day.  (goal is 40 per day)    Quick Flicks: 10 one second contractions in a row with full release in between.  Several sets per day.      NO:  Sit ups  Planks  Anything that causes PAIN in the abdominal region.    Stop Kegels if any bleeding or drainage from the fistula worsens.

## 2019-02-12 NOTE — PROGRESS NOTES
OUTPATIENT PHYSICAL THERAPY DAILY NOTE       Patient: Aguilar Waldrop   [facility] #:  [id number]    Diagnosis:   Encounter Diagnosis   Name Primary?    Stress incontinence, male       Date of treatment: 02/12/2019     Time in: 9:48  Time out: 10:42  Billable time: 56 minutes  Visit #: 2    POC expiration: 4/29/2019    SUBJECTIVE   Pt reports: that his scrotal abscess/fistula drains from time to time.  Swells and becomes painful, then drains.  Today not draining.  Did exercises every day except one.    Pain:pt did not quantify but he is not sitting symmetrically on the chair due to discomfort.      OBJECTIVE     Therapeutic Exercise to develop  strength and endurance for 56 minutes including:   Kegels with assist of SEMG and diaphragmatic breathing with TA set, and quick flicks.  We worked in supine and standing with external lead wires.  He demonstrated normal baseline resting, good WR rise, and good holding in supine 5 sec Kegels.  We then looked at DB with TA set in standing- he was able to perform this appropriately with increased activity in TA and PF noted on SEMG at the appropriate times.  In standing his PF baseline resting activity was elevated, both in supported and unsupported standing.  His WR rise and holding in 10 sec Kegels were fair.  Derecruitment in this position was delayed.  Quick flicks were introduced here as well.  Please refer to pt. Instructions for revised home program.     Education: Discussed progression of plan of care with patient; educated pt in activity modification; reviewed HEP with pt. Pt demonstrated and verbalized understanding of all instruction and was provided with a handout of HEP (see Patient Instructions).      ASSESSMENT      Pt tolerated entire treatment well with no visible adverse effects. He reported no increase in pain with Kegels; no drainage noted in the perianal area with removal of electrodes.    Will the patient continue to benefit from skilled PT  intervention? Yes  Medical necessity demonstrated by: skilled PT supervision required for HEP and treatment progression.  Progress towards goals:  satisfactory  New/Revised goals: none      PLAN     Continue with established Plan of Care, working toward established PT goals.

## 2019-03-12 ENCOUNTER — CLINICAL SUPPORT (OUTPATIENT)
Dept: REHABILITATION | Facility: HOSPITAL | Age: 74
End: 2019-03-12
Attending: UROLOGY
Payer: MEDICARE

## 2019-03-12 DIAGNOSIS — N39.3 STRESS INCONTINENCE, MALE: ICD-10-CM

## 2019-03-12 PROCEDURE — 97110 THERAPEUTIC EXERCISES: CPT

## 2019-03-12 NOTE — PROGRESS NOTES
OUTPATIENT PHYSICAL THERAPY DAILY NOTE       Patient: Aguilar Waldrop   [facility] #:  [id number]    Diagnosis:   Encounter Diagnosis   Name Primary?    Stress incontinence, male       Date of treatment: 03/12/2019     Time in: 10:01  Time out: 10:50  Billable time: 45 minutes  Visit #: 3    POC expiration: 4/29/2019    SUBJECTIVE   Pt reports: that his scrotal abscess/fistula drains from time to time.  Swells and becomes painful, then drains.  Last flare lasted a week.   Did exercises every day except when his pain increased in last week's flare.  He states that the pain and the leakage is something that he will have to live with.        Pain:pt did not quantify but he is not sitting symmetrically on the chair due to discomfort.      OBJECTIVE     Therapeutic Exercise to develop  strength and endurance for 45 minutes including:   Kegels with assist of SEMG and diaphragmatic breathing with TA set, and quick flicks.  We worked in supine and standing with external lead wires.  He demonstrated normal baseline resting, good WR rise, and good holding in supine 10 sec Kegels.  (less WR rise than last session when doing 5 sec Kegels). We then looked at DB with TA set in standing- he was able to perform this appropriately with increased activity in TA and PF noted on SEMG at the appropriate times.  His average abdominal work in this activity was increased today.  He was cued to place his hand over his RLQ scars for support, and he noted that this made the exercise much more comfortable.  In standing his PF baseline resting activity was elevated, both in supported and unsupported standing.  His WR rise and holding in 10 sec Kegels were fair, decreased from last session.  Please refer to pt. Instructions for revised home program.     Education: Discussed progression of plan of care with patient; educated pt in activity modification; reviewed HEP with pt. Pt demonstrated and verbalized understanding of all instruction and  was provided with a handout of HEP (see Patient Instructions).      ASSESSMENT      Pt is I with his HEP- feel that progressing his activities beyond what he is currently doing would not be advisable.  No further need for PT services at this time.    Goals: 1. Patient able to participate in exercise with less leakage of urine.,   2. Patient able to perform basic ADL with less leaking.,   3. Patient able to perform advanced ADL (moving furniture, heavy lifting, gardening) with less leaking.,   4. Cough, sneeze, or laugh with less incontinence.,   5. Decreased need for pad use for incontinence. ,   6. Able to maintain normal breathing pattern during pelvic floor muscle contraction., MET  7. Pt. to be I with techniques for double voiding., MET  8. Pt to be able to engage the pelvic brace during ADL's to minimize pelvic discomfort and leakage. MET but limited by increased pain at times.    9. Pt to be I with HEP.  MET      PLAN     D/c PT

## 2019-03-12 NOTE — PATIENT INSTRUCTIONS
Home Program: 2019    Belly Breathing with abdominal set:  1. Lie down comfortably. Place your hand over your scars on the right side.  2. Take a breath in thru your nose, allowing your abdomen to gently rise.  3. Breathe out thru your mouth, and gently draw in your lower belly.    4. Repeat this for 5 minutes, 2 times per day.    Kegels in standin. Standing comfortably,  squeeze the muscles the stop the flow of urine and passage of gas.    2. Hold for 10 seconds without holding breath.  3. Release for 20 sec.    4. Repeat 10 times, 2 sets,  2 times per day.  (goal is 40 per day)    Quick Flicks: 10 one second contractions in a row with full release in between.  Several sets per day.      NO:  Sit ups  Planks  Anything that causes PAIN in the abdominal region.    Stop Kegels if any bleeding or drainage from the fistula worsens.  Do not let your pain level go above 4-5/10 when exercising.

## 2019-04-15 ENCOUNTER — OFFICE VISIT (OUTPATIENT)
Dept: SURGERY | Facility: CLINIC | Age: 74
End: 2019-04-15
Payer: MEDICARE

## 2019-04-15 VITALS
BODY MASS INDEX: 24.91 KG/M2 | HEIGHT: 71 IN | SYSTOLIC BLOOD PRESSURE: 154 MMHG | WEIGHT: 177.94 LBS | HEART RATE: 62 BPM | DIASTOLIC BLOOD PRESSURE: 74 MMHG

## 2019-04-15 DIAGNOSIS — R19.8 ANAL DISCHARGE: ICD-10-CM

## 2019-04-15 DIAGNOSIS — K60.3 FISTULA-IN-ANO: Primary | ICD-10-CM

## 2019-04-15 PROCEDURE — 99213 OFFICE O/P EST LOW 20 MIN: CPT | Mod: S$GLB,,, | Performed by: COLON & RECTAL SURGERY

## 2019-04-15 PROCEDURE — 99213 PR OFFICE/OUTPT VISIT, EST, LEVL III, 20-29 MIN: ICD-10-PCS | Mod: S$GLB,,, | Performed by: COLON & RECTAL SURGERY

## 2019-04-15 PROCEDURE — 1101F PR PT FALLS ASSESS DOC 0-1 FALLS W/OUT INJ PAST YR: ICD-10-PCS | Mod: CPTII,S$GLB,, | Performed by: COLON & RECTAL SURGERY

## 2019-04-15 PROCEDURE — 99999 PR PBB SHADOW E&M-EST. PATIENT-LVL III: ICD-10-PCS | Mod: PBBFAC,,, | Performed by: COLON & RECTAL SURGERY

## 2019-04-15 PROCEDURE — 1101F PT FALLS ASSESS-DOCD LE1/YR: CPT | Mod: CPTII,S$GLB,, | Performed by: COLON & RECTAL SURGERY

## 2019-04-15 PROCEDURE — 99999 PR PBB SHADOW E&M-EST. PATIENT-LVL III: CPT | Mod: PBBFAC,,, | Performed by: COLON & RECTAL SURGERY

## 2019-04-15 RX ORDER — LANOLIN ALCOHOL/MO/W.PET/CERES
100 CREAM (GRAM) TOPICAL
COMMUNITY
End: 2019-05-27 | Stop reason: SDUPTHER

## 2019-04-15 RX ORDER — ASPIRIN 81 MG/1
81 TABLET ORAL DAILY
COMMUNITY

## 2019-04-15 RX ORDER — MULTIVITAMIN
1 TABLET ORAL DAILY
COMMUNITY

## 2019-04-15 RX ORDER — LORATADINE 10 MG/1
10 TABLET ORAL DAILY
COMMUNITY
End: 2019-05-27 | Stop reason: SDUPTHER

## 2019-04-15 RX ORDER — METHYLPREDNISOLONE 4 MG/1
TABLET ORAL
COMMUNITY
End: 2019-05-06 | Stop reason: CLARIF

## 2019-04-15 RX ORDER — ATORVASTATIN CALCIUM 40 MG/1
40 TABLET, FILM COATED ORAL DAILY
COMMUNITY
Start: 2019-04-10 | End: 2020-01-27

## 2019-04-15 RX ORDER — GLIMEPIRIDE 1 MG/1
0.5 TABLET ORAL 2 TIMES DAILY
COMMUNITY
End: 2021-02-11 | Stop reason: SDUPTHER

## 2019-04-15 RX ORDER — NYSTATIN 100000 U/G
CREAM TOPICAL
COMMUNITY
Start: 2019-04-09 | End: 2019-07-01

## 2019-04-15 RX ORDER — LOSARTAN POTASSIUM 50 MG/1
50 TABLET ORAL DAILY
COMMUNITY
Start: 2019-03-04 | End: 2020-11-12

## 2019-04-15 RX ORDER — IBUPROFEN 100 MG/5ML
1000 SUSPENSION, ORAL (FINAL DOSE FORM) ORAL DAILY
COMMUNITY
End: 2021-08-17

## 2019-04-15 RX ORDER — LOSARTAN POTASSIUM 25 MG/1
25 TABLET ORAL
COMMUNITY
End: 2019-05-06 | Stop reason: CLARIF

## 2019-04-15 RX ORDER — METFORMIN HYDROCHLORIDE 1000 MG/1
1000 TABLET ORAL 2 TIMES DAILY WITH MEALS
COMMUNITY
End: 2021-02-11 | Stop reason: SDUPTHER

## 2019-04-15 RX ORDER — CARVEDILOL 3.12 MG/1
3.12 TABLET ORAL 2 TIMES DAILY
COMMUNITY
End: 2020-01-13 | Stop reason: SDUPTHER

## 2019-04-15 RX ORDER — VIT C/E/ZN/COPPR/LUTEIN/ZEAXAN 250MG-90MG
1000 CAPSULE ORAL
COMMUNITY
End: 2019-05-27 | Stop reason: SDUPTHER

## 2019-04-15 RX ORDER — SULFAMETHOXAZOLE AND TRIMETHOPRIM 800; 160 MG/1; MG/1
TABLET ORAL
COMMUNITY
Start: 2019-04-09 | End: 2019-05-06 | Stop reason: CLARIF

## 2019-04-15 NOTE — H&P (VIEW-ONLY)
CRS Office Visit History and Physical    Referring Md:   No referring provider defined for this encounter.    SUBJECTIVE:     Chief Complaint: perianal pain/drainage    History of Present Illness:  The patient is an established patient to this practice but new to Dr. Medrano.  Course is as follows:  Patient is a 73 y.o. male presents with recurrent perianal abscess. He was treated by Dr. Vu in 2017 with several EUAs and seton/drainage, most recently 7/2017. He was ultimately felt to be completed with his treatment but experienced another recurrence. He has since seen several other physicians and has undergone multiple courses of antibiotics, hyperbaric oxygen treatment, and local wound care. He typically will be ok for 2-3 weeks and then will have a return of drainage that usually lasts a day or two. This most recent episode has been going on for about a month. PMH significant for prostate cancer treated with radiotherapy/surgery and DM.    Review of patient's allergies indicates:  No Known Allergies    Past Medical History:   Diagnosis Date    Allergy     Diabetes mellitus     Hypertension     Prostate cancer     Urinary leakage      Past Surgical History:   Procedure Laterality Date    EXAM UNDER ANESTHESIA N/A 7/7/2017    [facility]    EXAM UNDER ANESTHESIA N/A 6/21/2017    [facility]    EXAM UNDER ANESTHESIA N/A 5/3/2017    [facility] by [facility] at [facility] OR [address] [place] [address]    HERNIA REPAIR      x3    INCISION AND DRAINAGE (I & D)-BUTTOCK; drain placement   7/7/2017    [facility]    INCISION-ABCESS-PERIANAL  6/21/2017    Performed by [facility] at [facility] OR [address] [place] [address]    JOINT REPLACEMENT Left     partial    LIGATION-INTERSPHINCTERIC FISTULA TRACT N/A 6/21/2017    Performed by [facility] at [facility] OR [address] [place] [address]    PLACEMENT-SETON DRAIN N/A 5/3/2017     "[facility]    PROSTATECTOMY      radiation treatment       Family History   Problem Relation Age of Onset    Cancer Father     Cancer Mother     Anesthesia problems Neg Hx      Social History     Tobacco Use    Smoking status: Never Smoker    Smokeless tobacco: Never Used   Substance Use Topics    Alcohol use: No    Drug use: No        Review of Systems:  Review of Systems   Constitutional: Negative for chills and fever.   Eyes: Negative for blurred vision and double vision.   Respiratory: Negative.    Gastrointestinal: Negative for abdominal pain, nausea and vomiting.        Perianal drainage   Musculoskeletal: Negative.    Skin: Negative for itching and rash.   Neurological: Negative for dizziness, tingling and tremors.       OBJECTIVE:     Vital Signs (Most Recent)  BP (!) 154/74 (BP Location: Left arm, Patient Position: Sitting, BP Method: Large (Automatic))   Pulse 62   Ht 5' 11" (1.803 m)   Wt 80.7 kg (177 lb 14.6 oz)   BMI 24.81 kg/m²     Physical Exam:  General: White male in no distress   Neuro: alert and oriented x 4.  Moves all extremities.     HEENT: no icterus.  Trachea midline  Respiratory: respirations are even and unlabored  Cardiac: regular rate  Abdomen: soft, nt, nd  Extremities: Warm dry and intact  Skin: no rashes  Anorectal:  perineal external fistula opening with localized erythema and tenderness. No fluctuance or induration appreciated    ASSESSMENT/PLAN:     Aguilar was seen today for anal fistula.    Diagnoses and all orders for this visit:    Fistula-in-ano    Anal discharge      MRI pelvis to better assess disease  Will likely plan for EUA following MRI and will likely need seton placement    Ronaldo Tuttle MD  Colon and Rectal Surgery Fellow     I have interviewed and examined the patient, reviewed the notes and assessments, and/or personally supervised the procedure(s) performed by Dr Tuttle, and I concur with her/his documentation " of Aguilar Waldrop.  See below addendum for my evaluation and additional findings.    72 yo M with recurrent anal fistula after having undergone multiple surgical procedures here at [facility] by Dr. Vu and at other facilities as well by other surgeons.  He is extremely frustrated with ongoing pain and drainage, says that it significantly impact his quality of life due to pain with prolonged sitting.    Exam as noted above. External fistula opening left anterolateral anal margin, without significant surrounding erythema/induration/crepitus/fluctuance.    I explained to the patient the complicated nature of treatment of anal fistulas and the high recurrence rate after treatment.  Given that he has a recurrent fistula following multiple surgical procedures, I would like to obtain an MRI of the pelvis to get a better grasp of the anatomy of the fistula.  We will likely schedule him for examination under anesthesia following the MRI.    Beau Medrano MD, FACS, FASCRS  Senior Staff Surgeon  Department of Colon & Rectal Surgery

## 2019-04-15 NOTE — PROGRESS NOTES
CRS Office Visit History and Physical    Referring Md:   No referring provider defined for this encounter.    SUBJECTIVE:     Chief Complaint: perianal pain/drainage    History of Present Illness:  The patient is an established patient to this practice but new to Dr. Medrano.  Course is as follows:  Patient is a 73 y.o. male presents with recurrent perianal abscess. He was treated by Dr. Vu in 2017 with several EUAs and seton/drainage, most recently 7/2017. He was ultimately felt to be completed with his treatment but experienced another recurrence. He has since seen several other physicians and has undergone multiple courses of antibiotics, hyperbaric oxygen treatment, and local wound care. He typically will be ok for 2-3 weeks and then will have a return of drainage that usually lasts a day or two. This most recent episode has been going on for about a month. PMH significant for prostate cancer treated with radiotherapy/surgery and DM.    Review of patient's allergies indicates:  No Known Allergies    Past Medical History:   Diagnosis Date    Allergy     Diabetes mellitus     Hypertension     Prostate cancer     Urinary leakage      Past Surgical History:   Procedure Laterality Date    EXAM UNDER ANESTHESIA N/A 7/7/2017    [facility]    EXAM UNDER ANESTHESIA N/A 6/21/2017    [facility]    EXAM UNDER ANESTHESIA N/A 5/3/2017    Per[facility] at [facility] OR [address] [place] [address]    HERNIA REPAIR      x3    INCISION AND DRAINAGE (I & D)-BUTTOCK; drain placement   7/7/2017    [facility]    INCISION-ABCESS-PERIANAL  6/21/2017    Performed by [facility] at [facility] OR [address] [place] [address]    JOINT REPLACEMENT Left     partial    LIGATION-INTERSPHINCTERIC FISTULA TRACT N/A 6/21/2017    Performed by [facility] at [facility] OR [address] [place] [address]    PLACEMENT-SETON DRAIN N/A 5/3/2017     "[facility]    PROSTATECTOMY      radiation treatment       Family History   Problem Relation Age of Onset    Cancer Father     Cancer Mother     Anesthesia problems Neg Hx      Social History     Tobacco Use    Smoking status: Never Smoker    Smokeless tobacco: Never Used   Substance Use Topics    Alcohol use: No    Drug use: No        Review of Systems:  Review of Systems   Constitutional: Negative for chills and fever.   Eyes: Negative for blurred vision and double vision.   Respiratory: Negative.    Gastrointestinal: Negative for abdominal pain, nausea and vomiting.        Perianal drainage   Musculoskeletal: Negative.    Skin: Negative for itching and rash.   Neurological: Negative for dizziness, tingling and tremors.       OBJECTIVE:     Vital Signs (Most Recent)  BP (!) 154/74 (BP Location: Left arm, Patient Position: Sitting, BP Method: Large (Automatic))   Pulse 62   Ht 5' 11" (1.803 m)   Wt 80.7 kg (177 lb 14.6 oz)   BMI 24.81 kg/m²     Physical Exam:  General: White male in no distress   Neuro: alert and oriented x 4.  Moves all extremities.     HEENT: no icterus.  Trachea midline  Respiratory: respirations are even and unlabored  Cardiac: regular rate  Abdomen: soft, nt, nd  Extremities: Warm dry and intact  Skin: no rashes  Anorectal:  perineal external fistula opening with localized erythema and tenderness. No fluctuance or induration appreciated    ASSESSMENT/PLAN:     Aguilar was seen today for anal fistula.    Diagnoses and all orders for this visit:    Fistula-in-ano    Anal discharge      MRI pelvis to better assess disease  Will likely plan for EUA following MRI and will likely need seton placement    Ronaldo Tuttle MD  Colon and Rectal Surgery Fellow     I have interviewed and examined the patient, reviewed the notes and assessments, and/or personally supervised the procedure(s) performed by Dr Tuttle, and I concur with her/his documentation " of Aguilar Waldrpo.  See below addendum for my evaluation and additional findings.    72 yo M with recurrent anal fistula after having undergone multiple surgical procedures here at [facility] by Dr. Vu and at other facilities as well by other surgeons.  He is extremely frustrated with ongoing pain and drainage, says that it significantly impact his quality of life due to pain with prolonged sitting.    Exam as noted above. External fistula opening left anterolateral anal margin, without significant surrounding erythema/induration/crepitus/fluctuance.    I explained to the patient the complicated nature of treatment of anal fistulas and the high recurrence rate after treatment.  Given that he has a recurrent fistula following multiple surgical procedures, I would like to obtain an MRI of the pelvis to get a better grasp of the anatomy of the fistula.  We will likely schedule him for examination under anesthesia following the MRI.    Beau Medrano MD, FACS, FASCRS  Senior Staff Surgeon  Department of Colon & Rectal Surgery

## 2019-04-24 ENCOUNTER — HOSPITAL ENCOUNTER (OUTPATIENT)
Dept: RADIOLOGY | Facility: HOSPITAL | Age: 74
Discharge: HOME | End: 2019-04-24
Attending: COLON & RECTAL SURGERY
Payer: MEDICARE

## 2019-04-24 DIAGNOSIS — K60.3 FISTULA-IN-ANO: ICD-10-CM

## 2019-04-24 DIAGNOSIS — R19.8 ANAL DISCHARGE: ICD-10-CM

## 2019-04-24 LAB
CREAT SERPL-MCNC: 1 MG/DL (ref 0.5–1.4)
SAMPLE: NORMAL

## 2019-04-24 PROCEDURE — A9585 GADOBUTROL INJECTION: HCPCS | Performed by: COLON & RECTAL SURGERY

## 2019-04-24 PROCEDURE — 72197 MRI PELVIS W WO CONTRAST: ICD-10-PCS | Mod: 26,,, | Performed by: RADIOLOGY

## 2019-04-24 PROCEDURE — 72197 MRI PELVIS W/O & W/DYE: CPT | Mod: 26,,, | Performed by: RADIOLOGY

## 2019-04-24 PROCEDURE — 25500020 PHARM REV CODE 255: Performed by: COLON & RECTAL SURGERY

## 2019-04-24 PROCEDURE — 72197 MRI PELVIS W/O & W/DYE: CPT | Mod: TC

## 2019-04-24 RX ORDER — GADOBUTROL 604.72 MG/ML
9 INJECTION INTRAVENOUS
Status: COMPLETED | OUTPATIENT
Start: 2019-04-24 | End: 2019-04-24

## 2019-04-24 RX ADMIN — GADOBUTROL 65 ML: 604.72 INJECTION INTRAVENOUS at 02:04

## 2019-05-01 ENCOUNTER — TELEPHONE (OUTPATIENT)
Dept: SURGERY | Facility: CLINIC | Age: 74
End: 2019-05-01

## 2019-05-01 NOTE — TELEPHONE ENCOUNTER
----- Message from Beau Medrano MD sent at 5/1/2019  3:09 PM CDT -----  Contact: Pt:[phone]  He has a fistula that does not appear complex, no associated abscess.  Needs an EUA & seton placement - I can do it next Weds or Fri if he wants.  If he wants to come in Monday to discuss I can see him then, or if he just wants to pick a day I can discuss it with him and get consent on day of surgery.    ----- Message -----  From: Lynda Carroll RN  Sent: 5/1/2019   1:54 PM  To: Beau Medrano MD    Are there any MRI results that I can give him?  Mj  ----- Message -----  From: Linda Lindsey  Sent: 5/1/2019  10:58 AM  To: Mitchell Yanez Staff    Test Results    Type of Test:MRI  Date of Test:04/24/19  Communication Preference:Pt:[phone]  Additional Information:

## 2019-05-01 NOTE — TELEPHONE ENCOUNTER
----- Message from Linda Lindsey sent at 5/1/2019 10:58 AM CDT -----  Contact: Pt:[phone]  Test Results    Type of Test:MRI  Date of Test:04/24/19  Communication Preference:Pt:[phone]  Additional Information:

## 2019-05-01 NOTE — TELEPHONE ENCOUNTER
Left message for patient informing him that Dr. Medrano will get back with him regarding his MRI result.

## 2019-05-03 ENCOUNTER — TELEPHONE (OUTPATIENT)
Dept: SURGERY | Facility: CLINIC | Age: 74
End: 2019-05-03

## 2019-05-03 DIAGNOSIS — K60.3 FISTULA-IN-ANO: Primary | ICD-10-CM

## 2019-05-03 NOTE — TELEPHONE ENCOUNTER
MRI pelvis - Left anterolateral perianal scar formation with small recurrent transsphincteric fistula extending to the left peroneal soft tissues/ base of the scrotum.  No fluid collections.    Discussed results with the patient. He agrees to undergo examination under anesthesia with seton placement.    Will schedule procedure for 05/08/2019.

## 2019-05-03 NOTE — TELEPHONE ENCOUNTER
Called patient. Lbs and EKG scheduled for 5/6. Verbal pre op instructions for surgery scheduled 5/8 provided. Patient verbalized understanding of information provided.

## 2019-05-06 ENCOUNTER — HOSPITAL ENCOUNTER (OUTPATIENT)
Dept: CARDIOLOGY | Facility: CLINIC | Age: 74
Discharge: HOME | End: 2019-05-06
Payer: MEDICARE

## 2019-05-06 DIAGNOSIS — K60.3 FISTULA-IN-ANO: ICD-10-CM

## 2019-05-06 PROCEDURE — 93010 EKG 12-LEAD: ICD-10-PCS | Mod: S$GLB,,, | Performed by: INTERNAL MEDICINE

## 2019-05-06 PROCEDURE — 93010 ELECTROCARDIOGRAM REPORT: CPT | Mod: S$GLB,,, | Performed by: INTERNAL MEDICINE

## 2019-05-06 PROCEDURE — 93005 EKG 12-LEAD: ICD-10-PCS | Mod: S$GLB,,, | Performed by: COLON & RECTAL SURGERY

## 2019-05-06 PROCEDURE — 93005 ELECTROCARDIOGRAM TRACING: CPT | Mod: S$GLB,,, | Performed by: COLON & RECTAL SURGERY

## 2019-05-06 NOTE — PRE-PROCEDURE INSTRUCTIONS
Preop instructions: NPO solids/ milk products after midnight or clears up to 2 hours before arrival (clear liquids are: water, sugar-free, Gatorade & Jell-O, black coffee/no milk, cream or creamer), shower instructions, directions, leave all valuables at home, medication instructions for PM prior & am of procedure explained. Patient stated an understanding.      Patient denies any side effects or issues with anesthesia or sedation.                                                                                                                                    Patient does not know arrival time. Explained that this information comes from the surgeons office and if they have not heard from them by 2 pm, to call office. Patient stated an understanding.

## 2019-05-07 ENCOUNTER — TELEPHONE (OUTPATIENT)
Dept: SURGERY | Facility: CLINIC | Age: 74
End: 2019-05-07

## 2019-05-07 ENCOUNTER — ANESTHESIA EVENT (OUTPATIENT)
Dept: SURGERY | Facility: HOSPITAL | Age: 74
End: 2019-05-07
Payer: MEDICARE

## 2019-05-07 NOTE — ANESTHESIA PREPROCEDURE EVALUATION
05/07/2019  Aguilar Waldrop is a 73 y.o., male with a pre-operative diagnosis of Fistula-in-ano [K60.3] who is scheduled for Procedure(s) (LRB):  PLACEMENT-SETON DRAIN (N/A).     Requested anesthesia type: Choice  Surgeon: Beau Medrano MD  Allergies: Review of patient's allergies indicates:  No Known Allergies  Vital Sign Range:    Chronic Medications:   No medications prior to admission.     Current Medications:   No current facility-administered medications for this encounter.      Current Outpatient Medications   Medication Sig Dispense Refill    ascorbic acid, vitamin C, (VITAMIN C) 1000 MG tablet Take 1,000 mg by mouth.      atorvastatin (LIPITOR) 40 MG tablet Take 40 mg by mouth once daily.       carvedilol (COREG) 3.125 MG tablet Take 3.125 mg by mouth once daily.       cholecalciferol, vitamin D3, (VITAMIN D3) 1,000 unit capsule Take 1,000 Units by mouth.      cyanocobalamin (VITAMIN B-12) 1000 MCG tablet Take 100 mcg by mouth.      glimepiride (AMARYL) 1 MG tablet Take 0.5 mg by mouth 2 (two) times daily.       loratadine (CLARITIN) 10 mg tablet Take 10 mg by mouth once daily.       losartan (COZAAR) 50 MG tablet Take 50 mg by mouth once daily.       metFORMIN (GLUCOPHAGE) 1000 MG tablet Take 1,000 mg by mouth 2 (two) times daily with meals.       multivitamin (THERAGRAN) per tablet Take 1 tablet by mouth.      aspirin (ECOTRIN) 81 MG EC tablet Take 81 mg by mouth.      nystatin (MYCOSTATIN) cream        Medical History:   Past Medical History:   Diagnosis Date    Allergy     Diabetes mellitus     Hypertension     Prostate cancer     Urinary leakage      .    Anesthesia Evaluation    I have reviewed the Patient Summary Reports.    I have reviewed the Nursing Notes.   I have reviewed the Medications.     Review of Systems  Anesthesia Hx:  No problems with previous Anesthesia   Denies Family Hx of Anesthesia complications.   Denies Personal Hx of Anesthesia complications.   Hematology/Oncology:  Hematology Normal   Oncology Normal     EENT/Dental:EENT/Dental Normal   Cardiovascular:   Hypertension, poorly controlled Denies MI.  Denies CAD.    Denies Dysrhythmias.   Denies Angina.    Pulmonary:  Pulmonary Normal    Renal/:  Renal/ Normal     Hepatic/GI:  Hepatic/GI Normal    Musculoskeletal:  Musculoskeletal Normal    Neurological:  Neurology Normal    Endocrine:   Diabetes, type 2, using insulin    Dermatological:  Skin Normal    Psych:  Psychiatric Normal           Physical Exam  General:  Well nourished    Airway/Jaw/Neck:  Airway Findings: Mouth Opening: Normal Tongue: Normal  General Airway Assessment: Adult, Good  Mallampati: II  Improves to II with phonation.  TM Distance: Normal, at least 6 cm  Jaw/Neck Findings:     Neck ROM: Normal ROM      Dental:  Dental Findings: In tact, Periodontal disease, Mild   Chest/Lungs:  Chest/Lungs Findings: Clear to auscultation, Normal Respiratory Rate     Heart/Vascular:  Heart Findings: Rate: Normal  Rhythm: Regular Rhythm  Sounds: Normal     Abdomen:  Abdomen Findings:  Normal, Soft, Nontender            Anesthesia Plan  Type of Anesthesia, risks & benefits discussed:  Anesthesia Type:  general, MAC  Patient's Preference: as indicated  Intra-op Monitoring Plan: standard ASA monitors  Intra-op Monitoring Plan Comments:   Post Op Pain Control Plan:   Post Op Pain Control Plan Comments:   Induction:   IV  Beta Blocker:  Patient is on a Beta-Blocker and has received one dose within the past 24 hours (No further documentation required).       Informed Consent: Patient understands risks and agrees with Anesthesia plan.  Questions answered. Anesthesia consent signed with patient.  ASA Score: 3     Day of Surgery Review of History & Physical:  There are no significant changes.  H&P update referred to the provider.     Anesthesia Plan Notes: Risks of  dental and eye injury reviewed with patient, agrees to proceed. Reassurance given.        Ready For Surgery From Anesthesia Perspective.

## 2019-05-08 ENCOUNTER — ANESTHESIA (OUTPATIENT)
Dept: SURGERY | Facility: HOSPITAL | Age: 74
End: 2019-05-08
Payer: MEDICARE

## 2019-05-08 ENCOUNTER — HOSPITAL ENCOUNTER (OUTPATIENT)
Facility: HOSPITAL | Age: 74
Discharge: HOME | End: 2019-05-08
Attending: COLON & RECTAL SURGERY | Admitting: COLON & RECTAL SURGERY
Payer: MEDICARE

## 2019-05-08 VITALS
HEIGHT: 71 IN | TEMPERATURE: 98 F | SYSTOLIC BLOOD PRESSURE: 180 MMHG | WEIGHT: 180 LBS | BODY MASS INDEX: 25.2 KG/M2 | OXYGEN SATURATION: 100 % | DIASTOLIC BLOOD PRESSURE: 75 MMHG | RESPIRATION RATE: 16 BRPM | HEART RATE: 56 BPM

## 2019-05-08 DIAGNOSIS — L98.8 FISTULA: Primary | ICD-10-CM

## 2019-05-08 LAB
POCT GLUCOSE: 155 MG/DL (ref 70–110)
POCT GLUCOSE: 173 MG/DL (ref 70–110)

## 2019-05-08 PROCEDURE — 25000003 PHARM REV CODE 250: Performed by: NURSE PRACTITIONER

## 2019-05-08 PROCEDURE — 82962 GLUCOSE BLOOD TEST: CPT | Mod: 91 | Performed by: COLON & RECTAL SURGERY

## 2019-05-08 PROCEDURE — 25000003 PHARM REV CODE 250: Performed by: NURSE ANESTHETIST, CERTIFIED REGISTERED

## 2019-05-08 PROCEDURE — 37000008 HC ANESTHESIA 1ST 15 MINUTES: Performed by: COLON & RECTAL SURGERY

## 2019-05-08 PROCEDURE — 27000221 HC OXYGEN, UP TO 24 HOURS

## 2019-05-08 PROCEDURE — 94761 N-INVAS EAR/PLS OXIMETRY MLT: CPT

## 2019-05-08 PROCEDURE — 36000704 HC OR TIME LEV I 1ST 15 MIN: Performed by: COLON & RECTAL SURGERY

## 2019-05-08 PROCEDURE — S0028 INJECTION, FAMOTIDINE, 20 MG: HCPCS | Performed by: ANESTHESIOLOGY

## 2019-05-08 PROCEDURE — C9290 INJ, BUPIVACAINE LIPOSOME: HCPCS | Performed by: COLON & RECTAL SURGERY

## 2019-05-08 PROCEDURE — 36000705 HC OR TIME LEV I EA ADD 15 MIN: Performed by: COLON & RECTAL SURGERY

## 2019-05-08 PROCEDURE — 63600175 PHARM REV CODE 636 W HCPCS: Performed by: NURSE ANESTHETIST, CERTIFIED REGISTERED

## 2019-05-08 PROCEDURE — 46020 PLACEMENT OF SETON: CPT | Mod: ,,, | Performed by: COLON & RECTAL SURGERY

## 2019-05-08 PROCEDURE — 25000003 PHARM REV CODE 250: Performed by: STUDENT IN AN ORGANIZED HEALTH CARE EDUCATION/TRAINING PROGRAM

## 2019-05-08 PROCEDURE — D9220A PRA ANESTHESIA: Mod: ANES,,, | Performed by: ANESTHESIOLOGY

## 2019-05-08 PROCEDURE — 25000003 PHARM REV CODE 250: Performed by: ANESTHESIOLOGY

## 2019-05-08 PROCEDURE — 37000009 HC ANESTHESIA EA ADD 15 MINS: Performed by: COLON & RECTAL SURGERY

## 2019-05-08 PROCEDURE — 63600175 PHARM REV CODE 636 W HCPCS: Performed by: COLON & RECTAL SURGERY

## 2019-05-08 PROCEDURE — 25000003 PHARM REV CODE 250: Performed by: COLON & RECTAL SURGERY

## 2019-05-08 PROCEDURE — D9220A PRA ANESTHESIA: Mod: CRNA,,, | Performed by: NURSE ANESTHETIST, CERTIFIED REGISTERED

## 2019-05-08 PROCEDURE — D9220A PRA ANESTHESIA: ICD-10-PCS | Mod: ANES,,, | Performed by: ANESTHESIOLOGY

## 2019-05-08 PROCEDURE — 46020 PR PLACEMENT,SETON: ICD-10-PCS | Mod: ,,, | Performed by: COLON & RECTAL SURGERY

## 2019-05-08 PROCEDURE — D9220A PRA ANESTHESIA: ICD-10-PCS | Mod: CRNA,,, | Performed by: NURSE ANESTHETIST, CERTIFIED REGISTERED

## 2019-05-08 PROCEDURE — 71000015 HC POSTOP RECOV 1ST HR: Performed by: COLON & RECTAL SURGERY

## 2019-05-08 PROCEDURE — 82962 GLUCOSE BLOOD TEST: CPT | Performed by: COLON & RECTAL SURGERY

## 2019-05-08 PROCEDURE — 71000033 HC RECOVERY, INTIAL HOUR: Performed by: COLON & RECTAL SURGERY

## 2019-05-08 RX ORDER — GLYCOPYRROLATE 0.2 MG/ML
INJECTION INTRAMUSCULAR; INTRAVENOUS
Status: DISCONTINUED | OUTPATIENT
Start: 2019-05-08 | End: 2019-05-08

## 2019-05-08 RX ORDER — BUPIVACAINE HYDROCHLORIDE 2.5 MG/ML
INJECTION, SOLUTION EPIDURAL; INFILTRATION; INTRACAUDAL
Status: DISCONTINUED | OUTPATIENT
Start: 2019-05-08 | End: 2019-05-08 | Stop reason: HOSPADM

## 2019-05-08 RX ORDER — LIDOCAINE HCL/PF 100 MG/5ML
SYRINGE (ML) INTRAVENOUS
Status: DISCONTINUED | OUTPATIENT
Start: 2019-05-08 | End: 2019-05-08

## 2019-05-08 RX ORDER — ONDANSETRON 2 MG/ML
4 INJECTION INTRAMUSCULAR; INTRAVENOUS EVERY 12 HOURS PRN
Status: DISCONTINUED | OUTPATIENT
Start: 2019-05-08 | End: 2019-05-08 | Stop reason: HOSPADM

## 2019-05-08 RX ORDER — FENTANYL CITRATE 50 UG/ML
INJECTION, SOLUTION INTRAMUSCULAR; INTRAVENOUS
Status: DISCONTINUED | OUTPATIENT
Start: 2019-05-08 | End: 2019-05-08

## 2019-05-08 RX ORDER — FAMOTIDINE 10 MG/ML
20 INJECTION INTRAVENOUS ONCE
Status: COMPLETED | OUTPATIENT
Start: 2019-05-08 | End: 2019-05-08

## 2019-05-08 RX ORDER — LIDOCAINE HYDROCHLORIDE 10 MG/ML
1 INJECTION, SOLUTION EPIDURAL; INFILTRATION; INTRACAUDAL; PERINEURAL ONCE
Status: COMPLETED | OUTPATIENT
Start: 2019-05-08 | End: 2019-05-08

## 2019-05-08 RX ORDER — PROPOFOL 10 MG/ML
VIAL (ML) INTRAVENOUS
Status: DISCONTINUED | OUTPATIENT
Start: 2019-05-08 | End: 2019-05-08

## 2019-05-08 RX ORDER — ONDANSETRON 2 MG/ML
INJECTION INTRAMUSCULAR; INTRAVENOUS
Status: DISCONTINUED | OUTPATIENT
Start: 2019-05-08 | End: 2019-05-08

## 2019-05-08 RX ORDER — LIDOCAINE HYDROCHLORIDE 10 MG/ML
1 INJECTION, SOLUTION EPIDURAL; INFILTRATION; INTRACAUDAL; PERINEURAL ONCE
Status: ACTIVE | OUTPATIENT
Start: 2019-05-08

## 2019-05-08 RX ORDER — SUCCINYLCHOLINE CHLORIDE 20 MG/ML
INJECTION INTRAMUSCULAR; INTRAVENOUS
Status: DISCONTINUED | OUTPATIENT
Start: 2019-05-08 | End: 2019-05-08

## 2019-05-08 RX ORDER — ACETAMINOPHEN 325 MG/1
650 TABLET ORAL EVERY 4 HOURS PRN
Status: DISCONTINUED | OUTPATIENT
Start: 2019-05-08 | End: 2019-05-08 | Stop reason: HOSPADM

## 2019-05-08 RX ORDER — ONDANSETRON 2 MG/ML
4 INJECTION INTRAMUSCULAR; INTRAVENOUS DAILY PRN
Status: DISCONTINUED | OUTPATIENT
Start: 2019-05-08 | End: 2019-05-08 | Stop reason: HOSPADM

## 2019-05-08 RX ORDER — KETOROLAC TROMETHAMINE 30 MG/ML
15 INJECTION, SOLUTION INTRAMUSCULAR; INTRAVENOUS EVERY 8 HOURS PRN
Status: DISCONTINUED | OUTPATIENT
Start: 2019-05-08 | End: 2019-05-08 | Stop reason: HOSPADM

## 2019-05-08 RX ORDER — METOCLOPRAMIDE HYDROCHLORIDE 5 MG/ML
10 INJECTION INTRAMUSCULAR; INTRAVENOUS EVERY 10 MIN PRN
Status: DISCONTINUED | OUTPATIENT
Start: 2019-05-08 | End: 2019-05-08 | Stop reason: HOSPADM

## 2019-05-08 RX ORDER — OXYCODONE HYDROCHLORIDE 5 MG/1
5 TABLET ORAL EVERY 6 HOURS PRN
Qty: 21 TABLET | Refills: 0 | Status: SHIPPED | OUTPATIENT
Start: 2019-05-08 | End: 2019-07-01

## 2019-05-08 RX ORDER — OXYCODONE HYDROCHLORIDE 5 MG/1
5 TABLET ORAL EVERY 4 HOURS PRN
Status: DISCONTINUED | OUTPATIENT
Start: 2019-05-08 | End: 2019-05-08 | Stop reason: HOSPADM

## 2019-05-08 RX ORDER — SODIUM CHLORIDE 9 MG/ML
INJECTION, SOLUTION INTRAVENOUS CONTINUOUS
Status: DISCONTINUED | OUTPATIENT
Start: 2019-05-08 | End: 2019-05-08 | Stop reason: HOSPADM

## 2019-05-08 RX ORDER — MIDAZOLAM HYDROCHLORIDE 1 MG/ML
INJECTION INTRAMUSCULAR; INTRAVENOUS
Status: DISCONTINUED | OUTPATIENT
Start: 2019-05-08 | End: 2019-05-08

## 2019-05-08 RX ORDER — OXYCODONE HYDROCHLORIDE 5 MG/1
10 TABLET ORAL EVERY 4 HOURS PRN
Status: DISCONTINUED | OUTPATIENT
Start: 2019-05-08 | End: 2019-05-08 | Stop reason: HOSPADM

## 2019-05-08 RX ORDER — SODIUM CHLORIDE 9 MG/ML
INJECTION, SOLUTION INTRAVENOUS CONTINUOUS
Status: ACTIVE | OUTPATIENT
Start: 2019-05-08

## 2019-05-08 RX ORDER — OXYCODONE AND ACETAMINOPHEN 5; 325 MG/1; MG/1
1 TABLET ORAL
Status: DISCONTINUED | OUTPATIENT
Start: 2019-05-08 | End: 2019-05-08 | Stop reason: HOSPADM

## 2019-05-08 RX ORDER — OXYCODONE HYDROCHLORIDE 5 MG/1
TABLET ORAL
Status: DISCONTINUED
Start: 2019-05-08 | End: 2019-05-08 | Stop reason: HOSPADM

## 2019-05-08 RX ORDER — FENTANYL CITRATE 50 UG/ML
25 INJECTION, SOLUTION INTRAMUSCULAR; INTRAVENOUS EVERY 5 MIN PRN
Status: DISCONTINUED | OUTPATIENT
Start: 2019-05-08 | End: 2019-05-08 | Stop reason: HOSPADM

## 2019-05-08 RX ORDER — MUPIROCIN 20 MG/G
OINTMENT TOPICAL
Status: ACTIVE | OUTPATIENT
Start: 2019-05-08

## 2019-05-08 RX ORDER — ROCURONIUM BROMIDE 10 MG/ML
INJECTION, SOLUTION INTRAVENOUS
Status: DISCONTINUED | OUTPATIENT
Start: 2019-05-08 | End: 2019-05-08

## 2019-05-08 RX ORDER — POLYETHYLENE GLYCOL 3350 17 G/17G
17 POWDER, FOR SOLUTION ORAL DAILY
Qty: 238 G | Refills: 0 | Status: SHIPPED | OUTPATIENT
Start: 2019-05-08 | End: 2019-07-01

## 2019-05-08 RX ADMIN — PROPOFOL 50 MG: 10 INJECTION, EMULSION INTRAVENOUS at 08:05

## 2019-05-08 RX ADMIN — ROCURONIUM BROMIDE 5 MG: 10 INJECTION, SOLUTION INTRAVENOUS at 08:05

## 2019-05-08 RX ADMIN — PROPOFOL 150 MG: 10 INJECTION, EMULSION INTRAVENOUS at 08:05

## 2019-05-08 RX ADMIN — SODIUM CHLORIDE: 0.9 INJECTION, SOLUTION INTRAVENOUS at 07:05

## 2019-05-08 RX ADMIN — SUCCINYLCHOLINE CHLORIDE 140 MG: 20 INJECTION, SOLUTION INTRAMUSCULAR; INTRAVENOUS at 08:05

## 2019-05-08 RX ADMIN — LIDOCAINE HYDROCHLORIDE 100 MG: 20 INJECTION, SOLUTION INTRAVENOUS at 08:05

## 2019-05-08 RX ADMIN — MIDAZOLAM HYDROCHLORIDE 1 MG: 1 INJECTION, SOLUTION INTRAMUSCULAR; INTRAVENOUS at 07:05

## 2019-05-08 RX ADMIN — FAMOTIDINE 20 MG: 10 INJECTION, SOLUTION INTRAVENOUS at 07:05

## 2019-05-08 RX ADMIN — FENTANYL CITRATE 50 MCG: 50 INJECTION, SOLUTION INTRAMUSCULAR; INTRAVENOUS at 08:05

## 2019-05-08 RX ADMIN — LIDOCAINE HYDROCHLORIDE 2 MG: 10 INJECTION, SOLUTION EPIDURAL; INFILTRATION; INTRACAUDAL; PERINEURAL at 06:05

## 2019-05-08 RX ADMIN — OXYCODONE HYDROCHLORIDE 5 MG: 5 TABLET ORAL at 09:05

## 2019-05-08 RX ADMIN — SODIUM CHLORIDE 500 ML: 0.9 INJECTION, SOLUTION INTRAVENOUS at 06:05

## 2019-05-08 RX ADMIN — MUPIROCIN: 20 OINTMENT TOPICAL at 07:05

## 2019-05-08 RX ADMIN — PROPOFOL 30 MG: 10 INJECTION, EMULSION INTRAVENOUS at 08:05

## 2019-05-08 RX ADMIN — GLYCOPYRROLATE 0.2 MG: 0.2 INJECTION, SOLUTION INTRAMUSCULAR; INTRAVENOUS at 08:05

## 2019-05-08 RX ADMIN — ONDANSETRON 4 MG: 2 INJECTION INTRAMUSCULAR; INTRAVENOUS at 08:05

## 2019-05-08 NOTE — BRIEF OP NOTE
[facility]  Brief Operative Note     SUMMARY     Surgery Date: 5/8/2019     Surgeon(s) and Role:     * Beau Medrano MD - Primary     * Nina Khan MD - Resident - Assisting     * Conor Barlow MD - Resident - Assisting        Pre-op Diagnosis:  Fistula-in-ano [K60.3]    Post-op Diagnosis:  Post-Op Diagnosis Codes:     * Fistula-in-ano [K60.3]    Procedure(s) (LRB):  PLACEMENT-SETON DRAIN (N/A)  EXAM UNDER ANESTHESIA (N/A)    Anesthesia: Choice    Description of the findings of the procedure: Anterior anal fistula involving muscle, likely transphincteric, seton placed    Findings/Key Components: transphincteric fistula    Estimated Blood Loss: 1 cc         Specimens:   Specimen (12h ago, onward)    None          Discharge Note    SUMMARY     Admit Date: 5/8/2019    Discharge Date and Time:  05/08/2019    Hospital Course (synopsis of major diagnoses, care, treatment, and services provided during the course of the hospital stay):      Final Diagnosis: Post-Op Diagnosis Codes:     * Fistula-in-ano [K60.3]    Disposition: Home or Self Care    Follow Up/Patient Instructions:     Okay to take a shower.     No lifting restrictions.    Take stool softeners as presribed and laxatives as needed while taking narcotic pain medication.  No driving while on narcotics and until you can react quickly without pain.    For pain, take 650 mg of tylenol every 6 hours as needed. You may alternate with Ibuprofen 3 hours after the tylenol if your pain is still uncontrolled. You also have narcotic pain medication for breakthrough pain.      Medications:  Reconciled Home Medications:      Medication List      START taking these medications    oxyCODONE 5 MG immediate release tablet  Commonly known as:  ROXICODONE  Take 1 tablet (5 mg total) by mouth every 6 (six) hours as needed for Pain.     polyethylene glycol 17 gram/dose powder  Commonly known as:  GLYCOLAX  Take 17 g by mouth once daily.         CONTINUE taking these medications    ascorbic acid (vitamin C) 1000 MG tablet  Commonly known as:  VITAMIN C  Take 1,000 mg by mouth.     aspirin 81 MG EC tablet  Commonly known as:  ECOTRIN  Take 81 mg by mouth.     atorvastatin 40 MG tablet  Commonly known as:  LIPITOR  Take 40 mg by mouth once daily.     carvedilol 3.125 MG tablet  Commonly known as:  COREG  Take 3.125 mg by mouth once daily.     cholecalciferol (vitamin D3) 1,000 unit capsule  Commonly known as:  VITAMIN D3  Take 1,000 Units by mouth.     cyanocobalamin 1000 MCG tablet  Commonly known as:  VITAMIN B-12  Take 100 mcg by mouth.     glimepiride 1 MG tablet  Commonly known as:  AMARYL  Take 0.5 mg by mouth 2 (two) times daily.     loratadine 10 mg tablet  Commonly known as:  CLARITIN  Take 10 mg by mouth once daily.     losartan 50 MG tablet  Commonly known as:  COZAAR  Take 50 mg by mouth once daily.     metFORMIN 1000 MG tablet  Commonly known as:  GLUCOPHAGE  Take 1,000 mg by mouth 2 (two) times daily with meals.     multivitamin per tablet  Commonly known as:  THERAGRAN  Take 1 tablet by mouth.     nystatin cream  Commonly known as:  MYCOSTATIN          Discharge Procedure Orders   Diet general     Call MD for:  extreme fatigue     Call MD for:  persistent dizziness or light-headedness     Call MD for:  hives     Call MD for:  redness, tenderness, or signs of infection (pain, swelling, redness, odor or green/yellow discharge around incision site)     Call MD for:  difficulty breathing, headache or visual disturbances     Call MD for:  severe uncontrolled pain     Call MD for:  persistent nausea and vomiting     Call MD for:  temperature >100.4     No dressing needed     Activity as tolerated   Order Comments: OK to shower.     Follow-up Information     Beau Medrano MD.    Specialty:  Colon and Rectal Surgery  Why:  post op seton check  Contact information:  [address]  [facility] [id number] [phone]

## 2019-05-08 NOTE — PROGRESS NOTES
Discharge instructions reviewed w/ pt and wife, paper rx given. Pt in NADN. No complaints at this time. Tolerated liquids w/ no issues. To be d/'cd home w/ wife.

## 2019-05-08 NOTE — INTERVAL H&P NOTE
The patient has been examined and the H&P has been reviewed:    I concur with the findings and changes have been noted since the H&P was written:      MRI pelvis 4/24/19:  There is left anterolateral perianal scar with suspected recurrent transsphincteric fistula (for example series 99639, image 37)..  There is extension to the left peroneal soft tissues near the base of the scrotum noting associated soft tissue edema and enhancement.  No discrete collection or abscess.    D/w patient - plan is to proceed with EUA & seton vs fistulotomy depending on degree of sphincter involvement.    I have discussed the procedure at length with Aguilar Waldrop.  We discussed the rationale, risks, benefits, and alternatives in depth.  We discussed the expected outcomes and potential complications including but not limited to bleeding, infection, recurrence, prolonged pain, need for further procedures and altered continence.  He verbalized his understanding of the procedure and wishes to proceed.  Written consent was obtained.    Anesthesia/Surgery risks, benefits and alternative options discussed and understood by patient/family.          Active Hospital Problems    Diagnosis  POA    Fistula [L98.8]  Yes      Resolved Hospital Problems   No resolved problems to display.

## 2019-05-08 NOTE — DISCHARGE INSTRUCTIONS
Okay to take a shower.     No lifting restrictions.    Take stool softeners as presribed and laxatives as needed while taking narcotic pain medication.  No driving while on narcotics and until you can react quickly without pain.    For pain, take 650 mg of tylenol every 6 hours as needed. You may alternate with Ibuprofen 3 hours after the tylenol if your pain is still uncontrolled. You also have narcotic pain medication for breakthrough pain.    Follow up in two weeks per resident

## 2019-05-08 NOTE — TRANSFER OF CARE
"Anesthesia Transfer of Care Note    Patient: Aguilar Waldrop    Procedure(s) Performed: Procedure(s) (LRB):  PLACEMENT-SETON DRAIN (N/A)  EXAM UNDER ANESTHESIA (N/A)    Patient location: PACU    Anesthesia Type: general    Transport from OR: Transported from OR on 6-10 L/min O2 by face mask with adequate spontaneous ventilation    Post pain: adequate analgesia    Post assessment: no apparent anesthetic complications and tolerated procedure well    Post vital signs: stable    Level of consciousness: responds to stimulation    Nausea/Vomiting: no nausea/vomiting    Complications: none    Transfer of care protocol was followed      Last vitals:   Visit Vitals  BP (!) 162/72   Pulse 65   Temp 36.7 °C (98.1 °F) (Temporal)   Resp 18   Ht 5' 11" (1.803 m)   Wt 81.6 kg (180 lb)   SpO2 100%   BMI 25.10 kg/m²     "

## 2019-05-08 NOTE — ANESTHESIA POSTPROCEDURE EVALUATION
Anesthesia Post Evaluation    Patient: Aguilar Waldrop    Procedure(s) Performed: Procedure(s) (LRB):  PLACEMENT-SETON DRAIN (N/A)  EXAM UNDER ANESTHESIA (N/A)    Final Anesthesia Type: general  Patient location during evaluation: [facility]  Patient participation: Yes- Able to Participate  Level of consciousness: awake and alert and oriented  Post-procedure vital signs: reviewed and stable  Pain management: adequate  Airway patency: patent  PONV status at discharge: No PONV  Anesthetic complications: no      Cardiovascular status: stable  Respiratory status: unassisted, spontaneous ventilation and room air  Hydration status: euvolemic  Follow-up not needed.          Vitals Value Taken Time   /75 5/8/2019 10:17 AM   Temp 36.6 °C (97.9 °F) 5/8/2019 10:15 AM   Pulse 53 5/8/2019 10:18 AM   Resp 16 5/8/2019 10:15 AM   SpO2 100 % 5/8/2019 10:18 AM   Vitals shown include unvalidated device data.      Event Time     Out of Recovery 09:44:23          Pain/Quiana Score: Pain Rating Prior to Med Admin: 0 (5/8/2019  9:29 AM)  Pain Rating Post Med Admin: 0 (5/8/2019  9:36 AM)  Quiana Score: 10 (5/8/2019 10:19 AM)

## 2019-05-09 ENCOUNTER — TELEPHONE (OUTPATIENT)
Dept: SURGERY | Facility: CLINIC | Age: 74
End: 2019-05-09

## 2019-05-09 NOTE — TELEPHONE ENCOUNTER
Spoke with patient.  Informed him that 5/27 is okay for him to come back for a post op appointment.  Appointment scheduled and mailed.

## 2019-05-09 NOTE — TELEPHONE ENCOUNTER
----- Message from Mary Gracia sent at 5/9/2019 12:21 PM CDT -----  Contact: pt# [phone]  Needs Advice    Reason for call:Pt needs a two week post op appt. I offered 05/27 pt dcl        Communication Preference:call  Additional Information:

## 2019-05-12 NOTE — OP NOTE
DATE OF PROCEDURE:  05/08/2019    PREOPERATIVE DIAGNOSIS:  Anal fistula.    POSTOPERATIVE DIAGNOSIS:  Transsphincteric anal fistula.    PROCEDURE:  Evaluation under anesthesia with placement of a draining seton.    SURGEON:  Beau Medrano M.D.    ASSISTANTS:  Nina Cota M.D. (RES), and Dr. Conor Barlow.    ANESTHESIA:  General endotracheal.    IV FLUIDS:  300 mL of crystalloid.    ESTIMATED BLOOD LOSS:  Less than 5 mL    DRAINS:  Draining seton.    SPECIMENS:  None.    COMPLICATIONS:  None.    OPERATIVE FINDINGS:  Transsphincteric fistula in the left anterolateral position   with the internal opening at the level of the anterior midline dentate line and   the external opening on the left anterolateral anal margin 6 cm from the anal   verge.    INDICATIONS:  The patient is a 73-year-old male with a history of recurrent anal   fistula.  He presents for evaluation and further management.    PROCEDURE IN DETAIL:  The patient was identified, brought to the Operative Room   and placed on a stretcher in a supine position after obtaining informed consent.    Venous sequential stockings were placed.  After induction of general   endotracheal anesthesia, he was repositioned on the operating table in a prone   position using chest rolls and adequate padding of all pressure points.  The   table was jackknifed and the buttocks were taped apart to efface the anal verge.    The perianal region was prepped and draped in usual sterile fashion. Visual   inspection of the anal verge and anal margin revealed there appeared to be an   external fistula opening in the left anterolateral position close to the base of   the scrotum.  It was approximately 6 cm from the anal verge.  Evaluation of the   anal canal was performed with lighted Tejada retractors.  There was no obvious   evidence of significant anorectal disease or proctitis.  With the anoscope in   place, we placed a fistula probe through the external skin opening and this    passed very easily through the fistula tract to exit an interior fistula opening   in the anterior midline at the level of the dentate line.  Tissue distal to the   probe was palpated and there appeared to be a fairly significant amount of   muscle distal to the fistula tract, indicating a transsphincteric fistula.    Given this as well as the recurrent nature of the fistula, I elected to place a   draining seton.  A vessel loop was secured to the end of the fistula probe with   a silk tie and the probe was removed, drawing the vessel loop through the fistula   tract.  The ends of the vessel loop were secured to each other with 0 silk ties   for it to be left in place as a draining seton.  Gelfoam gauze was placed   within the anal canal.  An anal block was performed using a combination of   Exparel and 0.25% Marcaine for postoperative analgesia and sterile dressings   were applied.    The patient tolerated the procedure well with no complications.  He was   extubated in the Operating Room and taken to Recovery in satisfactory condition.    All needle, instrument and sponge counts were correct at the end of the case.    I was present throughout the entire procedure.      HUEY  dd: 05/12/2019 06:38:05 (CDT)  td: 05/12/2019 15:03:47 (CDT)  Doc ID   #[id number]  Job ID #[id number]    CC:

## 2019-05-27 ENCOUNTER — OFFICE VISIT (OUTPATIENT)
Dept: SURGERY | Facility: CLINIC | Age: 74
End: 2019-05-27
Payer: MEDICARE

## 2019-05-27 VITALS
DIASTOLIC BLOOD PRESSURE: 74 MMHG | HEIGHT: 71 IN | HEART RATE: 64 BPM | BODY MASS INDEX: 24.81 KG/M2 | SYSTOLIC BLOOD PRESSURE: 164 MMHG | WEIGHT: 177.25 LBS

## 2019-05-27 DIAGNOSIS — K60.3 ANAL FISTULA: Primary | ICD-10-CM

## 2019-05-27 PROCEDURE — 99024 POSTOP FOLLOW-UP VISIT: CPT | Mod: S$GLB,,, | Performed by: COLON & RECTAL SURGERY

## 2019-05-27 PROCEDURE — 99999 PR PBB SHADOW E&M-EST. PATIENT-LVL III: CPT | Mod: PBBFAC,,, | Performed by: COLON & RECTAL SURGERY

## 2019-05-27 PROCEDURE — 99024 PR POST-OP FOLLOW-UP VISIT: ICD-10-PCS | Mod: S$GLB,,, | Performed by: COLON & RECTAL SURGERY

## 2019-05-27 PROCEDURE — 99999 PR PBB SHADOW E&M-EST. PATIENT-LVL III: ICD-10-PCS | Mod: PBBFAC,,, | Performed by: COLON & RECTAL SURGERY

## 2019-05-27 RX ORDER — MENTHOL 5.8 MG/1
100 LOZENGE ORAL DAILY
COMMUNITY
Start: 2019-05-10

## 2019-05-27 RX ORDER — LORATADINE 10 MG/1
TABLET ORAL
COMMUNITY
Start: 2019-05-10

## 2019-05-27 NOTE — PROGRESS NOTES
CRS Post-operative visit    Visit Info:     Procedure: Evaluation under anesthesia with placement of a draining seton.    Date of Procedure: May 8, 2019    Indication: 72 yo M with recurrent anal fistula after having undergone multiple surgical procedures here at [facility] by Dr. Vu and at other facilities as well by other surgeons.  He is extremely frustrated with ongoing pain and drainage, says that it significantly impact his quality of life due to pain with prolonged sitting.  On exam he had an external fistula opening left anterolateral anal margin, without significant surrounding erythema/induration/crepitus/fluctuance.  For at the time of EUA he was found to have a transsphincteric fistula, and a draining seton was placed.    Current Status:  Doing well postop.  He complains of irritation from the seton but otherwise no significant complaints.  He reports scant drainage from the fistula.  No fevers or chills.  No problems with bowel movements.    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  RTO 7/1/19 for preop visit    Beau Medrano MD, FACS, FASCRS  Senior Staff Surgeon  Department of Colon & Rectal Surgery

## 2019-05-27 NOTE — LETTER
May 27, 2019      Leonel Holland MD  [address]  [address]  [place] [address]           [facility]  [address]  [place] [address]  Phone: [phone]          Patient: Aguilar Waldrop   MR Number: [id number]   YOB: 1945   Date of Visit: 5/27/2019       Dear Dr. Coleman:    Thank you for referring Aguilar Waldrop to me for evaluation. Attached you will find relevant portions of my assessment and plan of care.    If you have questions, please do not hesitate to call me. I look forward to following Aguilar Waldrop along with you.    Sincerely,    Beau Medrano MD    Enclosure  CC:  No Recipients    If you would like to receive this communication electronically, please contact [email or web address] or [phone] to request more information on [employer] Link access.    For providers and/or their staff who would like to refer a patient to [facility], please contact us through our one-stop-shop provider referral line, [facility], at [phone].    If you feel you have received this communication in error or would no longer like to receive these types of communications, please e-mail [email or web address]

## 2019-06-04 ENCOUNTER — ANESTHESIA EVENT (OUTPATIENT)
Dept: SURGERY | Facility: HOSPITAL | Age: 74
End: 2019-06-04
Payer: MEDICARE

## 2019-06-04 NOTE — PRE ADMISSION SCREENING
Anesthesia Assessment: Preoperative EQUATION    Planned Procedure: Procedure(s) (LRB):  LIGATION-INTERSPHINCTERIC FISTULA TRACT (N/A)  Requested Anesthesia Type:General  Surgeon: Beau Medrano MD  Service: Colon and Rectal  Known or anticipated Date of Surgery:7/12/2019    Surgeon notes: reviewed and Anal fistula    Previous anesthesia records:5/8/1966-Mytzqzzvh-Wqzqp Drain /EUA-General-Complicating Factors: Overbite-no apparent anesthetic complications and tolerated procedure well  Anesthesia Hx:  No problems with previous Anesthesia  Denies Family Hx of Anesthesia complications.   Denies Personal Hx of Anesthesia complications.   Last PCP note: within 3 months , outside [facility] , focused visit   Subspecialty notes: Urolgy/Wound Care/ Hyperbarics  Tests already available:  Results have been reviewed.Labs-5/6/19-POCT glucose/CBC/BMP/ 4/24/19-ISTAT Creatinine/EKG-5/6/19      Plan:    Testing:  None needed at ths time      Patient  has previously scheduled Medical Appointment:Appointment on 7/1/19, prior to surgery date.    Navigation:              Consults scheduled.POC & Perioperative IM Consult on ?             Results will be tracked by Preop Clinic.                 Caroline Beltrán RN  6/4/19

## 2019-06-04 NOTE — ANESTHESIA PREPROCEDURE EVALUATION
Anesthesia Assessment: Preoperative EQUATION    Planned Procedure: Procedure(s) (LRB):  LIGATION-INTERSPHINCTERIC FISTULA TRACT (N/A)  Requested Anesthesia Type:General  Surgeon: Beau Medrano MD  Service: Colon and Rectal  Known or anticipated Date of Surgery:7/12/2019    Surgeon notes: reviewed and Anal fistula    Previous anesthesia records:5/8/1988-Xeivszpiz-Gzptb Drain /EUA-General-Complicating Factors: Overbite-no apparent anesthetic complications and tolerated procedure well  Anesthesia Hx:  No problems with previous Anesthesia  Denies Family Hx of Anesthesia complications.   Denies Personal Hx of Anesthesia complications.   Last PCP note: within 3 months , outside [facility] , focused visit   Subspecialty notes: Urolgy/Wound Care/ Hyperbarics  Tests already available:  Results have been reviewed.Labs-5/6/19-POCT glucose/CBC/BMP/ 4/24/19-ISTAT Creatinine/EKG-5/6/19      Plan:    Testing: Lab needed prior to surgery date:A1c      Patient  has previously scheduled Medical Appointment:Appointment on 7/1/19, prior to surgery date.    Navigation:  Lab. Lab ordered:A1c on 7/1/19 @ 11:15a             Consults scheduled.POC & Perioperative IM Consult on 7/1/19 @ 10a & 1p             Results will be tracked by Preop Clinic.                 Caroline Beltrán RN  6/4/19 7/1/19 Dr Sneed clearance:   Preoperative cardiac risk assessment-  The patient does not have any active cardiac conditions . Revised cardiac risk index predictors- -0--.Functional capacity is more than 4 Mets. He will be undergoing a anal  procedure that carries a intermediate risk      Risk of a major Cardiac event ( Defined as death, myocardial infarction, or cardiac arrest at 30 days after noncardiac surgery), based on RCRI score      3.9%         No further cardiac work up is indicated prior to proceeding with the surgery         American Society of Anesthesiologists Physical status classification ( ASA )  class: 2     Postoperative pulmonary complication risk assessment:      ARISCAT ( Canet) risk index- risk class -  Low, if duration of surgery is under 3 hours, intermediate, if duration of surgery is over 3 hours       7/2/19 OS cards note, echo and stress test 4/2017 obtained, will scan to media.                                                                    06/04/2019  Aguilar Waldrop is a 73 y.o., male.    Anesthesia Evaluation         Review of Systems  Anesthesia Hx:  No problems with previous Anesthesia Pt requests to be completely sedated prior to entering OR. History of prior surgery of interest to airway management or planning: Previous anesthesia: General 5/8/19 Seton drain with general anesthesia. Procedure performed at an [facility] Facility. Denies Family Hx of Anesthesia complications.   Denies Personal Hx of Anesthesia complications.   Social:  Non-Smoker, No Alcohol Use    Hematology/Oncology:  Hematology Normal       -- Cancer in past history (HX prostate cancer-treated with surgery 9 yrs ago):    EENT/Dental:EENT/Dental Normal   Cardiovascular:   Hypertension Valvular problems/Murmurs (no diagnosis)  Denies Angina. hyperlipidemia  Functional Capacity good / => 4 METS  Hypertension , Well Controlled on Rx , Recent typical clinic B/P of 155/72    Pulmonary:   Denies Shortness of breath.  Denies Recent URI.  Possible Obstructive Sleep Apnea , (STOP/BANG) Symptoms S - Snoring (loud), A - Age > 50 and G - Gender (Male > Female)    Renal/:  Renal Symptoms/Infections/Stones: incontinence.    Hepatic/GI:  Fistula, Anal Fistula   Musculoskeletal:   Arthritis  Knee pain   Neurological:  Neurology Normal    Endocrine:   Diabetes  Diabetes, Type 2 Diabetes , controlled by oral hypoglycemics. Typical AM glucose range: 103 , most recent HgA1c value was 6.5 on 7/1/19.    Psych:   anxiety          Physical Exam  General:  Well nourished    Airway/Jaw/Neck:  Airway Findings: Mouth Opening: Normal  Tongue: Normal  Jaw/Neck Findings:  Neck ROM: Normal ROM     Eyes/Ears/Nose:  Eyes/Ears/Nose Findings:    Dental:  Dental Findings: In tact   Chest/Lungs:  Chest/Lungs Findings: Clear to auscultation     Heart/Vascular:  Heart Findings: Rate: Normal        Mental Status:  Mental Status Findings:  Cooperative, Alert and Oriented         Anesthesia Plan  Type of Anesthesia, risks & benefits discussed:  Anesthesia Type:  general  Patient's Preference:   Intra-op Monitoring Plan:   Intra-op Monitoring Plan Comments:   Post Op Pain Control Plan:   Post Op Pain Control Plan Comments:   Induction:   IV  Beta Blocker:  Patient is not currently on a Beta-Blocker (No further documentation required).       Informed Consent: Patient understands risks and agrees with Anesthesia plan.  Questions answered. Anesthesia consent signed with patient.  ASA Score: 2     Day of Surgery Review of History & Physical:    H&P update referred to the surgeon.         Ready For Surgery From Anesthesia Perspective.

## 2019-06-05 ENCOUNTER — TELEPHONE (OUTPATIENT)
Dept: PREADMISSION TESTING | Facility: HOSPITAL | Age: 74
End: 2019-06-05

## 2019-06-05 DIAGNOSIS — Z01.818 PREOP TESTING: Primary | ICD-10-CM

## 2019-06-05 DIAGNOSIS — E08.00 DIABETES MELLITUS DUE TO UNDERLYING CONDITION WITH HYPEROSMOLARITY WITHOUT COMA, WITHOUT LONG-TERM CURRENT USE OF INSULIN: ICD-10-CM

## 2019-06-05 NOTE — TELEPHONE ENCOUNTER
----- Message from Caroline Beltrán RN sent at 6/5/2019 11:09 AM CDT -----  Regarding: preop appts.  Terrie Frias, Patient is having a Ligation-Intersphincteric Fistula Tract on 7/12/19, with Dr. Medrano. Patient will need the following appts., prior to the surgery date:POC/OPOC/Lab-A1c(ordered). Thank you.  PJ

## 2019-06-13 ENCOUNTER — TELEPHONE (OUTPATIENT)
Dept: SURGERY | Facility: CLINIC | Age: 74
End: 2019-06-13

## 2019-06-13 NOTE — TELEPHONE ENCOUNTER
----- Message from Mary Gracia sent at 6/13/2019  9:55 AM CDT -----  Contact: pt#[phone]  Needs Advice    Reason for call:Pt wants to speak with about his prep op appt  Communication Preference:call    Additional Information:

## 2019-06-13 NOTE — TELEPHONE ENCOUNTER
Left message for patient informing him of the new appointment times.  Appointment slips mailed to patient.

## 2019-07-01 ENCOUNTER — OFFICE VISIT (OUTPATIENT)
Dept: SURGERY | Facility: CLINIC | Age: 74
End: 2019-07-01
Payer: MEDICARE

## 2019-07-01 ENCOUNTER — INITIAL CONSULT (OUTPATIENT)
Dept: INTERNAL MEDICINE | Facility: CLINIC | Age: 74
End: 2019-07-01
Payer: MEDICARE

## 2019-07-01 ENCOUNTER — HOSPITAL ENCOUNTER (OUTPATIENT)
Dept: PREADMISSION TESTING | Facility: HOSPITAL | Age: 74
Discharge: HOME | End: 2019-07-01
Attending: ANESTHESIOLOGY
Payer: MEDICARE

## 2019-07-01 VITALS
HEART RATE: 62 BPM | DIASTOLIC BLOOD PRESSURE: 72 MMHG | HEIGHT: 71 IN | WEIGHT: 178 LBS | SYSTOLIC BLOOD PRESSURE: 155 MMHG | RESPIRATION RATE: 16 BRPM | BODY MASS INDEX: 24.92 KG/M2 | TEMPERATURE: 98 F | OXYGEN SATURATION: 100 %

## 2019-07-01 VITALS
DIASTOLIC BLOOD PRESSURE: 68 MMHG | BODY MASS INDEX: 24.81 KG/M2 | HEIGHT: 71 IN | SYSTOLIC BLOOD PRESSURE: 151 MMHG | HEART RATE: 67 BPM | WEIGHT: 177.25 LBS

## 2019-07-01 VITALS — SYSTOLIC BLOOD PRESSURE: 128 MMHG | DIASTOLIC BLOOD PRESSURE: 68 MMHG

## 2019-07-01 DIAGNOSIS — R01.1 CARDIAC MURMUR: ICD-10-CM

## 2019-07-01 DIAGNOSIS — R06.83 SNORING: ICD-10-CM

## 2019-07-01 DIAGNOSIS — E78.5 HYPERLIPIDEMIA, UNSPECIFIED HYPERLIPIDEMIA TYPE: ICD-10-CM

## 2019-07-01 DIAGNOSIS — Z01.818 PREOP EXAMINATION: Primary | ICD-10-CM

## 2019-07-01 DIAGNOSIS — I10 ESSENTIAL HYPERTENSION: ICD-10-CM

## 2019-07-01 DIAGNOSIS — N39.3 STRESS INCONTINENCE, MALE: ICD-10-CM

## 2019-07-01 DIAGNOSIS — Z79.02 LONG TERM (CURRENT) USE OF ANTITHROMBOTICS/ANTIPLATELETS: ICD-10-CM

## 2019-07-01 DIAGNOSIS — D64.9 ANEMIA, UNSPECIFIED TYPE: ICD-10-CM

## 2019-07-01 DIAGNOSIS — K60.3 ANAL FISTULA: ICD-10-CM

## 2019-07-01 DIAGNOSIS — K60.3 ANAL FISTULA: Primary | ICD-10-CM

## 2019-07-01 DIAGNOSIS — E11.9 TYPE 2 DIABETES MELLITUS WITHOUT COMPLICATION, WITHOUT LONG-TERM CURRENT USE OF INSULIN: ICD-10-CM

## 2019-07-01 PROCEDURE — 99024 POSTOP FOLLOW-UP VISIT: CPT | Mod: S$GLB,,, | Performed by: COLON & RECTAL SURGERY

## 2019-07-01 PROCEDURE — 99204 PR OFFICE/OUTPT VISIT, NEW, LEVL IV, 45-59 MIN: ICD-10-PCS | Mod: S$GLB,,, | Performed by: HOSPITALIST

## 2019-07-01 PROCEDURE — 99999 PR PBB SHADOW E&M-EST. PATIENT-LVL III: ICD-10-PCS | Mod: PBBFAC,,, | Performed by: COLON & RECTAL SURGERY

## 2019-07-01 PROCEDURE — 99204 OFFICE O/P NEW MOD 45 MIN: CPT | Mod: S$GLB,,, | Performed by: HOSPITALIST

## 2019-07-01 PROCEDURE — 1101F PT FALLS ASSESS-DOCD LE1/YR: CPT | Mod: CPTII,S$GLB,, | Performed by: HOSPITALIST

## 2019-07-01 PROCEDURE — 1101F PR PT FALLS ASSESS DOC 0-1 FALLS W/OUT INJ PAST YR: ICD-10-PCS | Mod: CPTII,S$GLB,, | Performed by: HOSPITALIST

## 2019-07-01 PROCEDURE — 99999 PR PBB SHADOW E&M-EST. PATIENT-LVL III: CPT | Mod: PBBFAC,,, | Performed by: COLON & RECTAL SURGERY

## 2019-07-01 PROCEDURE — 99999 PR PBB SHADOW E&M-EST. PATIENT-LVL II: CPT | Mod: PBBFAC,,, | Performed by: HOSPITALIST

## 2019-07-01 PROCEDURE — 99024 PR POST-OP FOLLOW-UP VISIT: ICD-10-PCS | Mod: S$GLB,,, | Performed by: COLON & RECTAL SURGERY

## 2019-07-01 PROCEDURE — 99999 PR PBB SHADOW E&M-EST. PATIENT-LVL II: ICD-10-PCS | Mod: PBBFAC,,, | Performed by: HOSPITALIST

## 2019-07-01 RX ORDER — DM/P-EPHED/ACETAMINOPH/DOXYLAM
LIQUID (ML) ORAL
COMMUNITY

## 2019-07-01 NOTE — OUTPATIENT SUBJECTIVE & OBJECTIVE
Outpatient Subjective & Objective     Chief complaint-Preoperative evaluation, Perioperative Medical management, complication reduction plan     Active cardiac conditions- none    Revised cardiac risk index predictors- none    Functional capacity -Examples of physical activity  house work, can take 1 flight of stairs and cutting the grass with a mower----- He can undertake all the above activities without  chest pain,chest tightness, Shortness of breath ,dizziness,lightheadedness making his exercise tolerance more, than 4 Mets.       Review of Systems   Constitutional: Negative for chills and fever.        No unusual weight changes  Trying to keep weight down    HENT:        STOPBANG score 5 / 8    Per wife -reported  Snoring   HTN  Age over 50 years  Neck size over 40 CM  Male gender   Eyes:        No unusual vision changes- none    Respiratory:        No cough , phlegm    No Hemoptysis   Cardiovascular:        As noted   Gastrointestinal:        Bowels- Regular   No overt GI/ blood losses   Endocrine:        Prednisone use > 20 mg daily for 3 weeks- none    Genitourinary: Negative for dysuria.        No urinary hesitancy    Musculoskeletal:          No unusual muscle/ joint pains   Skin: Negative for rash.   Neurological: Negative for syncope.        No unilateral weakness   Hematological:          Aspirin use for preventive reasons    Psychiatric/Behavioral:        No Depression,Anxiety     No vascular stenting     No past medical history pertinent negatives.        No anesthesia, bleeding, cardiac problems , PONV with previous surgeries/procedures.  Medications and Allergies reviewed in epic.  FH- No anesthesia,bleeding / venous thrombosis , early onset heart disease in family    Cancer screening suggested  Wife going to help post op  Physical Exam  Blood pressure 128/68.   Pulse 62   Temp 98.3   Sat 100 %       Physical Exam  Constitutional- Vitals - There is no height or weight on file to calculate BMI.,    Vitals:    07/01/19 1007   BP: 128/68     General appearance-Conscious,Coherent  Eyes- No conjunctival icterus,pupils  round  and reactive to light   ENT-Oral cavity- moist  , Hearing grossly normal   Neck- No thyromegaly ,Trachea -central, No jugular venous distension,   No Carotid Bruit   Cardiovascular -Heart Sounds-sitting , reclining ,  Normal ,  systolic murmur aortic area ,  systolic murmur pulmonary area  and systolic murmur tricuspid area   , No gallop rhythm   Respiratory - Normal Respiratory Effort, Normal breath sounds,  no wheeze  and  no forced expiratory wheeze    Peripheral pitting pedal edema-- none , no calf pain   Gastrointestinal -Soft abdomen, No palpable masses, Non Tender,Liver,Spleen not palpable. No-- free fluid and shifting dullness  Musculoskeletal- No finger Clubbing. Strength grossly normal   Lymphatic-No Palpable cervical, axillary,Inguinal lymphadenopathy   Psychiatric - normal effect,Orientation  Rt Dorsalis pedis pulses-palpable    Lt Dorsalis pedis pulses- palpable   Rt Posterior tibial pulses -palpable   Left posterior tibial pulses -palpable   Miscellaneous - scar / swelling RLQ- known to have it - had drain, hernia surgeries at that place , he had surgical evaluation done - suggested follow up ,  Surgical scarRLQ ,  no renal bruit and osteoarthritic nodes      Investigations  Lab and Imaging have been reviewed in epic.    Review of Medicine tests    April 2019         Telemetry 5 /8/2019     Normal sinus rhythm  Normal ECG  No previous ECGs available    EKG- I had independently reviewed the EKG from--5/6/2019   It was reported to be showing     Normal sinus rhythm  Normal ECG  No previous ECGs available    Review of clinical lab tests:  Lab Results   Component Value Date    CREATININE 1.1 05/06/2019    HGB 12.7 (L) 05/06/2019     05/06/2019           Review of old records- Was done and information gathered regards to events leading to surgery and health conditions of  significance in the perioperative period.    Outpatient Subjective & Objective

## 2019-07-01 NOTE — ASSESSMENT & PLAN NOTE
Under PCP care every 3 months  Home BP readings -None   Recent BP readings in the record-140/70's   Hypertension-  Blood pressure is acceptable . I suggest continuation of -Coreg-- during the entire perioperative period. I suggest holding -Losartan - on the morning of the surgery and can continue that  post operatively under blood pressure, electrolyte and renal function monitoring as long as they are acceptable.I suggest addressing pain control as uncontrolled pain can increased blood pressure

## 2019-07-01 NOTE — PROGRESS NOTES
[facility] - Pre Op Consult  Progress Note    Patient Name: Aguilar Waldrop  MRN: [id number]  Date of Evaluation- 07/02/2019  PCP- Leonel Holland MD    Future cases for Aguilar Waldrop [[id number]]     Case ID Status Date Time Nilesh Procedure Provider Location    [id number] [facility] 7/12/2019  7:00  LIGATION-INTERSPHINCTERIC FISTULA TRACT Beau Medrano MD [[id number]] [facility] OR 2ND FLR          HPI:  History of present illness- I had the pleasure of meeting this pleasant 73 y.o. gentleman in the pre op clinic prior to his elective Anal fistula  surgery. The patient is new to me .  Goes by Aguilar/ Thony    I have obtained the history by speaking to the patient and by reviewing the electronic health records.    Events leading up to surgery / History of presenting illness -    Anal fistula  Has problem with anal fistula for 2 years  Has seton that is helping with drainage and having less pain , although has some discomfort while he sits on the seton    He has been troubled with mild discomfort since May 2019 . Pain  with sitting  and decreases with taking weight off .    Has very mild light brown drainage . No fever    Relevant health conditions of significance for the perioperative period/ History of presenting illness -    Health conditions of significance for the perioperative period - HTN,DM    Lives with wife in [facility]    Stays busy, yard work, Grows Cucumbers, peppers, tomatoes etc    Not known to have CAD , Diabetes Mellitus, Lung disease         Subjective/ Objective:          Chief complaint-Preoperative evaluation, Perioperative Medical management, complication reduction plan     Active cardiac conditions- none    Revised cardiac risk index predictors- none    Functional capacity -Examples of physical activity  house work, can take 1 flight of stairs and cutting the grass with a mower----- He can undertake all the above activities without  chest pain,chest tightness, Shortness of breath  ,dizziness,lightheadedness making his exercise tolerance more, than 4 Mets.       Review of Systems   Constitutional: Negative for chills and fever.        No unusual weight changes  Trying to keep weight down    HENT:        STOPBANG score 5 / 8    Per wife -reported  Snoring   HTN  Age over 50 years  Neck size over 40 CM  Male gender   Eyes:        No unusual vision changes- none    Respiratory:        No cough , phlegm    No Hemoptysis   Cardiovascular:        As noted   Gastrointestinal:        Bowels- Regular   No overt GI/ blood losses   Endocrine:        Prednisone use > 20 mg daily for 3 weeks- none    Genitourinary: Negative for dysuria.        No urinary hesitancy    Musculoskeletal:          No unusual muscle/ joint pains   Skin: Negative for rash.   Neurological: Negative for syncope.        No unilateral weakness   Hematological:          Aspirin use for preventive reasons    Psychiatric/Behavioral:        No Depression,Anxiety     No vascular stenting     No past medical history pertinent negatives.        No anesthesia, bleeding, cardiac problems , PONV with previous surgeries/procedures.  Medications and Allergies reviewed in epic.  FH- No anesthesia,bleeding / venous thrombosis , early onset heart disease in family    Cancer screening suggested  Wife going to help post op  Physical Exam  Blood pressure 128/68.   Pulse 62   Temp 98.3   Sat 100 %       Physical Exam  Constitutional- Vitals - There is no height or weight on file to calculate BMI.,   Vitals:    07/01/19 1007   BP: 128/68     General appearance-Conscious,Coherent  Eyes- No conjunctival icterus,pupils  round  and reactive to light   ENT-Oral cavity- moist  , Hearing grossly normal   Neck- No thyromegaly ,Trachea -central, No jugular venous distension,   No Carotid Bruit   Cardiovascular -Heart Sounds-sitting , reclining ,  Normal ,  systolic murmur aortic area ,  systolic murmur pulmonary area  and systolic murmur tricuspid area   , No  gallop rhythm   Respiratory - Normal Respiratory Effort, Normal breath sounds,  no wheeze  and  no forced expiratory wheeze    Peripheral pitting pedal edema-- none , no calf pain   Gastrointestinal -Soft abdomen, No palpable masses, Non Tender,Liver,Spleen not palpable. No-- free fluid and shifting dullness  Musculoskeletal- No finger Clubbing. Strength grossly normal   Lymphatic-No Palpable cervical, axillary,Inguinal lymphadenopathy   Psychiatric - normal effect,Orientation  Rt Dorsalis pedis pulses-palpable    Lt Dorsalis pedis pulses- palpable   Rt Posterior tibial pulses -palpable   Left posterior tibial pulses -palpable   Miscellaneous - scar / swelling RLQ- known to have it - had drain, hernia surgeries at that place , he had surgical evaluation done - suggested follow up ,  Surgical scarRLQ ,  no renal bruit and osteoarthritic nodes      Investigations  Lab and Imaging have been reviewed in epic.    Review of Medicine tests    April 2019         Telemetry 5 /8/2019     Normal sinus rhythm  Normal ECG  No previous ECGs available    EKG- I had independently reviewed the EKG from--5/6/2019   It was reported to be showing     Normal sinus rhythm  Normal ECG  No previous ECGs available    Review of clinical lab tests:  Lab Results   Component Value Date    CREATININE 1.1 05/06/2019    HGB 12.7 (L) 05/06/2019     05/06/2019           Review of old records- Was done and information gathered regards to events leading to surgery and health conditions of significance in the perioperative period.        Preoperative cardiac risk assessment-  The patient does not have any active cardiac conditions . Revised cardiac risk index predictors- -0--.Functional capacity is more than 4 Mets. He will be undergoing a anal  procedure that carries a intermediate risk     Risk of a major Cardiac event ( Defined as death, myocardial infarction, or cardiac arrest at 30 days after noncardiac surgery), based on RCRI score      3.9%       No further cardiac work up is indicated prior to proceeding with the surgery          American Society of Anesthesiologists Physical status classification ( ASA ) class: 2     Postoperative pulmonary complication risk assessment:      ARISCAT ( Canet) risk index- risk class -  Low, if duration of surgery is under 3 hours, intermediate, if duration of surgery is over 3 hours          Assessment/Plan:     Anal fistula  For surgery    Stress incontinence, male  Has prostate removal for prostate cancer around 2010   Follows Urologist annually   Wears a liner       HLD (hyperlipidemia)  HLD-I  suggest continuation of statin during the entire perioperative period.    Long term (current) use of antithrombotics/antiplatelets  ASA for preventive reasons   Holds for procedures with no problem  Holding 1 week pre op    Essential hypertension  Under PCP care every 3 months  Home BP readings -None   Recent BP readings in the record-140/70's   Hypertension-  Blood pressure is acceptable . I suggest continuation of -Coreg-- during the entire perioperative period. I suggest holding -Losartan - on the morning of the surgery and can continue that  post operatively under blood pressure, electrolyte and renal function monitoring as long as they are acceptable.I suggest addressing pain control as uncontrolled pain can increased blood pressure     Type 2 diabetes mellitus without complication, without long-term current use of insulin  Gets eye checks  On Metformin   Type 2 Diabetes Mellitus  On treatment with oral agent Metformin ,no  Insulin    Hemoglobin A1c- ?   Capillary glucose check-None   At PCP capillary glucose 102 ( not fasting )     To his understanding DM is getting better   Had dose reduction of Glimepiride     Diabetes Mellitus-I suggest monitoring the glucose in the perioperative period ( Before meals and bed time,if the patient is on oral feeds or every 6 hourly ,if the patient is NPO )  Blood glucose  target in hospitalized patients is 140-180. Oral Hypoglycemic agents are generally avoided during the hospital stay . If glucose is consistently elevated ,I suggest using basal ,prandial Insulin regimen to control the glucose , as elevated glucose can be associated with adverse surgical out comes. Please consider involving Hospital Medicine or Endocrinology ,if any help is needed with Glucose control. Patient will be instructed based on the pre op clinic guidelines  about adjustment of diabetic treatment (If applicable )  considering the NPO status for Surgery         Cardiac murmur  Under Cardiology care   To his understanding no concern   No Rheumatic fever  -  Cardiology Records indicate Sclerotic Aortic valve     Snoring    Possible sleep apnea- I suggest a sleep study and suggest caution with usage of medication that can cause respiratory suppression in the perioperative period  potential ramifications of untreated sleep apnea, which could include daytime sleepiness, hypertension, heart disease and stroke were discussed    Avoidance of  supine sleep, weight gain , alcoholic beverages , care with , sedative , CNS depressant use indicated  since all of these can worsen ANGELES         Anemia  No visible blood losses   Likely from current illness - fistula   Suggested follow up         Preventive perioperative care    Thromboembolic prophylaxis:  His risk factors for thrombosis include surgical procedure and age.I suggest  thromboembolic prophylaxis ( mechanical/pharmacological, weighing the risk benefits of pharmacological agent use considering wei procedural bleeding )  during the perioperative period.I suggested being active in the post operative period.      Postoperative pulmonary complication prophylaxis-Risk factors for post operative pulmonary complications include age over 65 years- I suggest incentive spirometry use, early ambulation and end tidal carbon dioxide monitoring  , oral care , head end of bed  elevation      Renal complication prophylaxis-Risk factors for renal complications include diabetes mellitus and hypertension . I suggest keeping him well hydrated   Stays hydrated       Surgical site Infection Prophylaxis-I  suggest appropriate antibiotic for Prophylaxis against Surgical site infections     In view of anal procedure  the patient  is at risk of postoperative urinary retention.  I suggest avoidance / minimizing the of  Benzodiazepines,Anticholinergic medication,antihistamines ( Benadryl) , if possible in the perioperative period. I suggest using the minimum possible use of opioids for the minimum period of time in the perioperative period. Benadryl avoidance suggested      This visit was focused on Preoperative evaluation, Perioperative Medical management, complication reduction plans. I suggest that the patient follows up with primary care or relevant sub specialists for ongoing health care.    I appreciate the opportunity to be involved in this patients care. Please feel free to contact me if there were any questions about this consultation.    Patient is optimized     Patient  was instructed to call and update me about any changes to health,  medication, office visits ,testing out side of the wei operative care center , hospitalizations between now and surgery     Sharon Sneed MD  Perioperative Medicine  [facility]   Pager [phone]  -  7/1- 19 14     A1c from today 6.5   Called and spoke to him about the A1c   -  7/2- 18 39     Cardiology records reviewed   Note from April 2017       Suggest Cardiac monitoring wei op     -  7/11- 18 54     Called to follow up , spoke to him ,to address any concerns with the up coming surgery or any questions on Medication instructions -  Doing well ,No changes to Medication, Health -  Med instructions discussed- HTM ,DM   No problem with hernia

## 2019-07-01 NOTE — PROGRESS NOTES
CRS H&P    Visit Info:     Indication: 74 yo diabetic M with recurrent anal fistula after having undergone multiple surgical procedures here at [facility] by Dr. Vu and at other facilities as well by other surgeons.  He is extremely frustrated with ongoing pain and drainage, says that it significantly impact his quality of life due to pain with prolonged sitting.  On exam he had an external fistula opening left anterolateral anal margin, without significant surrounding erythema/induration/crepitus/fluctuance.  For at the time of EUA he was found to have a transsphincteric fistula, and a draining seton was placed 5/8/19.    Current Status:  He is doing well. Wears a pad with thin brown spotting, only needs 1 pad/day. Pain improved. No fevers or chills.  No problems with bowel movements. 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  Procedure discussed with patient, informed consent obtained  For preoperative clinic visit today    Ronaldo Tuttle   Colorectal Surgery Fellow    I have interviewed and examined the patient, reviewed the notes and assessments, and/or personally supervised the procedure(s) performed by Dr. Tuttle, and I concur with her/his documentation of Aguilar Waldrop.  See below addendum for my evaluation and additional findings.    Scheduled for LIFT 7/12/19 for definitive management of recurrent anal fistula the presently has a seton in place.    I have discussed the procedure at length with Aguilar Waldrop.  We discussed the rationale, risks, benefits, and alternatives in depth.  We discussed the expected outcomes and potential complications including but not limited to Delayed wound healing,  wound dehiscence, bleeding, infection, recurrence, prolonged pain, need for further procedures and altered continence.  He verbalized his understanding of the procedure and wishes to proceed.  Written consent was obtained.    Beau Medrano MD, FACS, FASCRS  Senior Staff Surgeon  Department of Colon & Rectal Surgery

## 2019-07-01 NOTE — H&P (VIEW-ONLY)
CRS H&P    Visit Info:     Indication: 72 yo diabetic M with recurrent anal fistula after having undergone multiple surgical procedures here at [facility] by Dr. Vu and at other facilities as well by other surgeons.  He is extremely frustrated with ongoing pain and drainage, says that it significantly impact his quality of life due to pain with prolonged sitting.  On exam he had an external fistula opening left anterolateral anal margin, without significant surrounding erythema/induration/crepitus/fluctuance.  For at the time of EUA he was found to have a transsphincteric fistula, and a draining seton was placed 5/8/19.    Current Status:  He is doing well. Wears a pad with thin brown spotting, only needs 1 pad/day. Pain improved. No fevers or chills.  No problems with bowel movements. 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  Procedure discussed with patient, informed consent obtained  For preoperative clinic visit today    Ronaldo Tuttle   Colorectal Surgery Fellow    I have interviewed and examined the patient, reviewed the notes and assessments, and/or personally supervised the procedure(s) performed by Dr. Tuttle, and I concur with her/his documentation of Aguilar Waldrop.  See below addendum for my evaluation and additional findings.    Scheduled for LIFT 7/12/19 for definitive management of recurrent anal fistula the presently has a seton in place.    I have discussed the procedure at length with Aguilar Waldrop.  We discussed the rationale, risks, benefits, and alternatives in depth.  We discussed the expected outcomes and potential complications including but not limited to Delayed wound healing,  wound dehiscence, bleeding, infection, recurrence, prolonged pain, need for further procedures and altered continence.  He verbalized his understanding of the procedure and wishes to proceed.  Written consent was obtained.    Beau Medrano MD, FACS, FASCRS  Senior Staff Surgeon  Department of Colon & Rectal Surgery

## 2019-07-01 NOTE — HPI
History of present illness- I had the pleasure of meeting this pleasant 73 y.o. gentleman in the pre op clinic prior to his elective Anal fistula  surgery. The patient is new to me .  Goes by Aguilar/ Thony    I have obtained the history by speaking to the patient and by reviewing the electronic health records.    Events leading up to surgery / History of presenting illness -    Anal fistula  Has problem with anal fistula for 2 years  Has seton that is helping with drainage and having less pain , although has some discomfort while he sits on the seton    He has been troubled with mild discomfort since May 2019 . Pain  with sitting  and decreases with taking weight off .    Has very mild light brown drainage . No fever    Relevant health conditions of significance for the perioperative period/ History of presenting illness -    Health conditions of significance for the perioperative period - HTN,DM    Lives with wife in [facility] [place]    Stays busy, yard work, Grows Cucumbers, peppers, tomatoes etc    Not known to have CAD , Diabetes Mellitus, Lung disease

## 2019-07-01 NOTE — ASSESSMENT & PLAN NOTE
Has prostate removal for prostate cancer around 2010   Follows Urologist annually   Wears a liner

## 2019-07-01 NOTE — ASSESSMENT & PLAN NOTE
Gets eye checks  On Metformin   Type 2 Diabetes Mellitus  On treatment with oral agent Metformin ,no  Insulin    Hemoglobin A1c- ?   Capillary glucose check-None   At PCP capillary glucose 102 ( not fasting )     To his understanding DM is getting better   Had dose reduction of Glimepiride     Diabetes Mellitus-I suggest monitoring the glucose in the perioperative period ( Before meals and bed time,if the patient is on oral feeds or every 6 hourly ,if the patient is NPO )  Blood glucose target in hospitalized patients is 140-180. Oral Hypoglycemic agents are generally avoided during the hospital stay . If glucose is consistently elevated ,I suggest using basal ,prandial Insulin regimen to control the glucose , as elevated glucose can be associated with adverse surgical out comes. Please consider involving Hospital Medicine or Endocrinology ,if any help is needed with Glucose control. Patient will be instructed based on the pre op clinic guidelines  about adjustment of diabetic treatment (If applicable )  considering the NPO status for Surgery

## 2019-07-01 NOTE — ASSESSMENT & PLAN NOTE
Possible sleep apnea- I suggest a sleep study and suggest caution with usage of medication that can cause respiratory suppression in the perioperative period  potential ramifications of untreated sleep apnea, which could include daytime sleepiness, hypertension, heart disease and stroke were discussed    Avoidance of  supine sleep, weight gain , alcoholic beverages , care with , sedative , CNS depressant use indicated  since all of these can worsen ANGELES

## 2019-07-01 NOTE — ASSESSMENT & PLAN NOTE
Under Cardiology care   To his understanding no concern   No Rheumatic fever  -  Cardiology Records indicate Sclerotic Aortic valve

## 2019-07-01 NOTE — DISCHARGE INSTRUCTIONS
Your surgery has been scheduled for:__________________________________________    You should report to:  ____[facility], located on the [place] side of the first floor of the           [facility] ([phone])  ____[facility], located on the [facility] side of the            Second floor of the [facility] ([phone])  ____3rd Floor [facility] located on the [facility] side of the [facility] [phone]  Please Note   - Tell your doctor if you take Aspirin, products containing Aspirin, herbal medications  or blood thinners, such as Coumadin, Ticlid, or Plavix.  (Consult your provider regarding holding or stopping before surgery).  - Arrange for someone to drive you home following surgery.  You will not be allowed to leave the surgical facility alone or drive yourself home following sedation and anesthesia.  Before Surgery  - Stop taking all herbal medications 14days prior to surgery  - No Motrin/Advil (Ibuprofen) 7 days before surgery  - No Aleve (Naproxen) 7 days before surgery  - No Goody's/BC powder 7 days before surgery  - Stop Taking Asprin, products containing Asprin _____days before surgery  - Stop taking blood thinners_______days before surgery  - Refrain from drinking alcoholic beverages for 24hours before and after surgery  - Stop or limit smoking _________days before surgery  - You may take Tylenol for pain  Night before Surgery  - Take a shower or bath (shower is recommended).  Bathe with Hibiclens soap or an antibacterial soap from the neck down.  If not supplied by your surgeon, hibiclens soap will need to be purchased over the counter in pharmacy.  Rinse soap off thoroughly.  - Shampoo your hair with your regular shampoo                           Food and Beverage Instructions  1. Stop ALL solid food, gum, candy (including vitamins) 8 hours before surgery/procedure time.  2. The patient should be  ENCOURAGED to drink carbohydrate-rich clear liquids (sports drinks, clear juices) until 2 hours prior to surgery/procedure time.  3. CLEAR liquids include only water, black coffee NO creamer, clear oral rehydration drinks, clear sports drinks or clear fruit juices (no orange juice, no pulpy juices, no apple cider). Advise patients if they can read newsprint through the liquid, it qualifies as clear liquid.   4. IF IN DOUBT, drink water instead.   5. NOTHING  TO DRINK 2 hours before to arrival for surgery/procedure time. If you are told to take medication on the morning of surgery, it may be taken with a sip of water.     FOLLOW YOUR SURGEON'S INSTRUCTIONS REGARDING FOOD AND BEVERAGES IF DIFFERENT THAN ABOVE INSTRUCTIONS.    The Day of Surgery  - Take another bath or shower with hibiclens or any antibacterial soap, to reduce the chance of infection.  - Take heart and blood pressure medications with a small sip of water, as advised by the perioperative team.  - Do not take fluid pills  - You may brush your teeth and rinse your mouth, but do not swall any additional water.   - Do not apply perfumes, powder, body lotions or deodorant on the day of surgery.  - Nail polish should be removed.  - Do not wear makeup or moisturizer  - Wear comfortable clothes, such as a button front shirt and loose fitting pants.  - Leave all jewelry, including body piercings, and valuables at home.    - Bring any devices you will neeed after surgery such as crutches or canes.  - If you have sleep apnea, please bring your CPAP machine  In the event that your physical condition changes including the onset of a cold or respiratory illness, or if you have to delay or cancel your surgery, please notify your surgeon.    Anesthesia: General Anesthesia     You are watched continuously during your procedure by your anesthesia provider.     Youre due to have surgery. During surgery, youll be given medicine called anesthesia or anesthetic. This will  keep you comfortable and pain-free. Your anesthesia provider will use general anesthesia.  What is general anesthesia?  General anesthesia puts you into a state like deep sleep. It goes into the bloodstream (IV anesthetics), into the lungs (gas anesthetics), or both. You feel nothing during the procedure. You will not remember it. During the procedure, the anesthesia provider monitors you continuously. He or she checks your heart rate and rhythm, blood pressure, breathing, and blood oxygen.  · IV anesthetics. IV anesthetics are given through an IV line in your arm. Theyre often given first. This is so you are asleep before a gas anesthetic is started. Some kinds of IV anesthetics relieve pain. Others relax you. Your doctor will decide which kind is best in your case.  · Gas anesthetics. Gas anesthetics are breathed into the lungs. They are often used to keep you asleep. They can be given through a facemask or a tube placed in your larynx or trachea (breathing tube).  ? If you have a facemask, your anesthesia provider will most likely place it over your nose and mouth while youre still awake. Youll breathe oxygen through the mask as your IV anesthetic is started. Gas anesthetic may be added through the mask.  ? If you have a tube in the larynx or trachea, it will be inserted into your throat after youre asleep.  Anesthesia tools and medicines  You will likely have:  · IV anesthetics. These are put into an IV line into your bloodstream.  · Gas anesthetics. You breathe these anesthetics into your lungs, where they pass into your bloodstream.  · Pulse oximeter. This is a small clip that is attached to the end of your finger. This measures your blood oxygen level.  · Electrocardiography leads (electrodes). These are small sticky pads that are placed on your chest. They record your heart rate and rhythm.  · Blood pressure cuff. This reads your blood pressure.  Risks and possible complications  General anesthesia has  some risks. These include:  · Breathing problems  · Nausea and vomiting  · Sore throat or hoarseness (usually temporary)  · Allergic reaction to the anesthetic  · Irregular heartbeat (rare)  · Cardiac arrest (rare)   Anesthesia safety  · Follow all instructions you are given for how long not to eat or drink before your procedure.  · Be sure your doctor knows what medicines and drugs you take. This includes over-the-counter medicines, herbs, supplements, alcohol or other drugs. You will be asked when those were last taken.  · Have an adult family member or friend drive you home after the procedure.  · For the first 24 hours after your surgery:  ? Do not drive or use heavy equipment.  ? Do not make important decisions or sign legal documents. If important decisions or signing legal documents is necessary during the first 24 hours after surgery, have a trusted family member or spouse act on your behalf.  ? Avoid alcohol.  ? Have a responsible adult stay with you. He or she can watch for problems and help keep you safe.  Date Last Reviewed: 12/1/2016 © 2000-2017 [employer]. [address] [place] [address], [place] [address]. All rights reserved. This information is not intended as a substitute for professional medical care. Always follow your healthcare professional's instructions

## 2019-07-11 ENCOUNTER — TELEPHONE (OUTPATIENT)
Dept: SURGERY | Facility: CLINIC | Age: 74
End: 2019-07-11

## 2019-07-11 NOTE — TELEPHONE ENCOUNTER
Called patient to confirm arrival time for surgery tomorrow. No answer.Left message to arrive for 5:15 am.

## 2019-07-12 ENCOUNTER — HOSPITAL ENCOUNTER (OUTPATIENT)
Facility: HOSPITAL | Age: 74
Discharge: HOME | End: 2019-07-12
Attending: COLON & RECTAL SURGERY | Admitting: COLON & RECTAL SURGERY
Payer: MEDICARE

## 2019-07-12 ENCOUNTER — ANESTHESIA (OUTPATIENT)
Dept: SURGERY | Facility: HOSPITAL | Age: 74
End: 2019-07-12
Payer: MEDICARE

## 2019-07-12 VITALS
WEIGHT: 177 LBS | TEMPERATURE: 97 F | OXYGEN SATURATION: 99 % | SYSTOLIC BLOOD PRESSURE: 180 MMHG | HEART RATE: 55 BPM | HEIGHT: 71 IN | DIASTOLIC BLOOD PRESSURE: 82 MMHG | BODY MASS INDEX: 24.78 KG/M2 | RESPIRATION RATE: 20 BRPM

## 2019-07-12 DIAGNOSIS — K60.3 FISTULA-IN-ANO: Primary | ICD-10-CM

## 2019-07-12 DIAGNOSIS — K60.2 ANAL FISSURE: ICD-10-CM

## 2019-07-12 LAB
POCT GLUCOSE: 150 MG/DL (ref 70–110)
POCT GLUCOSE: 158 MG/DL (ref 70–110)

## 2019-07-12 PROCEDURE — 27000221 HC OXYGEN, UP TO 24 HOURS

## 2019-07-12 PROCEDURE — 37000008 HC ANESTHESIA 1ST 15 MINUTES: Performed by: COLON & RECTAL SURGERY

## 2019-07-12 PROCEDURE — 25000003 PHARM REV CODE 250: Performed by: NURSE ANESTHETIST, CERTIFIED REGISTERED

## 2019-07-12 PROCEDURE — 94761 N-INVAS EAR/PLS OXIMETRY MLT: CPT

## 2019-07-12 PROCEDURE — 36000706: Performed by: COLON & RECTAL SURGERY

## 2019-07-12 PROCEDURE — 63600175 PHARM REV CODE 636 W HCPCS: Performed by: COLON & RECTAL SURGERY

## 2019-07-12 PROCEDURE — S0030 INJECTION, METRONIDAZOLE: HCPCS | Performed by: NURSE ANESTHETIST, CERTIFIED REGISTERED

## 2019-07-12 PROCEDURE — 27201423 OPTIME MED/SURG SUP & DEVICES STERILE SUPPLY: Performed by: COLON & RECTAL SURGERY

## 2019-07-12 PROCEDURE — 71000015 HC POSTOP RECOV 1ST HR: Performed by: COLON & RECTAL SURGERY

## 2019-07-12 PROCEDURE — 82962 GLUCOSE BLOOD TEST: CPT | Performed by: COLON & RECTAL SURGERY

## 2019-07-12 PROCEDURE — 25000003 PHARM REV CODE 250: Performed by: COLON & RECTAL SURGERY

## 2019-07-12 PROCEDURE — 46280 PR REMOVAL ANAL FISTULA,TRANS/SUPRA/EXTRAPHINCT, +/-SETON: ICD-10-PCS | Mod: ,,, | Performed by: COLON & RECTAL SURGERY

## 2019-07-12 PROCEDURE — 37000009 HC ANESTHESIA EA ADD 15 MINS: Performed by: COLON & RECTAL SURGERY

## 2019-07-12 PROCEDURE — 46280 REMOVE ANAL FIST COMPLEX: CPT | Mod: ,,, | Performed by: COLON & RECTAL SURGERY

## 2019-07-12 PROCEDURE — D9220A PRA ANESTHESIA: ICD-10-PCS | Mod: ANES,,, | Performed by: ANESTHESIOLOGY

## 2019-07-12 PROCEDURE — C9290 INJ, BUPIVACAINE LIPOSOME: HCPCS | Performed by: COLON & RECTAL SURGERY

## 2019-07-12 PROCEDURE — 63600175 PHARM REV CODE 636 W HCPCS: Performed by: NURSE ANESTHETIST, CERTIFIED REGISTERED

## 2019-07-12 PROCEDURE — 25000003 PHARM REV CODE 250: Performed by: STUDENT IN AN ORGANIZED HEALTH CARE EDUCATION/TRAINING PROGRAM

## 2019-07-12 PROCEDURE — 36000707: Performed by: COLON & RECTAL SURGERY

## 2019-07-12 PROCEDURE — D9220A PRA ANESTHESIA: Mod: CRNA,,, | Performed by: NURSE ANESTHETIST, CERTIFIED REGISTERED

## 2019-07-12 PROCEDURE — 71000033 HC RECOVERY, INTIAL HOUR: Performed by: COLON & RECTAL SURGERY

## 2019-07-12 PROCEDURE — 25000003 PHARM REV CODE 250: Performed by: NURSE PRACTITIONER

## 2019-07-12 PROCEDURE — D9220A PRA ANESTHESIA: ICD-10-PCS | Mod: CRNA,,, | Performed by: NURSE ANESTHETIST, CERTIFIED REGISTERED

## 2019-07-12 PROCEDURE — D9220A PRA ANESTHESIA: Mod: ANES,,, | Performed by: ANESTHESIOLOGY

## 2019-07-12 RX ORDER — SODIUM CHLORIDE 9 MG/ML
INJECTION, SOLUTION INTRAVENOUS CONTINUOUS
Status: ACTIVE | OUTPATIENT
Start: 2019-07-12

## 2019-07-12 RX ORDER — FENTANYL CITRATE 50 UG/ML
INJECTION, SOLUTION INTRAMUSCULAR; INTRAVENOUS
Status: DISCONTINUED | OUTPATIENT
Start: 2019-07-12 | End: 2019-07-12

## 2019-07-12 RX ORDER — LORAZEPAM 2 MG/ML
0.25 INJECTION INTRAMUSCULAR ONCE AS NEEDED
Status: DISCONTINUED | OUTPATIENT
Start: 2019-07-12 | End: 2019-07-12 | Stop reason: HOSPADM

## 2019-07-12 RX ORDER — LIDOCAINE HCL/PF 100 MG/5ML
SYRINGE (ML) INTRAVENOUS
Status: DISCONTINUED | OUTPATIENT
Start: 2019-07-12 | End: 2019-07-12

## 2019-07-12 RX ORDER — BUPIVACAINE HYDROCHLORIDE 2.5 MG/ML
INJECTION, SOLUTION EPIDURAL; INFILTRATION; INTRACAUDAL
Status: DISCONTINUED | OUTPATIENT
Start: 2019-07-12 | End: 2019-07-12 | Stop reason: HOSPADM

## 2019-07-12 RX ORDER — MIDAZOLAM HYDROCHLORIDE 1 MG/ML
INJECTION, SOLUTION INTRAMUSCULAR; INTRAVENOUS
Status: DISCONTINUED | OUTPATIENT
Start: 2019-07-12 | End: 2019-07-12

## 2019-07-12 RX ORDER — ONDANSETRON 2 MG/ML
INJECTION INTRAMUSCULAR; INTRAVENOUS
Status: DISCONTINUED | OUTPATIENT
Start: 2019-07-12 | End: 2019-07-12

## 2019-07-12 RX ORDER — MUPIROCIN 20 MG/G
OINTMENT TOPICAL
Status: DISPENSED | OUTPATIENT
Start: 2019-07-12

## 2019-07-12 RX ORDER — SUCCINYLCHOLINE CHLORIDE 20 MG/ML
INJECTION INTRAMUSCULAR; INTRAVENOUS
Status: DISCONTINUED | OUTPATIENT
Start: 2019-07-12 | End: 2019-07-12

## 2019-07-12 RX ORDER — OXYCODONE AND ACETAMINOPHEN 5; 325 MG/1; MG/1
1 TABLET ORAL EVERY 4 HOURS PRN
Qty: 20 TABLET | Refills: 0 | Status: SHIPPED | OUTPATIENT
Start: 2019-07-12 | End: 2019-08-12

## 2019-07-12 RX ORDER — LIDOCAINE HYDROCHLORIDE 10 MG/ML
1 INJECTION, SOLUTION EPIDURAL; INFILTRATION; INTRACAUDAL; PERINEURAL ONCE
Status: COMPLETED | OUTPATIENT
Start: 2019-07-12 | End: 2019-07-12

## 2019-07-12 RX ORDER — ROCURONIUM BROMIDE 10 MG/ML
INJECTION, SOLUTION INTRAVENOUS
Status: DISCONTINUED | OUTPATIENT
Start: 2019-07-12 | End: 2019-07-12

## 2019-07-12 RX ORDER — HYDROMORPHONE HYDROCHLORIDE 1 MG/ML
0.2 INJECTION, SOLUTION INTRAMUSCULAR; INTRAVENOUS; SUBCUTANEOUS EVERY 5 MIN PRN
Status: DISCONTINUED | OUTPATIENT
Start: 2019-07-12 | End: 2019-07-12 | Stop reason: HOSPADM

## 2019-07-12 RX ORDER — SODIUM CHLORIDE 9 MG/ML
INJECTION, SOLUTION INTRAVENOUS CONTINUOUS
Status: DISCONTINUED | OUTPATIENT
Start: 2019-07-12 | End: 2019-07-12 | Stop reason: HOSPADM

## 2019-07-12 RX ORDER — PROPOFOL 10 MG/ML
VIAL (ML) INTRAVENOUS
Status: DISCONTINUED | OUTPATIENT
Start: 2019-07-12 | End: 2019-07-12

## 2019-07-12 RX ORDER — METRONIDAZOLE 500 MG/100ML
INJECTION, SOLUTION INTRAVENOUS
Status: DISCONTINUED | OUTPATIENT
Start: 2019-07-12 | End: 2019-07-12

## 2019-07-12 RX ORDER — MEPERIDINE HYDROCHLORIDE 50 MG/ML
12.5 INJECTION INTRAMUSCULAR; INTRAVENOUS; SUBCUTANEOUS ONCE AS NEEDED
Status: DISCONTINUED | OUTPATIENT
Start: 2019-07-12 | End: 2019-07-12 | Stop reason: HOSPADM

## 2019-07-12 RX ORDER — KETAMINE HCL IN 0.9 % NACL 50 MG/5 ML
SYRINGE (ML) INTRAVENOUS
Status: DISCONTINUED | OUTPATIENT
Start: 2019-07-12 | End: 2019-07-12

## 2019-07-12 RX ORDER — OXYCODONE AND ACETAMINOPHEN 5; 325 MG/1; MG/1
1 TABLET ORAL EVERY 4 HOURS PRN
Status: DISCONTINUED | OUTPATIENT
Start: 2019-07-12 | End: 2019-07-12 | Stop reason: HOSPADM

## 2019-07-12 RX ADMIN — Medication 10 MG: at 07:07

## 2019-07-12 RX ADMIN — SUCCINYLCHOLINE CHLORIDE 140 MG: 20 INJECTION, SOLUTION INTRAMUSCULAR; INTRAVENOUS at 07:07

## 2019-07-12 RX ADMIN — SODIUM CHLORIDE: 0.9 INJECTION, SOLUTION INTRAVENOUS at 05:07

## 2019-07-12 RX ADMIN — OXYCODONE HYDROCHLORIDE AND ACETAMINOPHEN 1 TABLET: 5; 325 TABLET ORAL at 08:07

## 2019-07-12 RX ADMIN — ROCURONIUM BROMIDE 5 MG: 10 INJECTION, SOLUTION INTRAVENOUS at 07:07

## 2019-07-12 RX ADMIN — MUPIROCIN: 20 OINTMENT TOPICAL at 05:07

## 2019-07-12 RX ADMIN — FENTANYL CITRATE 25 MCG: 50 INJECTION, SOLUTION INTRAMUSCULAR; INTRAVENOUS at 07:07

## 2019-07-12 RX ADMIN — LIDOCAINE HYDROCHLORIDE 100 MG: 20 INJECTION, SOLUTION INTRAVENOUS at 07:07

## 2019-07-12 RX ADMIN — METRONIDAZOLE 500 MG: 500 SOLUTION INTRAVENOUS at 07:07

## 2019-07-12 RX ADMIN — FENTANYL CITRATE 50 MCG: 50 INJECTION, SOLUTION INTRAMUSCULAR; INTRAVENOUS at 07:07

## 2019-07-12 RX ADMIN — PROPOFOL 150 MG: 10 INJECTION, EMULSION INTRAVENOUS at 07:07

## 2019-07-12 RX ADMIN — ONDANSETRON 4 MG: 2 INJECTION INTRAMUSCULAR; INTRAVENOUS at 07:07

## 2019-07-12 RX ADMIN — CEFTRIAXONE 2 G: 1 INJECTION, SOLUTION INTRAVENOUS at 07:07

## 2019-07-12 RX ADMIN — Medication 20 MG: at 07:07

## 2019-07-12 RX ADMIN — MIDAZOLAM HYDROCHLORIDE 2 MG: 1 INJECTION, SOLUTION INTRAMUSCULAR; INTRAVENOUS at 06:07

## 2019-07-12 RX ADMIN — LIDOCAINE HYDROCHLORIDE 0.5 MG: 10 INJECTION, SOLUTION EPIDURAL; INFILTRATION; INTRACAUDAL; PERINEURAL at 05:07

## 2019-07-12 NOTE — INTERVAL H&P NOTE
The patient has been examined and the H&P has been reviewed:    I concur with the findings and no changes have occurred since H&P was written.    Anesthesia/Surgery risks, benefits and alternative options discussed and understood by patient/family.          Active Hospital Problems    Diagnosis  POA    Anal fissure [K60.2]  Yes      Resolved Hospital Problems   No resolved problems to display.

## 2019-07-12 NOTE — DISCHARGE INSTRUCTIONS
Avoid straining/constipation  Gauze packing will pass with 1st BM  Dry dressing as needed  Some bleeding is expected - call for excessive bleeding  Call for severe pain, fever >101, difficulty urinating, constipation  Follow-up with Dr. Medrano  Home Care Instructions  ANAL/RECTAL SURGERY    ACTIVITY LEVEL:  If you received sedation and/or an anesthetic, you may feel sleepy for several hours. Rest until you feel more awake. Gradually resume your normal activities.    DIET:  At home, continue with liquids. If there is no nausea, you may eat a soft diet and gradually resume a high fiber diet. Encourage fluids.    SPECIAL INSTRUCTIONS:  Eat plenty of fruits and vegetables. Drink 8-10 glasses of water or juice every day. Eat whole grain cereals and breads. Salads with raw vegetables are also a good source of fiber.    DRESSING:  Remove your bandage _________________________________ while soaking. Start taking a sitz bath _________________. You should soak in clear warm water ___________ times a day and after each bowel movement. You may use 4 x 4 gauze to the surgical area for any drainage. Wash the area with mild soap during your regular bath. Use soft, damp tissue or disposable wipes not containing alcohol when wiping after  bowel movements, pat the area and gently dry. Avoid excessive or vigorous wiping. It may help to place soft tissue or a cotton pad between the buttocks to keep the cheeks  and to absorb any moisture.    MEDICIATIONS:  You will receive instructions for your pain and/or antibiotic prescriptions. Pain medication should be taken only if needed, and as directed. Antibiotics should be taken as directed until the entire prescription is completed. If ordered, take stool softeners as directed.    WHEN TO CALL THE DOCTOR:   For any obvious active bleeding   Redness or swelling around the incision   Fever over 101°F (38.4°C)   Drainage (pus) from the wound   Persistent pain not relieved by the  pain medication    FOR EMERGENCIES:  If any unusual problems or difficulties occur, contact  or the resident at [phone] (page ) or at the Clinic office, [phone].        Anesthesia: General Anesthesia     You are watched continuously during your procedure by your anesthesia provider.     Youre due to have surgery. During surgery, youll be given medicine called anesthesia or anesthetic. This will keep you comfortable and pain-free. Your anesthesia provider will use general anesthesia.  What is general anesthesia?  General anesthesia puts you into a state like deep sleep. It goes into the bloodstream (IV anesthetics), into the lungs (gas anesthetics), or both. You feel nothing during the procedure. You will not remember it. During the procedure, the anesthesia provider monitors you continuously. He or she checks your heart rate and rhythm, blood pressure, breathing, and blood oxygen.  · IV anesthetics. IV anesthetics are given through an IV line in your arm. Theyre often given first. This is so you are asleep before a gas anesthetic is started. Some kinds of IV anesthetics relieve pain. Others relax you. Your doctor will decide which kind is best in your case.  · Gas anesthetics. Gas anesthetics are breathed into the lungs. They are often used to keep you asleep. They can be given through a facemask or a tube placed in your larynx or trachea (breathing tube).  ¨ If you have a facemask, your anesthesia provider will most likely place it over your nose and mouth while youre still awake. Youll breathe oxygen through the mask as your IV anesthetic is started. Gas anesthetic may be added through the mask.  ¨ If you have a tube in the larynx or trachea, it will be inserted into your throat after youre asleep.  Anesthesia tools and medicines  You will likely have:  · IV anesthetics. These are put into an IV line into your bloodstream.  · Gas anesthetics. You breathe these anesthetics into your  lungs, where they pass into your bloodstream.  · Pulse oximeter. This is a small clip that is attached to the end of your finger. This measures your blood oxygen level.  · Electrocardiography leads (electrodes). These are small sticky pads that are placed on your chest. They record your heart rate and rhythm.  · Blood pressure cuff. This reads your blood pressure.  Risks and possible complications  General anesthesia has some risks. These include:  · Breathing problems  · Nausea and vomiting  · Sore throat or hoarseness (usually temporary)  · Allergic reaction to the anesthetic  · Irregular heartbeat (rare)  · Cardiac arrest (rare)   Anesthesia safety  · Follow all instructions you are given for how long not to eat or drink before your procedure.  · Be sure your doctor knows what medicines and drugs you take. This includes over-the-counter medicines, herbs, supplements, alcohol or other drugs. You will be asked when those were last taken.  · Have an adult family member or friend drive you home after the procedure.  · For the first 24 hours after your surgery:  ¨ Do not drive or use heavy equipment.  ¨ Do not make important decisions or sign legal documents. If important decisions or signing legal documents is necessary during the first 24 hours after surgery, have a trusted family member or spouse act on your behalf.  ¨ Avoid alcohol.  ¨ Have a responsible adult stay with you. He or she can watch for problems and help keep you safe.  Date Last Reviewed: 12/1/2016  © 6362-5052 [employer]. [address] [place] [address], [place] [address]. All rights reserved. This information is not intended as a substitute for professional medical care. Always follow your healthcare professional's instructions.

## 2019-07-12 NOTE — ANESTHESIA POSTPROCEDURE EVALUATION
Anesthesia Post Evaluation    Patient: Aguilar Waldrop    Procedure(s) Performed: Procedure(s) (LRB):  FISTULOTOMY, RECTUM (N/A)    Final Anesthesia Type: general  Patient location during evaluation: PACU  Patient participation: Yes- Able to Participate  Level of consciousness: awake and alert  Post-procedure vital signs: reviewed and stable  Pain management: adequate  Airway patency: patent  PONV status at discharge: No PONV  Anesthetic complications: no      Cardiovascular status: blood pressure returned to baseline  Respiratory status: spontaneous ventilation and room air  Hydration status: euvolemic  Follow-up not needed.          Vitals Value Taken Time   /82 7/12/2019  9:18 AM   Temp 36.2 °C (97.1 °F) 7/12/2019  9:18 AM   Pulse 53 7/12/2019  9:27 AM   Resp 20 7/12/2019  9:18 AM   SpO2 100 % 7/12/2019  9:27 AM   Vitals shown include unvalidated device data.      Event Time     Out of Recovery 08:36:12          Pain/Quiana Score: Pain Rating Prior to Med Admin: 4 (7/12/2019  8:05 AM)  Quiana Score: 9 (7/12/2019  8:30 AM)

## 2019-07-12 NOTE — PROGRESS NOTES
Explained to pt and family that pt is to leave Exparel bractlet on and in place for 96 hours.  All verbalized understanding.

## 2019-07-12 NOTE — BRIEF OP NOTE
[facility]  Brief Operative Note     SUMMARY     Surgery Date: 7/12/2019     Surgeon(s) and Role:     * Beau Medrano MD - Primary     * Ronaldo Tuttle MD - Resident - Assisting        Pre-op Diagnosis:  Anal fistula [K60.3]    Post-op Diagnosis:  Post-Op Diagnosis Codes:     * Anal fistula [K60.3]    Procedure(s) (LRB):  FISTULOTOMY, RECTUM (N/A)    Anesthesia: General    Description of the findings of the procedure: superficial transsphincteric fistula involving <1/3 of sphincter muscle    Technical procedure performed: fistulotomy with marsupialization     Estimated Blood Loss: * No values recorded between 7/12/2019  7:20 AM and 7/12/2019  7:40 AM *         Specimens:   Specimen (12h ago, onward)    None          Discharge Note    SUMMARY     Admit Date: 7/12/2019    Discharge Date and Time:  07/12/2019 7:41 AM    Hospital Course (synopsis of major diagnoses, care, treatment, and services provided during the course of the hospital stay): Aguilar Waldrop is a 73 y.o. male with fistula in ano who had undergone seton placement. He presented for definitive treatment and underwent the above procedure. He recovered in PACU and was discharged home    Final Diagnosis: Post-Op Diagnosis Codes:     * Anal fistula [K60.3]    Disposition: Home or Self Care    Follow Up/Patient Instructions: f/u with Dr. Medrano in 3 weeks    Medications:  Reconciled Home Medications:      Medication List      ASK your doctor about these medications    ascorbic acid (vitamin C) 1000 MG tablet  Commonly known as:  VITAMIN C  Take 1,000 mg by mouth once daily.     aspirin 81 MG EC tablet  Commonly known as:  ECOTRIN  Take 81 mg by mouth once daily.     atorvastatin 40 MG tablet  Commonly known as:  LIPITOR  Take 40 mg by mouth once daily.     CALCIUM 600 WITH VITAMIN D3 600 mg(1,500mg) -400 unit Cap  Generic drug:  calcium carbonate-vitamin D3     carvedilol 3.125 MG tablet  Commonly known as:  COREG  Take 3.125 mg  by mouth 2 (two) times daily.     glimepiride 1 MG tablet  Commonly known as:  AMARYL  Take 0.5 mg by mouth 2 (two) times daily.     loratadine 10 mg tablet  Commonly known as:  CLARITIN     losartan 50 MG tablet  Commonly known as:  COZAAR  Take 50 mg by mouth once daily.     metFORMIN 1000 MG tablet  Commonly known as:  GLUCOPHAGE  Take 1,000 mg by mouth 2 (two) times daily with meals.     multivitamin per tablet  Commonly known as:  THERAGRAN  Take 1 tablet by mouth once daily.     oregano oil 1,500 mg Cap  Take by mouth.     VITAMIN B-12 100 MCG tablet  Generic drug:  cyanocobalamin  Take 100 mcg by mouth once daily.          No discharge procedures on file.

## 2019-07-12 NOTE — PROGRESS NOTES
Dr. Kam notified /84, MD states coming to bedside. No new orders noted at this time. Will continue to monitor.

## 2019-07-12 NOTE — TRANSFER OF CARE
"Anesthesia Transfer of Care Note    Patient: Aguilar Waldrop    Procedure(s) Performed: Procedure(s) (LRB):  FISTULOTOMY, RECTUM (N/A)    Patient location: PACU    Anesthesia Type: general    Transport from OR: Transported from OR on 6-10 L/min O2 by face mask with adequate spontaneous ventilation    Post pain: adequate analgesia    Post assessment: tolerated procedure well and no apparent anesthetic complications    Post vital signs: stable    Level of consciousness: responds to stimulation and sedated    Nausea/Vomiting: no nausea/vomiting    Complications: none    Transfer of care protocol was followed      Last vitals:   Visit Vitals  /84   Pulse 60   Temp 36.9 °C (98.5 °F) (Oral)   Resp 16   Ht 5' 11" (1.803 m)   Wt 80.3 kg (177 lb)   SpO2 100%   BMI 24.69 kg/m²     "

## 2019-07-14 NOTE — OP NOTE
DATE OF PROCEDURE:  07/12/2019.    PREOPERATIVE DIAGNOSIS:  Anal fistula.    POSTOPERATIVE DIAGNOSIS:  Anal fistula.    PROCEDURE PERFORMED:  Examination under anesthesia with low transsphincteric   fistulotomy.    SURGEON:  Beau Medrano M.D.    ASSISTANT:  Ronaldo Tuttle M.D. (RES).    ANESTHESIA:  General endotracheal.    IV FLUIDS:  900 mL of crystalloid.    ESTIMATED BLOOD LOSS:  Less than 5 mL    DRAINS:  None.    SPECIMENS:  None.    COMPLICATIONS:  None.    OPERATIVE FINDINGS:  Low transsphincteric fistula in the left anterolateral   position.    INDICATIONS:  The patient is a 73-year-old male with a history of recurrent anal   fistula.  He had undergone an examination under anesthesia with placement of a   draining seton several months ago.  He presents today for definitive management.    DESCRIPTION OF PROCEDURE:  The patient was identified, brought to the Operating   Room and placed on the stretcher in a supine position after obtaining informed   consent.  Venous sequential stockings were placed.  After induction of general   endotracheal anesthesia, he was repositioned on the operating table in a prone   position using chest rolls and adequate padding of all pressure points.  Table   was jackknifed and the buttocks taped apart to efface the anal verge.  The   perianal region was prepped and draped in the usual sterile fashion.  On initial   inspection, there was a seton in place in the left anterolateral position.    With a bivalve anal speculum, we inspected the anal canal.  The internal opening   was at the level of the intersphincteric groove.  We placed a fistula probe   through the tract and cut and removed the seton.  Palpating the tissue distal to   the fistula probe, the fistula appeared to be in a very low transsphincteric   position.  Given this, I elected to proceed with a primary fistulotomy.    The skin, subcutaneous tissue and subcutaneous external sphincter muscle as   well as a portion of  the internal anal sphincter muscle were then divided with   electrocautery down to the base of the fistula tract.  The fibrinous and   granular debris within the base of the tract was curetted.  Hemostasis was   achieved with electrocautery.  We then marsupialized the fistula tract with a   running 3-0 chromic sutures securing the skin edges down to the base of the   tract.    No other significant findings were noted in the anal canal.  We performed an   anal block using a combination of Exparel and 0.25% Marcaine for postoperative   analgesia.  Gelfoam gauze was placed within the anal canal and the fistulotomy   wound itself was packed with a small piece of Surgicel.  Sterile dressings were   applied.    The patient tolerated the procedure well with no complications.  He was   extubated in the Operating Room and taken to Recovery in satisfactory condition.    All needle, instrument and sponge counts were correct at the end of the case.    I was present throughout the entire procedure.      HUEY  dd: 07/14/2019 09:05:57 (CDT)  td: 07/14/2019 12:23:59 (CDT)  Doc ID   #[id number]  Job ID #[id number]    CC:

## 2019-07-16 ENCOUNTER — TELEPHONE (OUTPATIENT)
Dept: SURGERY | Facility: CLINIC | Age: 74
End: 2019-07-16

## 2019-07-16 NOTE — TELEPHONE ENCOUNTER
----- Message from Rj Birmingham sent at 7/16/2019 12:36 PM CDT -----  Needs Advice    Reason for call: Pt is calling to schedule post op appt. Pt is also asking to speak w/ Lynda.        Communication Preference: [phone]    Additional Information:

## 2019-07-16 NOTE — TELEPHONE ENCOUNTER
Returned call. No answer. Left message post op appointment scheduled and appointment letter mailed.

## 2019-07-17 ENCOUNTER — TELEPHONE (OUTPATIENT)
Dept: SURGERY | Facility: CLINIC | Age: 74
End: 2019-07-17

## 2019-07-17 NOTE — TELEPHONE ENCOUNTER
----- Message from Moriah Mora sent at 7/17/2019 10:48 AM CDT -----  Contact: pt   Needs Advice    Reason for call: pt call stated he need to speak with someone about his surgery.          Communication Preference: [phone] home or [phone] cell    Additional Information: this is his 2nd request

## 2019-07-17 NOTE — TELEPHONE ENCOUNTER
Spoke with patient.  He wants to make sure that he has stitches and that some oozing is okay.  Instructed him to soak in warm water to help with the pain.  Also, informed him of post op appointment with Dr. Medrano on 8/12.

## 2019-08-12 ENCOUNTER — OFFICE VISIT (OUTPATIENT)
Dept: SURGERY | Facility: CLINIC | Age: 74
End: 2019-08-12
Payer: MEDICARE

## 2019-08-12 VITALS
DIASTOLIC BLOOD PRESSURE: 70 MMHG | HEIGHT: 71 IN | WEIGHT: 170.63 LBS | HEART RATE: 61 BPM | BODY MASS INDEX: 23.89 KG/M2 | SYSTOLIC BLOOD PRESSURE: 163 MMHG

## 2019-08-12 DIAGNOSIS — K60.3 ANAL FISTULA: Primary | ICD-10-CM

## 2019-08-12 PROCEDURE — 99999 PR PBB SHADOW E&M-EST. PATIENT-LVL III: ICD-10-PCS | Mod: PBBFAC,,, | Performed by: COLON & RECTAL SURGERY

## 2019-08-12 PROCEDURE — 99999 PR PBB SHADOW E&M-EST. PATIENT-LVL III: CPT | Mod: PBBFAC,,, | Performed by: COLON & RECTAL SURGERY

## 2019-08-12 PROCEDURE — 99024 PR POST-OP FOLLOW-UP VISIT: ICD-10-PCS | Mod: S$GLB,,, | Performed by: COLON & RECTAL SURGERY

## 2019-08-12 PROCEDURE — 99024 POSTOP FOLLOW-UP VISIT: CPT | Mod: S$GLB,,, | Performed by: COLON & RECTAL SURGERY

## 2019-08-25 NOTE — PROGRESS NOTES
CRS Post-operative visit    Visit Info:     Procedure: Examination under anesthesia with low transsphincteric fistulotomy.    Date of Procedure: July 12, 2019    Indication: 72 yo diabetic M with recurrent anal fistula after having undergone multiple surgical procedures here at [facility] by Dr. Vu and at other facilities as well by other surgeons.  On exam he had an external fistula opening left anterolateral anal margin, without significant surrounding erythema/induration/crepitus/fluctuance. He was taken to OR for EUA 5/8/19 and found to have a transsphincteric fistula, and a draining seton was placed.  He was taken to the OR on 07/12/2019 for definitive management, at that time he was noted to have a very low transsphincteric fistula and a primary fistulotomy was performed.    Current Status: Doing well postoperatively.  Does have some ongoing discomfort at the incision site but this seems to be improving slowly.  He reports no significant bleeding or drainage. No trouble with continence.  No fevers or chills.    Pathology:   N/a    Physical Exam:  General: White male in NAD   Neuro: aaox4   Respiratory: resps even unlabored  Extremities: Warm dry and intact  Anorectal:  JENNIFER fistulotomy wound healing well    Assessment:  Recurrent anal fistula, status post fistulotomy    Plan:  Continue local wound care - keep the wound is clean and dry as possible.  Avoid constipation and straining.  RTO 4 weeks    Beau Medrano MD, FACS, FASCRS  Senior Staff Surgeon  Department of Colon & Rectal Surgery     This note was created using voice recognition software, and may contain some unrecognized transcriptional errors.

## 2019-09-09 ENCOUNTER — OFFICE VISIT (OUTPATIENT)
Dept: SURGERY | Facility: CLINIC | Age: 74
End: 2019-09-09
Payer: MEDICARE

## 2019-09-09 VITALS
HEIGHT: 71 IN | SYSTOLIC BLOOD PRESSURE: 158 MMHG | BODY MASS INDEX: 24.6 KG/M2 | DIASTOLIC BLOOD PRESSURE: 65 MMHG | WEIGHT: 175.69 LBS | HEART RATE: 61 BPM

## 2019-09-09 DIAGNOSIS — K60.3 ANAL FISTULA: Primary | ICD-10-CM

## 2019-09-09 PROCEDURE — 99024 POSTOP FOLLOW-UP VISIT: CPT | Mod: S$GLB,,, | Performed by: COLON & RECTAL SURGERY

## 2019-09-09 PROCEDURE — 99999 PR PBB SHADOW E&M-EST. PATIENT-LVL III: CPT | Mod: PBBFAC,,, | Performed by: COLON & RECTAL SURGERY

## 2019-09-09 PROCEDURE — 99999 PR PBB SHADOW E&M-EST. PATIENT-LVL III: ICD-10-PCS | Mod: PBBFAC,,, | Performed by: COLON & RECTAL SURGERY

## 2019-09-09 PROCEDURE — 99024 PR POST-OP FOLLOW-UP VISIT: ICD-10-PCS | Mod: S$GLB,,, | Performed by: COLON & RECTAL SURGERY

## 2019-09-09 NOTE — LETTER
September 23, 2019      Leonel Holland MD  [address]  [address]  [place] [address]           [facility]  [address]  [facility] [address]  Phone: [phone]           Patient: Aguilar Waldrop   MR Number: [id number]   YOB: 1945   Date of Visit: 9/9/2019       Dear Dr. Coleman:    Thank you for referring Aguilar Waldrop to me for evaluation. Attached you will find relevant portions of my assessment and plan of care.    If you have questions, please do not hesitate to call me. I look forward to following Aguilar Waldrop along with you.    Sincerely,    Beau Medrano MD    Enclosure  CC:  No Recipients    If you would like to receive this communication electronically, please contact [email or web address] or [phone] to request more information on [employer] access.    For providers and/or their staff who would like to refer a patient to [facility], please contact us through our one-stop-shop provider referral line, [facility] [place], at [phone].    If you feel you have received this communication in error or would no longer like to receive these types of communications, please e-mail [email or web address]

## 2019-09-24 NOTE — PROGRESS NOTES
CRS Post-operative visit    Visit Info:     Procedure: Examination under anesthesia with low transsphincteric fistulotomy.    Date of Procedure: July 12, 2019    Indication: 74 yo diabetic M with recurrent anal fistula after having undergone multiple surgical procedures here at [facility] by Dr. Vu and at other facilities as well by other surgeons.  On exam he had an external fistula opening left anterolateral anal margin, without significant surrounding erythema/induration/crepitus/fluctuance. He was taken to OR for EUA 5/8/19 and found to have a transsphincteric fistula, and a draining seton was placed.  He was taken to the OR on 07/12/2019 for definitive management, at that time he was noted to have a very low transsphincteric fistula and a primary fistulotomy was performed.    Current Status:   POV 8/12/19:  Doing well postoperatively.  Does have some ongoing discomfort at the incision site but this seems to be improving slowly.  He reports no significant bleeding or drainage. No trouble with continence.  No fevers or chills.    Today:  He reports still having some stinging with bowel movements and some pain with sitting, but otherwise is doing very well. No problems with continence.  No bleeding or drainage. No fevers or chills.    Pathology:   N/a    Physical Exam:  General: White male in NAD   Neuro: aaox4   Respiratory: resps even unlabored  Extremities: Warm dry and intact  Anorectal:  JENNIFER fistulotomy wound 95% healed    Assessment:  Recurrent anal fistula, status post fistulotomy    Plan:  Continue local wound care - keep the wound is clean and dry as possible.  Avoid constipation and straining.  Patient reassured that his discomfort should continue to improve as the wound completely heals.  RTO prn    Beau Medrano MD, FACS, FASCRS  Senior Staff Surgeon  Department of Colon & Rectal Surgery     This note was created using voice recognition software, and may contain some unrecognized transcriptional  errors.

## 2019-11-11 ENCOUNTER — TELEPHONE (OUTPATIENT)
Dept: UROLOGY | Facility: CLINIC | Age: 74
End: 2019-11-11

## 2019-11-11 NOTE — TELEPHONE ENCOUNTER
----- Message from Andria Robertson sent at 11/11/2019 11:19 AM CST -----  Contact: Self   Type: Patient Call Back    Who called: Self     What is the request in detail:  Asking to speak with the office   Can the clinic reply by [facility]?  Call   Would the patient rather a call back or a response via [facility]? Call   Best call back number: [phone]    Additional Information:

## 2019-11-25 ENCOUNTER — TELEPHONE (OUTPATIENT)
Dept: UROLOGY | Facility: CLINIC | Age: 74
End: 2019-11-25

## 2019-11-25 NOTE — TELEPHONE ENCOUNTER
----- Message from Katy Wild MA sent at 11/22/2019  3:35 PM CST -----  Luiz    Can you see if the patient would like his orders mailed to him. ( I just printed his PSA lab orders) verify his mailing address please    Raven Arriola Staff  Caller: pt (Today,  2:16 PM)         Name of Who is Calling: Aguilar Waldrop       What is the request in detail: pt would like to have orders for PSA sent to him so he can go to lab co and have it done. Please contact to further discuss and advise.         Can the clinic reply by [facility]: N       What Number to Call Back if not in [facility]: [phone]; [phone] (home)

## 2019-12-16 ENCOUNTER — PATIENT MESSAGE (OUTPATIENT)
Dept: UROLOGY | Facility: CLINIC | Age: 74
End: 2019-12-16

## 2019-12-16 DIAGNOSIS — C61 PROSTATE CANCER: Primary | ICD-10-CM

## 2019-12-20 LAB — PSA SERPL-MCNC: <0.1 NG/ML (ref 0–4)

## 2019-12-20 NOTE — PROGRESS NOTES
Dr Giordano forwarded these results to the patient via [employer].  He will discuss the results with the patient in person at the next appointment.  The patient was instructed to make an appointment if he does not already have one scheduled.

## 2019-12-24 ENCOUNTER — OFFICE VISIT (OUTPATIENT)
Dept: UROLOGY | Facility: CLINIC | Age: 74
End: 2019-12-24
Payer: MEDICARE

## 2019-12-24 VITALS
WEIGHT: 175 LBS | HEART RATE: 60 BPM | DIASTOLIC BLOOD PRESSURE: 67 MMHG | BODY MASS INDEX: 24.5 KG/M2 | HEIGHT: 71 IN | SYSTOLIC BLOOD PRESSURE: 139 MMHG

## 2019-12-24 DIAGNOSIS — N39.3 URINARY STRESS INCONTINENCE, MALE: Primary | ICD-10-CM

## 2019-12-24 PROCEDURE — 99214 PR OFFICE/OUTPT VISIT, EST, LEVL IV, 30-39 MIN: ICD-10-PCS | Mod: S$GLB,,, | Performed by: UROLOGY

## 2019-12-24 PROCEDURE — 1101F PT FALLS ASSESS-DOCD LE1/YR: CPT | Mod: CPTII,S$GLB,, | Performed by: UROLOGY

## 2019-12-24 PROCEDURE — 1159F MED LIST DOCD IN RCRD: CPT | Mod: S$GLB,,, | Performed by: UROLOGY

## 2019-12-24 PROCEDURE — 1159F PR MEDICATION LIST DOCUMENTED IN MEDICAL RECORD: ICD-10-PCS | Mod: S$GLB,,, | Performed by: UROLOGY

## 2019-12-24 PROCEDURE — 1126F AMNT PAIN NOTED NONE PRSNT: CPT | Mod: S$GLB,,, | Performed by: UROLOGY

## 2019-12-24 PROCEDURE — 99214 OFFICE O/P EST MOD 30 MIN: CPT | Mod: S$GLB,,, | Performed by: UROLOGY

## 2019-12-24 PROCEDURE — 1126F PR PAIN SEVERITY QUANTIFIED, NO PAIN PRESENT: ICD-10-PCS | Mod: S$GLB,,, | Performed by: UROLOGY

## 2019-12-24 PROCEDURE — 1101F PR PT FALLS ASSESS DOC 0-1 FALLS W/OUT INJ PAST YR: ICD-10-PCS | Mod: CPTII,S$GLB,, | Performed by: UROLOGY

## 2019-12-24 RX ORDER — LOSARTAN POTASSIUM 100 MG/1
TABLET ORAL
COMMUNITY
Start: 2019-10-12 | End: 2020-08-02

## 2019-12-24 NOTE — PROGRESS NOTES
"Subjective:      Aguilar Waldrop is a 74 y.o. male who returns today for yearly follow up for prostate cancer luisana 7 [id number] sp surgery and early salvage XRT    He has had a difficult year to do his anal fistula causing infections and requiring surgeries for repair.   June and December 2018 he underwent 2 right open inguinal hernia repairs. He is finally feeling a bit back to his normal self in the last couple months. Finally starting to gain weight.     Reports that his stress incontinence is worse - changes pad twice per day. Reports leaking even with small movements.   Has tried oxybutynin in the past. He does not recall if it helped but his PCP had him stop the medication likely for cognitive side effects.     The following portions of the patient's history were reviewed and updated as appropriate: allergies, current medications, past family history, past medical history, past social history, past surgical history and problem list.    Review of Systems  Pertinent items are noted in HPI.  A comprehensive multipoint review of systems was negative except as otherwise stated in the HPI.     Objective:   Vitals: /67 (BP Location: Right arm, Patient Position: Sitting, BP Method: Medium (Automatic))   Pulse 60   Ht 5' 11" (1.803 m)   Wt 79.4 kg (175 lb)   BMI 24.41 kg/m²     Physical Exam   General: alert and oriented, no acute distress  Respiratory: Symmetric expansion, non-labored breathing  Cardiovascular: normal to inspection  Abdomen: non distended   Skin: normal coloration and turgor, no rashes, no suspicious skin lesions noted  Neuro: no gross deficits  Psych: normal judgment and insight, normal mood/affect and non-anxious    Physical Exam    Lab Review   Urinalysis demonstrates negative for all components  Lab Results   Component Value Date    WBC 8.59 05/06/2019    HGB 12.7 (L) 05/06/2019    HCT 40.3 05/06/2019    MCV 97 05/06/2019     05/06/2019     Lab Results   Component Value " Date    CREATININE 1.1 05/06/2019    BUN 25 (H) 05/06/2019     No results found for: PSA, PSADIAG, PSATOTAL, PSAFREE, PSAFREEPCT  PSA less than 0.1    Imaging: None    Assessment and Plan:     Prostate cancer MARY KATE luisana 7 [id number] sp surgery and early salvage XRT  -     Prostate Specific Antigen yearly    Urinary stress incontinence, male  -     POCT URINE DIPSTICK WITHOUT MICROSCOPE  -     Kegel exercises  -     Myrbetriq 25mg - 3 month trial     F/u 3 months

## 2020-03-24 ENCOUNTER — OFFICE VISIT (OUTPATIENT)
Dept: UROLOGY | Facility: CLINIC | Age: 75
End: 2020-03-24
Payer: MEDICARE

## 2020-03-24 DIAGNOSIS — K40.91 UNILATERAL RECURRENT INGUINAL HERNIA WITHOUT OBSTRUCTION OR GANGRENE: ICD-10-CM

## 2020-03-24 DIAGNOSIS — C61 PROSTATE CANCER: Primary | ICD-10-CM

## 2020-03-24 DIAGNOSIS — N39.3 URINARY STRESS INCONTINENCE, MALE: ICD-10-CM

## 2020-03-24 PROCEDURE — 1159F MED LIST DOCD IN RCRD: CPT | Mod: 95,,, | Performed by: UROLOGY

## 2020-03-24 PROCEDURE — 99213 OFFICE O/P EST LOW 20 MIN: CPT | Mod: 95,,, | Performed by: UROLOGY

## 2020-03-24 PROCEDURE — 1101F PR PT FALLS ASSESS DOC 0-1 FALLS W/OUT INJ PAST YR: ICD-10-PCS | Mod: CPTII,95,, | Performed by: UROLOGY

## 2020-03-24 PROCEDURE — 1159F PR MEDICATION LIST DOCUMENTED IN MEDICAL RECORD: ICD-10-PCS | Mod: 95,,, | Performed by: UROLOGY

## 2020-03-24 PROCEDURE — 99213 PR OFFICE/OUTPT VISIT, EST, LEVL III, 20-29 MIN: ICD-10-PCS | Mod: 95,,, | Performed by: UROLOGY

## 2020-03-24 PROCEDURE — 1101F PT FALLS ASSESS-DOCD LE1/YR: CPT | Mod: CPTII,95,, | Performed by: UROLOGY

## 2020-03-24 NOTE — PROGRESS NOTES
Subjective:      Aguilar Waldrop is a 74 y.o. male who returns today regarding his     The patient location is: home   The chief complaint leading to consultation is: prostate ca, incontinence  Visit type: Virtual visit with synchronous audio and video  Total time spent with patient: 15min  Each patient to whom he or she provides medical services by telemedicine is:  (1) informed of the relationship between the physician and patient and the respective role of any other health care provider with respect to management of the patient; and (2) notified that he or she may decline to receive medical services by telemedicine and may withdraw from such care at any time.    Notes:     myrbetriq ia expensive and little benefit  He prefers to stop this    2 small pads daily  Small volume leakage    Stopped oxybutinin due to cognitive side effects      .    The following portions of the patient's history were reviewed and updated as appropriate: allergies, current medications, past family history, past medical history, past social history, past surgical history and problem list.    Review of Systems  Pertinent items are noted in HPI.  A comprehensive multipoint review of systems was negative except as otherwise stated in the HPI.     Objective:   Vitals: There were no vitals taken for this visit.    Physical Exam   General: alert and oriented, no acute distress  Respiratory: Symmetric expansion, non-labored breathing  Cardiovascular: normal to inspection  Abdomen: non distended   Skin: normal coloration and turgor, no rashes, no suspicious skin lesions noted  Neuro: no gross deficits  Psych: normal judgment and insight, normal mood/affect and non-anxious    Physical Exam    Lab Review   Urinalysis demonstrates no specimen    Lab Results   Component Value Date    WBC 8.0 01/09/2020    HGB 12.1 (L) 01/09/2020    HCT 37.2 (L) 01/09/2020    MCV 94 01/09/2020     01/09/2020     Lab Results   Component Value Date     CREATININE 1.32 (H) 01/09/2020    BUN 20 01/09/2020   No results found for: PSA, PSADIAG, PSATOTAL, PSAFREE, PSAFREEPCT      Imaging  -  Assessment and Plan:   Prostate cancer  MARY KATE luisana 7 [id number] sp surgery and early salvage XRT    rtc 12/2020 with psa    Urinary stress incontinence, male  Cont kegels        Please note this encounter occured during the COVID19 pandemic with limit resouces available.    Unilateral recurrent inguinal hernia without obstruction or gangrene

## 2020-04-09 ENCOUNTER — PATIENT MESSAGE (OUTPATIENT)
Dept: UROLOGY | Facility: CLINIC | Age: 75
End: 2020-04-09

## 2021-06-23 NOTE — PRE-PROCEDURE INSTRUCTIONS
Preop screen completed.  Preop instructions(bathing/fasting/directions/location of surgery/ and preop medication instructions reviewed with patient).  Patient instructed to hold/stop all blood thinning medications, prior to surgery, following the pre-surgery recommended guidelines.  Patient verbalized understanding.   Wife very concerned because  went to detox last night and they now won't let him out until Friday.  They will not give him his xanax.  Said he needs to detox from that too before he enters inpatient treatment for alcohol.  Telling wife that he wants to harm himself so they have to let him be released.  She is wondering if there is anything you could do to try to help him.  He plans to enter [facility] after being discharged from detox.  He thought he would be allowed to follow Dr Caal to be weaned off his xanax.

## 2021-08-12 ENCOUNTER — LAB VISIT (OUTPATIENT)
Dept: LAB | Facility: HOSPITAL | Age: 76
End: 2021-08-12
Attending: UROLOGY
Payer: MEDICARE

## 2021-08-12 DIAGNOSIS — N39.3 URINARY STRESS INCONTINENCE, MALE: ICD-10-CM

## 2021-08-12 DIAGNOSIS — C61 PROSTATE CANCER: ICD-10-CM

## 2021-08-12 DIAGNOSIS — K40.91 UNILATERAL RECURRENT INGUINAL HERNIA WITHOUT OBSTRUCTION OR GANGRENE: ICD-10-CM

## 2021-08-12 LAB — COMPLEXED PSA SERPL-MCNC: 0.03 NG/ML (ref 0–4)

## 2021-08-12 PROCEDURE — 36415 COLL VENOUS BLD VENIPUNCTURE: CPT | Mod: PO | Performed by: UROLOGY

## 2021-08-12 PROCEDURE — 84153 ASSAY OF PSA TOTAL: CPT | Performed by: UROLOGY

## 2021-08-13 ENCOUNTER — TELEPHONE (OUTPATIENT)
Dept: UROLOGY | Facility: CLINIC | Age: 76
End: 2021-08-13

## 2021-09-21 ENCOUNTER — OFFICE VISIT (OUTPATIENT)
Dept: UROLOGY | Facility: CLINIC | Age: 76
End: 2021-09-21
Payer: MEDICARE

## 2021-09-21 ENCOUNTER — LAB VISIT (OUTPATIENT)
Dept: LAB | Facility: OTHER | Age: 76
End: 2021-09-21
Payer: MEDICARE

## 2021-09-21 VITALS
WEIGHT: 181 LBS | DIASTOLIC BLOOD PRESSURE: 72 MMHG | HEART RATE: 55 BPM | HEIGHT: 71 IN | SYSTOLIC BLOOD PRESSURE: 149 MMHG | BODY MASS INDEX: 25.34 KG/M2

## 2021-09-21 DIAGNOSIS — R31.0 GROSS HEMATURIA: ICD-10-CM

## 2021-09-21 DIAGNOSIS — N39.3 STRESS INCONTINENCE: ICD-10-CM

## 2021-09-21 DIAGNOSIS — R31.0 GROSS HEMATURIA: Primary | ICD-10-CM

## 2021-09-21 DIAGNOSIS — C61 PROSTATE CANCER: ICD-10-CM

## 2021-09-21 LAB
ANION GAP SERPL CALC-SCNC: 10 MMOL/L (ref 8–16)
BUN SERPL-MCNC: 29 MG/DL (ref 8–23)
CALCIUM SERPL-MCNC: 9.8 MG/DL (ref 8.7–10.5)
CHLORIDE SERPL-SCNC: 104 MMOL/L (ref 95–110)
CO2 SERPL-SCNC: 24 MMOL/L (ref 23–29)
CREAT SERPL-MCNC: 1.6 MG/DL (ref 0.5–1.4)
EST. GFR  (AFRICAN AMERICAN): 49 ML/MIN/1.73 M^2
EST. GFR  (NON AFRICAN AMERICAN): 42 ML/MIN/1.73 M^2
GLUCOSE SERPL-MCNC: 234 MG/DL (ref 70–110)
POTASSIUM SERPL-SCNC: 4.7 MMOL/L (ref 3.5–5.1)
SODIUM SERPL-SCNC: 138 MMOL/L (ref 136–145)

## 2021-09-21 PROCEDURE — 3288F FALL RISK ASSESSMENT DOCD: CPT | Mod: CPTII,S$GLB,, | Performed by: NURSE PRACTITIONER

## 2021-09-21 PROCEDURE — 87086 URINE CULTURE/COLONY COUNT: CPT | Performed by: NURSE PRACTITIONER

## 2021-09-21 PROCEDURE — 1159F MED LIST DOCD IN RCRD: CPT | Mod: CPTII,S$GLB,, | Performed by: NURSE PRACTITIONER

## 2021-09-21 PROCEDURE — 4010F ACE/ARB THERAPY RXD/TAKEN: CPT | Mod: CPTII,S$GLB,, | Performed by: NURSE PRACTITIONER

## 2021-09-21 PROCEDURE — 1160F PR REVIEW ALL MEDS BY PRESCRIBER/CLIN PHARMACIST DOCUMENTED: ICD-10-PCS | Mod: CPTII,S$GLB,, | Performed by: NURSE PRACTITIONER

## 2021-09-21 PROCEDURE — 3078F DIAST BP <80 MM HG: CPT | Mod: CPTII,S$GLB,, | Performed by: NURSE PRACTITIONER

## 2021-09-21 PROCEDURE — 1159F PR MEDICATION LIST DOCUMENTED IN MEDICAL RECORD: ICD-10-PCS | Mod: CPTII,S$GLB,, | Performed by: NURSE PRACTITIONER

## 2021-09-21 PROCEDURE — 1101F PT FALLS ASSESS-DOCD LE1/YR: CPT | Mod: CPTII,S$GLB,, | Performed by: NURSE PRACTITIONER

## 2021-09-21 PROCEDURE — 1101F PR PT FALLS ASSESS DOC 0-1 FALLS W/OUT INJ PAST YR: ICD-10-PCS | Mod: CPTII,S$GLB,, | Performed by: NURSE PRACTITIONER

## 2021-09-21 PROCEDURE — 1126F PR PAIN SEVERITY QUANTIFIED, NO PAIN PRESENT: ICD-10-PCS | Mod: CPTII,S$GLB,, | Performed by: NURSE PRACTITIONER

## 2021-09-21 PROCEDURE — 3008F PR BODY MASS INDEX (BMI) DOCUMENTED: ICD-10-PCS | Mod: CPTII,S$GLB,, | Performed by: NURSE PRACTITIONER

## 2021-09-21 PROCEDURE — 3078F PR MOST RECENT DIASTOLIC BLOOD PRESSURE < 80 MM HG: ICD-10-PCS | Mod: CPTII,S$GLB,, | Performed by: NURSE PRACTITIONER

## 2021-09-21 PROCEDURE — 80048 BASIC METABOLIC PNL TOTAL CA: CPT | Performed by: NURSE PRACTITIONER

## 2021-09-21 PROCEDURE — 3077F PR MOST RECENT SYSTOLIC BLOOD PRESSURE >= 140 MM HG: ICD-10-PCS | Mod: CPTII,S$GLB,, | Performed by: NURSE PRACTITIONER

## 2021-09-21 PROCEDURE — 3077F SYST BP >= 140 MM HG: CPT | Mod: CPTII,S$GLB,, | Performed by: NURSE PRACTITIONER

## 2021-09-21 PROCEDURE — 99214 OFFICE O/P EST MOD 30 MIN: CPT | Mod: S$GLB,,, | Performed by: NURSE PRACTITIONER

## 2021-09-21 PROCEDURE — 4010F PR ACE/ARB THEARPY RXD/TAKEN: ICD-10-PCS | Mod: CPTII,S$GLB,, | Performed by: NURSE PRACTITIONER

## 2021-09-21 PROCEDURE — 1160F RVW MEDS BY RX/DR IN RCRD: CPT | Mod: CPTII,S$GLB,, | Performed by: NURSE PRACTITIONER

## 2021-09-21 PROCEDURE — 36415 COLL VENOUS BLD VENIPUNCTURE: CPT | Performed by: NURSE PRACTITIONER

## 2021-09-21 PROCEDURE — 3288F PR FALLS RISK ASSESSMENT DOCUMENTED: ICD-10-PCS | Mod: CPTII,S$GLB,, | Performed by: NURSE PRACTITIONER

## 2021-09-21 PROCEDURE — 99214 PR OFFICE/OUTPT VISIT, EST, LEVL IV, 30-39 MIN: ICD-10-PCS | Mod: S$GLB,,, | Performed by: NURSE PRACTITIONER

## 2021-09-21 PROCEDURE — 1126F AMNT PAIN NOTED NONE PRSNT: CPT | Mod: CPTII,S$GLB,, | Performed by: NURSE PRACTITIONER

## 2021-09-21 PROCEDURE — 3008F BODY MASS INDEX DOCD: CPT | Mod: CPTII,S$GLB,, | Performed by: NURSE PRACTITIONER

## 2021-09-22 LAB — BACTERIA UR CULT: NO GROWTH

## 2021-09-30 ENCOUNTER — HOSPITAL ENCOUNTER (OUTPATIENT)
Dept: RADIOLOGY | Facility: HOSPITAL | Age: 76
Discharge: HOME | End: 2021-09-30
Attending: NURSE PRACTITIONER
Payer: MEDICARE

## 2021-09-30 DIAGNOSIS — R31.0 GROSS HEMATURIA: ICD-10-CM

## 2021-09-30 PROCEDURE — 74178 CT ABD&PLV WO CNTR FLWD CNTR: CPT | Mod: 26,,, | Performed by: RADIOLOGY

## 2021-09-30 PROCEDURE — 74178 CT ABD&PLV WO CNTR FLWD CNTR: CPT | Mod: TC

## 2021-09-30 PROCEDURE — 74178 CT UROGRAM ABD PELVIS W WO: ICD-10-PCS | Mod: 26,,, | Performed by: RADIOLOGY

## 2021-09-30 PROCEDURE — 25500020 PHARM REV CODE 255: Performed by: NURSE PRACTITIONER

## 2021-09-30 RX ADMIN — IOHEXOL 125 ML: 350 INJECTION, SOLUTION INTRAVENOUS at 10:09

## 2021-12-10 ENCOUNTER — PROCEDURE VISIT (OUTPATIENT)
Dept: UROLOGY | Facility: CLINIC | Age: 76
End: 2021-12-10
Payer: MEDICARE

## 2021-12-10 VITALS
SYSTOLIC BLOOD PRESSURE: 196 MMHG | WEIGHT: 177.94 LBS | HEIGHT: 71 IN | BODY MASS INDEX: 24.91 KG/M2 | DIASTOLIC BLOOD PRESSURE: 84 MMHG

## 2021-12-10 DIAGNOSIS — R31.0 GROSS HEMATURIA: ICD-10-CM

## 2021-12-10 LAB
BILIRUB SERPL-MCNC: NORMAL MG/DL
BLOOD URINE, POC: NORMAL
CLARITY, POC UA: CLEAR
COLOR, POC UA: NORMAL
GLUCOSE UR QL STRIP: NORMAL
KETONES UR QL STRIP: NORMAL
LEUKOCYTE ESTERASE URINE, POC: NORMAL
NITRITE, POC UA: NORMAL
PH, POC UA: 5
PROTEIN, POC: 100
SPECIFIC GRAVITY, POC UA: 1.02
UROBILINOGEN, POC UA: NORMAL

## 2021-12-10 PROCEDURE — 52000 CYSTOURETHROSCOPY: CPT | Mod: S$GLB,,, | Performed by: UROLOGY

## 2021-12-10 PROCEDURE — 81002 URINALYSIS NONAUTO W/O SCOPE: CPT | Mod: S$GLB,,, | Performed by: UROLOGY

## 2021-12-10 PROCEDURE — 52000 CYSTOSCOPY: ICD-10-PCS | Mod: S$GLB,,, | Performed by: UROLOGY

## 2021-12-10 PROCEDURE — 81002 POCT URINE DIPSTICK WITHOUT MICROSCOPE: ICD-10-PCS | Mod: S$GLB,,, | Performed by: UROLOGY

## 2021-12-10 RX ORDER — LIDOCAINE HYDROCHLORIDE 20 MG/ML
JELLY TOPICAL ONCE
Status: COMPLETED | OUTPATIENT
Start: 2021-12-10 | End: 2021-12-10

## 2021-12-10 RX ORDER — CIPROFLOXACIN 500 MG/1
500 TABLET ORAL
Status: COMPLETED | OUTPATIENT
Start: 2021-12-10 | End: 2021-12-10

## 2021-12-10 RX ADMIN — LIDOCAINE HYDROCHLORIDE 5 ML: 20 JELLY TOPICAL at 02:12

## 2021-12-10 RX ADMIN — CIPROFLOXACIN 500 MG: 500 TABLET ORAL at 02:12

## 2022-01-12 ENCOUNTER — PATIENT MESSAGE (OUTPATIENT)
Dept: UROLOGY | Facility: CLINIC | Age: 77
End: 2022-01-12
Payer: MEDICARE

## 2022-03-10 ENCOUNTER — OFFICE VISIT (OUTPATIENT)
Dept: UROLOGY | Facility: CLINIC | Age: 77
End: 2022-03-10
Payer: MEDICARE

## 2022-03-10 VITALS
SYSTOLIC BLOOD PRESSURE: 162 MMHG | DIASTOLIC BLOOD PRESSURE: 71 MMHG | HEIGHT: 71 IN | BODY MASS INDEX: 25.77 KG/M2 | HEART RATE: 60 BPM | WEIGHT: 184.06 LBS

## 2022-03-10 DIAGNOSIS — C61 PROSTATE CANCER: ICD-10-CM

## 2022-03-10 DIAGNOSIS — R31.0 GROSS HEMATURIA: Primary | ICD-10-CM

## 2022-03-10 LAB
BILIRUB SERPL-MCNC: ABNORMAL MG/DL
BLOOD URINE, POC: ABNORMAL
CLARITY, POC UA: CLEAR
COLOR, POC UA: YELLOW
GLUCOSE UR QL STRIP: ABNORMAL
KETONES UR QL STRIP: ABNORMAL
LEUKOCYTE ESTERASE URINE, POC: ABNORMAL
NITRITE, POC UA: ABNORMAL
PH, POC UA: 6
PROTEIN, POC: ABNORMAL
SPECIFIC GRAVITY, POC UA: 1.01
UROBILINOGEN, POC UA: ABNORMAL

## 2022-03-10 PROCEDURE — 1126F PR PAIN SEVERITY QUANTIFIED, NO PAIN PRESENT: ICD-10-PCS | Mod: CPTII,S$GLB,, | Performed by: NURSE PRACTITIONER

## 2022-03-10 PROCEDURE — 81002 URINALYSIS NONAUTO W/O SCOPE: CPT | Mod: S$GLB,,, | Performed by: NURSE PRACTITIONER

## 2022-03-10 PROCEDURE — 99213 OFFICE O/P EST LOW 20 MIN: CPT | Mod: S$GLB,,, | Performed by: NURSE PRACTITIONER

## 2022-03-10 PROCEDURE — 3066F PR DOCUMENTATION OF TREATMENT FOR NEPHROPATHY: ICD-10-PCS | Mod: CPTII,S$GLB,, | Performed by: NURSE PRACTITIONER

## 2022-03-10 PROCEDURE — 3060F PR POS MICROALBUMINURIA RESULT DOCUMENTED/REVIEW: ICD-10-PCS | Mod: CPTII,S$GLB,, | Performed by: NURSE PRACTITIONER

## 2022-03-10 PROCEDURE — 3077F PR MOST RECENT SYSTOLIC BLOOD PRESSURE >= 140 MM HG: ICD-10-PCS | Mod: CPTII,S$GLB,, | Performed by: NURSE PRACTITIONER

## 2022-03-10 PROCEDURE — 81002 POCT URINE DIPSTICK WITHOUT MICROSCOPE: ICD-10-PCS | Mod: S$GLB,,, | Performed by: NURSE PRACTITIONER

## 2022-03-10 PROCEDURE — 3008F BODY MASS INDEX DOCD: CPT | Mod: CPTII,S$GLB,, | Performed by: NURSE PRACTITIONER

## 2022-03-10 PROCEDURE — 1160F PR REVIEW ALL MEDS BY PRESCRIBER/CLIN PHARMACIST DOCUMENTED: ICD-10-PCS | Mod: CPTII,S$GLB,, | Performed by: NURSE PRACTITIONER

## 2022-03-10 PROCEDURE — 4010F PR ACE/ARB THEARPY RXD/TAKEN: ICD-10-PCS | Mod: CPTII,S$GLB,, | Performed by: NURSE PRACTITIONER

## 2022-03-10 PROCEDURE — 1159F PR MEDICATION LIST DOCUMENTED IN MEDICAL RECORD: ICD-10-PCS | Mod: CPTII,S$GLB,, | Performed by: NURSE PRACTITIONER

## 2022-03-10 PROCEDURE — 4010F ACE/ARB THERAPY RXD/TAKEN: CPT | Mod: CPTII,S$GLB,, | Performed by: NURSE PRACTITIONER

## 2022-03-10 PROCEDURE — 3077F SYST BP >= 140 MM HG: CPT | Mod: CPTII,S$GLB,, | Performed by: NURSE PRACTITIONER

## 2022-03-10 PROCEDURE — 1126F AMNT PAIN NOTED NONE PRSNT: CPT | Mod: CPTII,S$GLB,, | Performed by: NURSE PRACTITIONER

## 2022-03-10 PROCEDURE — 3066F NEPHROPATHY DOC TX: CPT | Mod: CPTII,S$GLB,, | Performed by: NURSE PRACTITIONER

## 2022-03-10 PROCEDURE — 1101F PT FALLS ASSESS-DOCD LE1/YR: CPT | Mod: CPTII,S$GLB,, | Performed by: NURSE PRACTITIONER

## 2022-03-10 PROCEDURE — 99213 PR OFFICE/OUTPT VISIT, EST, LEVL III, 20-29 MIN: ICD-10-PCS | Mod: S$GLB,,, | Performed by: NURSE PRACTITIONER

## 2022-03-10 PROCEDURE — 1159F MED LIST DOCD IN RCRD: CPT | Mod: CPTII,S$GLB,, | Performed by: NURSE PRACTITIONER

## 2022-03-10 PROCEDURE — 3078F PR MOST RECENT DIASTOLIC BLOOD PRESSURE < 80 MM HG: ICD-10-PCS | Mod: CPTII,S$GLB,, | Performed by: NURSE PRACTITIONER

## 2022-03-10 PROCEDURE — 3288F FALL RISK ASSESSMENT DOCD: CPT | Mod: CPTII,S$GLB,, | Performed by: NURSE PRACTITIONER

## 2022-03-10 PROCEDURE — 3060F POS MICROALBUMINURIA REV: CPT | Mod: CPTII,S$GLB,, | Performed by: NURSE PRACTITIONER

## 2022-03-10 PROCEDURE — 1101F PR PT FALLS ASSESS DOC 0-1 FALLS W/OUT INJ PAST YR: ICD-10-PCS | Mod: CPTII,S$GLB,, | Performed by: NURSE PRACTITIONER

## 2022-03-10 PROCEDURE — 3288F PR FALLS RISK ASSESSMENT DOCUMENTED: ICD-10-PCS | Mod: CPTII,S$GLB,, | Performed by: NURSE PRACTITIONER

## 2022-03-10 PROCEDURE — 3078F DIAST BP <80 MM HG: CPT | Mod: CPTII,S$GLB,, | Performed by: NURSE PRACTITIONER

## 2022-03-10 PROCEDURE — 3008F PR BODY MASS INDEX (BMI) DOCUMENTED: ICD-10-PCS | Mod: CPTII,S$GLB,, | Performed by: NURSE PRACTITIONER

## 2022-03-10 PROCEDURE — 1160F RVW MEDS BY RX/DR IN RCRD: CPT | Mod: CPTII,S$GLB,, | Performed by: NURSE PRACTITIONER

## 2022-03-10 NOTE — PROGRESS NOTES
"Subjective:      Aguilar Waldrop is a 76 y.o. male who returns today regarding his urinary symptoms.     The patient is s/p prostatectomy and early salvage XRT many years ago (luisana 7 [id number]). Completed PFPT x 2. Has tried several OAB medication for his stress incontinence with little relief. Currently not taking any OAB medication.      Hematuria workup completed last year after a single episode of gross hematuria.  Cysto demonstrated slight stenosis of the distal urethra. There were "marked varicosities in the deep bulbar urethra consistent with previous radiation therapy."  CT urogram "No obstructive uropathy.  No calcified nephroureterolithiasis.  No evidence for solid renal mass."    Reports slightly worsening of leakage since the cystoscope. Denies further episodes of hematuria. Denies dysuria, flank pain and fever/chills. Would like to avoid additional bladder medications at this time.     The following portions of the patient's history were reviewed and updated as appropriate: allergies, current medications, past family history, past medical history, past social history, past surgical history and problem list.    Review of Systems  Constitutional: no fever or chills  ENT: no nasal congestion or sore throat  Respiratory: no cough or shortness of breath  Cardiovascular: no chest pain or palpitations  Gastrointestinal: no nausea or vomiting, tolerating diet  Genitourinary: as per HPI  Hematologic/Lymphatic: no easy bruising or lymphadenopathy  Musculoskeletal: no arthralgias or myalgias  Neurological: no seizures or tremors  Behavioral/Psych: no auditory or visual hallucinations     Objective:   Vitals:   Vitals:    03/10/22 0912   BP: (!) 162/71   Pulse: 60     Physical Exam   General: alert and oriented, no acute distress  Head: normocephalic, atraumatic  Neck: supple, normal ROM  Respiratory: Symmetric expansion, non-labored breathing  Cardiovascular: regular rate and rhythm  Abdomen: soft, non " tender, non distended  Genitourinary: deferred   Skin: normal coloration and turgor, no rashes, no suspicious skin lesions noted  Neuro: alert and oriented x3, no gross deficits  Psych: normal judgment and insight, normal mood/affect and non-anxious  No CVA tenderness    Lab Review   Urinalysis demonstrates positive for protein (trace)  Lab Results   Component Value Date    WBC 7.9 02/09/2022    WBC 8.3 08/05/2020    HGB 12.1 (L) 02/09/2022    HGB 11.3 (L) 08/05/2020    HCT 36.7 (L) 02/09/2022    HCT 36.0 (L) 08/05/2020    MCV 93 02/09/2022    MCV 96 08/05/2020     02/09/2022     08/05/2020     Lab Results   Component Value Date    CREATININE 1.52 (H) 02/09/2022    BUN 31 (H) 02/09/2022     No results found for: PSA  Imaging   None    Assessment:   Gross hematuria  Prostate cancer    Plan:   Aguilar was seen today for follow-up.    Diagnoses and all orders for this visit:    Gross hematuria  -     POCT URINE DIPSTICK WITHOUT MICROSCOPE    Prostate cancer  -     Prostate Specific Antigen, Diagnostic; Future    Plan:  --No further hematuria  --Continue to avoid straining for BM and heavy lifting  --Follow up with Dr. Giordano in 1 year with PSA

## 2022-05-31 NOTE — LETTER
July 1, 2019      Leonel Holland MD  [address]  [address]  [place] [address]           [facility]  [address]  [place] [address]  Phone: [phone]          Patient: Aguilar Waldrop   MR Number: [id number]   YOB: 1945   Date of Visit: 7/1/2019       Dear Dr. Coleman:    Thank you for referring Aguilar Waldrop to me for evaluation. Attached you will find relevant portions of my assessment and plan of care.    If you have questions, please do not hesitate to call me. I look forward to following Aguilar Waldrop along with you.    Sincerely,    Beau Medrano MD    Enclosure  CC:  No Recipients    If you would like to receive this communication electronically, please contact [email or web address] or [phone] to request more information on [employer] Link access.    For providers and/or their staff who would like to refer a patient to [facility], please contact us through our one-stop-shop provider referral line, [facility], at [phone].    If you feel you have received this communication in error or would no longer like to receive these types of communications, please e-mail [email or web address]         
English

## 2023-03-07 ENCOUNTER — LAB VISIT (OUTPATIENT)
Dept: LAB | Facility: HOSPITAL | Age: 78
End: 2023-03-07
Attending: NURSE PRACTITIONER
Payer: MEDICARE

## 2023-03-07 DIAGNOSIS — C61 PROSTATE CANCER: ICD-10-CM

## 2023-03-07 LAB — COMPLEXED PSA SERPL-MCNC: 0.06 NG/ML (ref 0–4)

## 2023-03-07 PROCEDURE — 36415 COLL VENOUS BLD VENIPUNCTURE: CPT | Mod: PO | Performed by: NURSE PRACTITIONER

## 2023-03-07 PROCEDURE — 84153 ASSAY OF PSA TOTAL: CPT | Performed by: NURSE PRACTITIONER

## 2023-03-08 DIAGNOSIS — C61 PROSTATE CANCER: Primary | ICD-10-CM

## 2023-03-15 NOTE — PROGRESS NOTES
"Subjective:      Aguilar Waldrop is a 77 y.o. male who returns today regarding his     The patient is s/p prostatectomy and early salvage XRT many years ago (luisana 7 [id number]). Completed PFPT x 2. Has tried several OAB medication for his stress incontinence with little relief. Currently not taking any OAB medication.      Hematuria workup completed last year after a single episode of gross hematuria.  Cysto demonstrated slight stenosis of the distal urethra. There were "marked varicosities in the deep bulbar urethra consistent with previous radiation therapy."  CT urogram "No obstructive uropathy.  No calcified nephroureterolithiasis.  No evidence for solid renal mass."   .    The following portions of the patient's history were reviewed and updated as appropriate: allergies, current medications, past family history, past medical history, past social history, past surgical history and problem list.    Review of Systems  Pertinent items are noted in HPI.  A comprehensive multipoint review of systems was negative except as otherwise stated in the HPI.    Past Medical History:   Diagnosis Date    Allergy     Diabetes mellitus     Diabetes mellitus, type 2     Hyperlipidemia     Hypertension     Prostate cancer     Urinary leakage      Past Surgical History:   Procedure Laterality Date    EXAMINATION UNDER ANESTHESIA N/A 5/8/2019    Procedure: EXAM UNDER ANESTHESIA;  Surgeon: Beau Medrano MD;  Location: [facility] OR [address] [place] [address];  Service: Colon and Rectal;  Laterality: N/A;    HERNIA REPAIR      x3    INSERTION OF SETON STITCH N/A 5/8/2019    Procedure: PLACEMENT-SETON DRAIN;  Surgeon: Beau Medrano MD;  Location: [facility] OR [address] [place] [address];  Service: Colon and Rectal;  Laterality: N/A;  consents will be done in [facility]    JOINT REPLACEMENT Left     partial- Knee    PROSTATECTOMY      radiation treatment      RECTAL FISTULOTOMY N/A 7/12/2019    Procedure: FISTULOTOMY, RECTUM;  Surgeon: Beau Medrano MD;  Location: [facility] OR [address] [place] [address];  " Service: Colon and Rectal;  Laterality: N/A;     Diabetes Medications               dapagliflozin (FARXIGA) 5 mg Tab tablet Take 1 tablet (5 mg total) by mouth once daily.    glimepiride (AMARYL) 1 MG tablet TAKE 1 TABLET TWICE DAILY    metFORMIN (GLUCOPHAGE) 1000 MG tablet TAKE 1 TABLET TWICE DAILY WITH MEALS          Review of patient's allergies indicates:  No Known Allergies       Objective:   Vitals: There were no vitals taken for this visit.    Physical Exam   General: alert and oriented, no acute distress  Respiratory: Symmetric expansion, non-labored breathing  Cardiovascular: no peripheral edema  Abdomen: soft, non distended  Skin: normal coloration and turgor, no rashes, no suspicious skin lesions noted  Neuro: no gross deficits  Psych: normal judgment and insight, normal mood/affect, and non-anxious  WILLIAM NPR  Some rectal stenosis    Physical Exam    Lab Review   Urinalysis demonstrates negative for all components  Lab Results   Component Value Date    WBC 7.8 02/24/2023    WBC 8.3 08/05/2020    HGB 11.9 (L) 02/24/2023    HGB 11.3 (L) 08/05/2020    HCT 37.3 (L) 02/24/2023    HCT 36.0 (L) 08/05/2020    MCV 92 02/24/2023    MCV 96 08/05/2020     02/24/2023     08/05/2020     Lab Results   Component Value Date    CREATININE 1.77 (H) 02/24/2023    BUN 35 (H) 02/24/2023     Lab Results   Component Value Date    PSADIAG 0.06 03/07/2023    PSADIAG 0.03 08/12/2021         Imaging  -   no hydro    Assessment and Plan:   Prostate cancer  luisana 7 [id number] sp surgery and early salvage XRT    RTC 6 months with psa to see Dr Pasquale Munson imaging if psa rising    Stress incontinence  PFPT  Kegels  Discussed injectables, sling and AUS; he is not interested in additional procedures     CKD  Not obstructive  Follow up with PCP

## 2023-03-17 ENCOUNTER — OFFICE VISIT (OUTPATIENT)
Dept: UROLOGY | Facility: CLINIC | Age: 78
End: 2023-03-17
Payer: MEDICARE

## 2023-03-17 VITALS
BODY MASS INDEX: 25.62 KG/M2 | WEIGHT: 183 LBS | OXYGEN SATURATION: 98 % | SYSTOLIC BLOOD PRESSURE: 193 MMHG | RESPIRATION RATE: 16 BRPM | DIASTOLIC BLOOD PRESSURE: 75 MMHG | HEIGHT: 71 IN | HEART RATE: 61 BPM

## 2023-03-17 DIAGNOSIS — C61 PROSTATE CANCER: Primary | ICD-10-CM

## 2023-03-17 DIAGNOSIS — N39.3 STRESS INCONTINENCE: ICD-10-CM

## 2023-03-17 PROCEDURE — 3046F PR MOST RECENT HEMOGLOBIN A1C LEVEL > 9.0%: ICD-10-PCS | Mod: CPTII,S$GLB,, | Performed by: UROLOGY

## 2023-03-17 PROCEDURE — 3046F HEMOGLOBIN A1C LEVEL >9.0%: CPT | Mod: CPTII,S$GLB,, | Performed by: UROLOGY

## 2023-03-17 PROCEDURE — 1159F PR MEDICATION LIST DOCUMENTED IN MEDICAL RECORD: ICD-10-PCS | Mod: CPTII,S$GLB,, | Performed by: UROLOGY

## 2023-03-17 PROCEDURE — 3077F PR MOST RECENT SYSTOLIC BLOOD PRESSURE >= 140 MM HG: ICD-10-PCS | Mod: CPTII,S$GLB,, | Performed by: UROLOGY

## 2023-03-17 PROCEDURE — 99214 PR OFFICE/OUTPT VISIT, EST, LEVL IV, 30-39 MIN: ICD-10-PCS | Mod: S$GLB,,, | Performed by: UROLOGY

## 2023-03-17 PROCEDURE — 3288F PR FALLS RISK ASSESSMENT DOCUMENTED: ICD-10-PCS | Mod: CPTII,S$GLB,, | Performed by: UROLOGY

## 2023-03-17 PROCEDURE — 1101F PR PT FALLS ASSESS DOC 0-1 FALLS W/OUT INJ PAST YR: ICD-10-PCS | Mod: CPTII,S$GLB,, | Performed by: UROLOGY

## 2023-03-17 PROCEDURE — 99214 OFFICE O/P EST MOD 30 MIN: CPT | Mod: S$GLB,,, | Performed by: UROLOGY

## 2023-03-17 PROCEDURE — 1126F PR PAIN SEVERITY QUANTIFIED, NO PAIN PRESENT: ICD-10-PCS | Mod: CPTII,S$GLB,, | Performed by: UROLOGY

## 2023-03-17 PROCEDURE — 1159F MED LIST DOCD IN RCRD: CPT | Mod: CPTII,S$GLB,, | Performed by: UROLOGY

## 2023-03-17 PROCEDURE — 3078F PR MOST RECENT DIASTOLIC BLOOD PRESSURE < 80 MM HG: ICD-10-PCS | Mod: CPTII,S$GLB,, | Performed by: UROLOGY

## 2023-03-17 PROCEDURE — 1101F PT FALLS ASSESS-DOCD LE1/YR: CPT | Mod: CPTII,S$GLB,, | Performed by: UROLOGY

## 2023-03-17 PROCEDURE — 3078F DIAST BP <80 MM HG: CPT | Mod: CPTII,S$GLB,, | Performed by: UROLOGY

## 2023-03-17 PROCEDURE — 1126F AMNT PAIN NOTED NONE PRSNT: CPT | Mod: CPTII,S$GLB,, | Performed by: UROLOGY

## 2023-03-17 PROCEDURE — 3288F FALL RISK ASSESSMENT DOCD: CPT | Mod: CPTII,S$GLB,, | Performed by: UROLOGY

## 2023-03-17 PROCEDURE — 3077F SYST BP >= 140 MM HG: CPT | Mod: CPTII,S$GLB,, | Performed by: UROLOGY

## 2023-03-17 NOTE — LETTER
March 17, 2023        Doug Vargas III, MD  [address]  [facility] [address]             [facility]  [address] [place] [address], [address]  [facility] [address]  Phone: [phone]  Fax: [phone]   Patient: Aguilar Waldrop   MR Number: [id number]   YOB: 1945   Date of Visit: 3/17/2023       Dear Dr. Vargas:    Thank you for referring Aguilar Waldrop to me for evaluation. Attached you will find relevant portions of my assessment and plan of care.    If you have questions, please do not hesitate to call me. I look forward to following Aguilar Waldrop along with you.    Sincerely,      Flako Giordano MD            CC    No Recipients    Enclosure

## 2023-09-11 ENCOUNTER — LAB VISIT (OUTPATIENT)
Dept: LAB | Facility: HOSPITAL | Age: 78
End: 2023-09-11
Payer: MEDICARE

## 2023-09-11 DIAGNOSIS — C61 PROSTATE CANCER: ICD-10-CM

## 2023-09-11 LAB — COMPLEXED PSA SERPL-MCNC: 0.06 NG/ML (ref 0–4)

## 2023-09-11 PROCEDURE — 84153 ASSAY OF PSA TOTAL: CPT | Performed by: NURSE PRACTITIONER

## 2023-09-11 PROCEDURE — 36415 COLL VENOUS BLD VENIPUNCTURE: CPT | Mod: PO | Performed by: NURSE PRACTITIONER

## 2023-11-12 NOTE — PROGRESS NOTES
"Subjective:      Aguilar Waldrop is a 77 y.o. male who returns today regarding his     The patient is s/p prostatectomy and early salvage XRT many years ago (luisana 7 [id number]). Completed PFPT x 2. Has tried several OAB medication for his stress incontinence with little relief. Currently not taking any OAB medication.      Hematuria workup completed last year after a single episode of gross hematuria.  Cysto demonstrated slight stenosis of the distal urethra. There were "marked varicosities in the deep bulbar urethra consistent with previous radiation therapy."  CT urogram "No obstructive uropathy.  No calcified nephroureterolithiasis.  No evidence for solid renal mass."   ..  Urinary stream is good    He wears 2 thick pads daily  This is a slight increase  He noticed increase urinary freq and leakage after starting jardiance  He spoke with his PCP about this and she feels that this is the best med for him at this time        The following portions of the patient's history were reviewed and updated as appropriate: allergies, current medications, past family history, past medical history, past social history, past surgical history and problem list.    Review of Systems  Pertinent items are noted in HPI.  A comprehensive multipoint review of systems was negative except as otherwise stated in the HPI.    Past Medical History:   Diagnosis Date    Allergy     Diabetes mellitus     Diabetes mellitus, type 2     Hyperlipidemia     Hypertension     Prostate cancer     Urinary leakage      Past Surgical History:   Procedure Laterality Date    EXAMINATION UNDER ANESTHESIA N/A 5/8/2019    Procedure: EXAM UNDER ANESTHESIA;  Surgeon: Beau Medrano MD;  Location: [facility] OR [address] [place] [address];  Service: Colon and Rectal;  Laterality: N/A;    HERNIA REPAIR      x3    INSERTION OF SETON STITCH N/A 5/8/2019    Procedure: PLACEMENT-SETON DRAIN;  Surgeon: Beau Medrano MD;  Location: [facility] OR [address] [place] [address];  Service: Colon and Rectal;  Laterality: " N/A;  consents will be done in [facility]    JOINT REPLACEMENT Left     partial- Knee    PROSTATECTOMY      radiation treatment      RECTAL FISTULOTOMY N/A 7/12/2019    Procedure: FISTULOTOMY, RECTUM;  Surgeon: Beau Medrano MD;  Location: [facility] OR [address] [place] [address];  Service: Colon and Rectal;  Laterality: N/A;     Diabetes Medications               empagliflozin (JARDIANCE) 10 mg tablet Take 1 tablet (10 mg total) by mouth once daily.    glimepiride (AMARYL) 1 MG tablet Take 0.5 tablets by mouth once daily.          Review of patient's allergies indicates:  No Known Allergies       Objective:   Vitals: There were no vitals taken for this visit.    Physical Exam   General: alert and oriented, no acute distress  Respiratory: Symmetric expansion, non-labored breathing  Cardiovascular: no peripheral edema  Abdomen: soft, non distended  Skin: normal coloration and turgor, no rashes, no suspicious skin lesions noted  Neuro: no gross deficits  Psych: normal judgment and insight, normal mood/affect, and non-anxious    Physical Exam    Lab Review   Urinalysis demonstrates +protein and +glucose  Otherwise neg      Lab Results   Component Value Date    WBC 6.9 08/02/2023    WBC 8.3 08/05/2020    HGB 12.4 (L) 08/02/2023    HGB 11.3 (L) 08/05/2020    HCT 40.2 08/02/2023    HCT 36.0 (L) 08/05/2020    MCV 90 08/02/2023    MCV 96 08/05/2020     08/02/2023     08/05/2020     Lab Results   Component Value Date    CREATININE 1.90 (H) 11/02/2023    BUN 36 (H) 11/02/2023     Lab Results   Component Value Date    PSADIAG 0.06 09/11/2023    PSADIAG 0.06 03/07/2023    PSADIAG 0.03 08/12/2021         Imaging  -    Assessment and Plan:   Prostate cancer  luisana 7 [id number]  Psa detectable but stable    RTC 12 months with psa to see Dr Giordano    Stress incontinence  [place]  [facility]  Discussed various management options and referral to Dr Eldridge  He prefers conservative management    Glucosuria  This may exacerbating his urinary symptoms  Will  defer management to PCP

## 2023-11-14 ENCOUNTER — OFFICE VISIT (OUTPATIENT)
Dept: UROLOGY | Facility: CLINIC | Age: 78
End: 2023-11-14
Payer: MEDICARE

## 2023-11-14 VITALS
DIASTOLIC BLOOD PRESSURE: 90 MMHG | WEIGHT: 175.81 LBS | BODY MASS INDEX: 24.61 KG/M2 | HEIGHT: 71 IN | HEART RATE: 59 BPM | SYSTOLIC BLOOD PRESSURE: 184 MMHG

## 2023-11-14 DIAGNOSIS — N39.3 STRESS INCONTINENCE: ICD-10-CM

## 2023-11-14 DIAGNOSIS — C61 PROSTATE CANCER: Primary | ICD-10-CM

## 2023-11-14 PROCEDURE — 3052F HG A1C>EQUAL 8.0%<EQUAL 9.0%: CPT | Mod: CPTII,S$GLB,, | Performed by: UROLOGY

## 2023-11-14 PROCEDURE — 1159F MED LIST DOCD IN RCRD: CPT | Mod: CPTII,S$GLB,, | Performed by: UROLOGY

## 2023-11-14 PROCEDURE — 1126F AMNT PAIN NOTED NONE PRSNT: CPT | Mod: CPTII,S$GLB,, | Performed by: UROLOGY

## 2023-11-14 PROCEDURE — 3052F PR MOST RECENT HEMOGLOBIN A1C LEVEL 8.0 - < 9.0%: ICD-10-PCS | Mod: CPTII,S$GLB,, | Performed by: UROLOGY

## 2023-11-14 PROCEDURE — 3080F DIAST BP >= 90 MM HG: CPT | Mod: CPTII,S$GLB,, | Performed by: UROLOGY

## 2023-11-14 PROCEDURE — 3080F PR MOST RECENT DIASTOLIC BLOOD PRESSURE >= 90 MM HG: ICD-10-PCS | Mod: CPTII,S$GLB,, | Performed by: UROLOGY

## 2023-11-14 PROCEDURE — 99214 OFFICE O/P EST MOD 30 MIN: CPT | Mod: S$GLB,,, | Performed by: UROLOGY

## 2023-11-14 PROCEDURE — 3077F PR MOST RECENT SYSTOLIC BLOOD PRESSURE >= 140 MM HG: ICD-10-PCS | Mod: CPTII,S$GLB,, | Performed by: UROLOGY

## 2023-11-14 PROCEDURE — 1159F PR MEDICATION LIST DOCUMENTED IN MEDICAL RECORD: ICD-10-PCS | Mod: CPTII,S$GLB,, | Performed by: UROLOGY

## 2023-11-14 PROCEDURE — 99214 PR OFFICE/OUTPT VISIT, EST, LEVL IV, 30-39 MIN: ICD-10-PCS | Mod: S$GLB,,, | Performed by: UROLOGY

## 2023-11-14 PROCEDURE — 3077F SYST BP >= 140 MM HG: CPT | Mod: CPTII,S$GLB,, | Performed by: UROLOGY

## 2023-11-14 PROCEDURE — 1126F PR PAIN SEVERITY QUANTIFIED, NO PAIN PRESENT: ICD-10-PCS | Mod: CPTII,S$GLB,, | Performed by: UROLOGY

## (undated) DEVICE — SYR ONLY LUER LOCK 20CC

## (undated) DEVICE — DRESSING GAUZE 6PLY 4X4

## (undated) DEVICE — SEE MEDLINE ITEM 157117

## (undated) DEVICE — GOWN SMART IMP BREATHABLE XXLG

## (undated) DEVICE — TRAY MINOR GEN SURG

## (undated) DEVICE — SEE MEDLINE ITEM 146417

## (undated) DEVICE — PANTIES FEMININE NAPKIN LG/XLG

## (undated) DEVICE — GAUZE SPONGE 4X4 12PLY

## (undated) DEVICE — TAPE SURG DURAPORE 2 X10YD

## (undated) DEVICE — GAUZE VASELINE PETRO 3X9

## (undated) DEVICE — ELECTRODE REM PLYHSV RETURN 9

## (undated) DEVICE — PAD ABD 8X10 STERILE

## (undated) DEVICE — SEE MEDLINE ITEM 157148

## (undated) DEVICE — NDL 22GA X1 1/2 REG BEVEL

## (undated) DEVICE — NDL 20GX1-1/2IN IB

## (undated) DEVICE — CATH MALECOT 12FR 6/BX

## (undated) DEVICE — LUBRICANT SURGILUBE 2 OZ

## (undated) DEVICE — SEE MEDLINE ITEM 152622

## (undated) DEVICE — SEE MEDLINE ITEM 154981

## (undated) DEVICE — HEMOSTAT SURGICEL 2X3IN

## (undated) DEVICE — NDL HYPO A BEVEL 22X1 1/2

## (undated) DEVICE — ADHESIVE MASTISOL VIAL 48/BX

## (undated) DEVICE — BRIEF MESH LARGE